# Patient Record
Sex: MALE | Race: WHITE | Employment: OTHER | URBAN - METROPOLITAN AREA
[De-identification: names, ages, dates, MRNs, and addresses within clinical notes are randomized per-mention and may not be internally consistent; named-entity substitution may affect disease eponyms.]

---

## 2017-10-27 ENCOUNTER — HOSPITAL ENCOUNTER (OUTPATIENT)
Dept: ULTRASOUND IMAGING | Age: 77
Discharge: HOME | End: 2017-10-27
Attending: UROLOGY
Payer: MEDICARE

## 2017-10-27 DIAGNOSIS — N28.1 COMPLEX RENAL CYST: ICD-10-CM

## 2017-10-27 PROCEDURE — 76770 US EXAM ABDO BACK WALL COMP: CPT

## 2017-10-27 NOTE — PROGRESS NOTES
Let pt know renal sonogram shows just slight enlargement from the prior study--needs CT--renal mass protocol and f/u in 6 months--needs a stat creatinine day of procedure--and f/u OV with any doctor

## 2018-04-13 PROBLEM — N28.1 RENAL CYST: Status: ACTIVE | Noted: 2018-04-13

## 2018-04-16 ENCOUNTER — HOSPITAL ENCOUNTER (OUTPATIENT)
Dept: CT IMAGING | Age: 78
Discharge: HOME | End: 2018-04-16
Attending: UROLOGY
Payer: MEDICARE

## 2018-04-16 DIAGNOSIS — N28.1 COMPLEX RENAL CYST: ICD-10-CM

## 2018-04-16 LAB — CREAT BLD-MCNC: 1.5 MG/DL (ref 0.8–1.5)

## 2018-04-16 PROCEDURE — 82565 ASSAY OF CREATININE: CPT

## 2018-04-16 PROCEDURE — 74011000258 HC RX REV CODE- 258: Performed by: UROLOGY

## 2018-04-16 PROCEDURE — 74170 CT ABD WO CNTRST FLWD CNTRST: CPT

## 2018-04-16 PROCEDURE — 74011636320 HC RX REV CODE- 636/320: Performed by: UROLOGY

## 2018-04-16 RX ORDER — SODIUM CHLORIDE 0.9 % (FLUSH) 0.9 %
10 SYRINGE (ML) INJECTION
Status: COMPLETED | OUTPATIENT
Start: 2018-04-16 | End: 2018-04-16

## 2018-04-16 RX ADMIN — IOPAMIDOL 100 ML: 755 INJECTION, SOLUTION INTRAVENOUS at 10:14

## 2018-04-16 RX ADMIN — SODIUM CHLORIDE 100 ML: 900 INJECTION, SOLUTION INTRAVENOUS at 10:14

## 2018-04-16 RX ADMIN — Medication 10 ML: at 10:14

## 2020-05-12 PROBLEM — G47.33 OBSTRUCTIVE SLEEP APNEA SYNDROME: Status: ACTIVE | Noted: 2020-05-12

## 2020-05-28 ENCOUNTER — HOSPITAL ENCOUNTER (OUTPATIENT)
Dept: LAB | Age: 80
Discharge: HOME | End: 2020-05-28
Payer: MEDICARE

## 2020-05-28 LAB
BNP SERPL-MCNC: 179 PG/ML
D DIMER PPP FEU-MCNC: 1.03 UG/ML(FEU)
VIT B12 SERPL-MCNC: <60 PG/ML (ref 193–986)

## 2020-05-28 PROCEDURE — 36415 COLL VENOUS BLD VENIPUNCTURE: CPT

## 2020-05-28 PROCEDURE — 82306 VITAMIN D 25 HYDROXY: CPT

## 2020-05-28 PROCEDURE — 82607 VITAMIN B-12: CPT

## 2020-05-28 PROCEDURE — 85379 FIBRIN DEGRADATION QUANT: CPT

## 2020-05-28 PROCEDURE — 83880 ASSAY OF NATRIURETIC PEPTIDE: CPT

## 2020-05-29 ENCOUNTER — HOSPITAL ENCOUNTER (OUTPATIENT)
Dept: CT IMAGING | Age: 80
Discharge: HOME | End: 2020-05-29
Attending: INTERNAL MEDICINE
Payer: MEDICARE

## 2020-05-29 DIAGNOSIS — R06.09 DYSPNEA ON EXERTION: ICD-10-CM

## 2020-05-29 DIAGNOSIS — R79.89 ELEVATED D-DIMER: ICD-10-CM

## 2020-05-29 PROCEDURE — 74011000258 HC RX REV CODE- 258: Performed by: INTERNAL MEDICINE

## 2020-05-29 PROCEDURE — 74011636320 HC RX REV CODE- 636/320: Performed by: INTERNAL MEDICINE

## 2020-05-29 PROCEDURE — 71260 CT THORAX DX C+: CPT

## 2020-05-29 RX ORDER — SODIUM CHLORIDE 0.9 % (FLUSH) 0.9 %
5-10 SYRINGE (ML) INJECTION
Status: COMPLETED | OUTPATIENT
Start: 2020-05-29 | End: 2020-05-29

## 2020-05-29 RX ADMIN — IOPAMIDOL 100 ML: 755 INJECTION, SOLUTION INTRAVENOUS at 18:05

## 2020-05-29 RX ADMIN — SODIUM CHLORIDE 100 ML: 900 INJECTION, SOLUTION INTRAVENOUS at 18:05

## 2020-05-29 RX ADMIN — Medication 10 ML: at 18:05

## 2020-05-30 LAB — 25(OH)D3+25(OH)D2 SERPL-MCNC: 16 NG/ML (ref 30–100)

## 2020-06-22 ENCOUNTER — HOSPITAL ENCOUNTER (OUTPATIENT)
Dept: SLEEP MEDICINE | Age: 80
Discharge: HOME | End: 2020-06-22
Payer: MEDICARE

## 2020-06-22 PROCEDURE — 95811 POLYSOM 6/>YRS CPAP 4/> PARM: CPT

## 2020-07-09 PROBLEM — R94.31 EKG, ABNORMAL: Status: ACTIVE | Noted: 2020-07-09

## 2020-07-09 PROBLEM — R07.2 PRECORDIAL PAIN: Status: ACTIVE | Noted: 2020-07-09

## 2020-07-31 ENCOUNTER — HOSPITAL ENCOUNTER (OUTPATIENT)
Dept: LAB | Age: 80
Discharge: HOME | End: 2020-07-31
Payer: MEDICARE

## 2020-07-31 DIAGNOSIS — R07.2 PRECORDIAL PAIN: ICD-10-CM

## 2020-07-31 PROBLEM — R94.39 ABNORMAL CARDIOVASCULAR STRESS TEST: Status: ACTIVE | Noted: 2020-07-31

## 2020-07-31 LAB
ANION GAP SERPL CALC-SCNC: 8 MMOL/L (ref 7–16)
BASOPHILS # BLD: 0.1 K/UL (ref 0–0.2)
BASOPHILS NFR BLD: 1 % (ref 0–2)
BUN SERPL-MCNC: 29 MG/DL (ref 8–23)
CALCIUM SERPL-MCNC: 9.3 MG/DL (ref 8.3–10.4)
CHLORIDE SERPL-SCNC: 106 MMOL/L (ref 98–107)
CO2 SERPL-SCNC: 25 MMOL/L (ref 21–32)
CREAT SERPL-MCNC: 1.3 MG/DL (ref 0.8–1.5)
DIFFERENTIAL METHOD BLD: NORMAL
EOSINOPHIL # BLD: 0.2 K/UL (ref 0–0.8)
EOSINOPHIL NFR BLD: 2 % (ref 0.5–7.8)
ERYTHROCYTE [DISTWIDTH] IN BLOOD BY AUTOMATED COUNT: 13.1 % (ref 11.9–14.6)
GLUCOSE SERPL-MCNC: 105 MG/DL (ref 65–100)
HCT VFR BLD AUTO: 41.6 % (ref 41.1–50.3)
HGB BLD-MCNC: 13.6 G/DL (ref 13.6–17.2)
IMM GRANULOCYTES # BLD AUTO: 0 K/UL (ref 0–0.5)
IMM GRANULOCYTES NFR BLD AUTO: 0 % (ref 0–5)
INR PPP: 1
LYMPHOCYTES # BLD: 1.6 K/UL (ref 0.5–4.6)
LYMPHOCYTES NFR BLD: 18 % (ref 13–44)
MCH RBC QN AUTO: 30.1 PG (ref 26.1–32.9)
MCHC RBC AUTO-ENTMCNC: 32.7 G/DL (ref 31.4–35)
MCV RBC AUTO: 92 FL (ref 79.6–97.8)
MONOCYTES # BLD: 0.7 K/UL (ref 0.1–1.3)
MONOCYTES NFR BLD: 8 % (ref 4–12)
NEUTS SEG # BLD: 6.4 K/UL (ref 1.7–8.2)
NEUTS SEG NFR BLD: 71 % (ref 43–78)
NRBC # BLD: 0 K/UL (ref 0–0.2)
PLATELET # BLD AUTO: 163 K/UL (ref 150–450)
PMV BLD AUTO: 11.8 FL (ref 9.4–12.3)
POTASSIUM SERPL-SCNC: 4.4 MMOL/L (ref 3.5–5.1)
PROTHROMBIN TIME: 13.6 SEC (ref 12–14.7)
RBC # BLD AUTO: 4.52 M/UL (ref 4.23–5.6)
SODIUM SERPL-SCNC: 139 MMOL/L (ref 136–145)
WBC # BLD AUTO: 8.9 K/UL (ref 4.3–11.1)

## 2020-07-31 PROCEDURE — 80048 BASIC METABOLIC PNL TOTAL CA: CPT

## 2020-07-31 PROCEDURE — 36415 COLL VENOUS BLD VENIPUNCTURE: CPT

## 2020-07-31 PROCEDURE — 85610 PROTHROMBIN TIME: CPT

## 2020-07-31 PROCEDURE — 85025 COMPLETE CBC W/AUTO DIFF WBC: CPT

## 2020-08-03 ENCOUNTER — HOSPITAL ENCOUNTER (OUTPATIENT)
Dept: CARDIAC CATH/INVASIVE PROCEDURES | Age: 80
Discharge: HOME | End: 2020-08-03
Attending: INTERNAL MEDICINE | Admitting: INTERNAL MEDICINE
Payer: MEDICARE

## 2020-08-03 VITALS
DIASTOLIC BLOOD PRESSURE: 68 MMHG | RESPIRATION RATE: 16 BRPM | BODY MASS INDEX: 25.57 KG/M2 | OXYGEN SATURATION: 96 % | HEIGHT: 76 IN | SYSTOLIC BLOOD PRESSURE: 126 MMHG | WEIGHT: 210 LBS | HEART RATE: 69 BPM

## 2020-08-03 LAB
ANION GAP SERPL CALC-SCNC: 7 MMOL/L (ref 7–16)
ATRIAL RATE: 72 BPM
BUN SERPL-MCNC: 28 MG/DL (ref 8–23)
CALCIUM SERPL-MCNC: 8.8 MG/DL (ref 8.3–10.4)
CALCULATED P AXIS, ECG09: 12 DEGREES
CALCULATED R AXIS, ECG10: -45 DEGREES
CALCULATED T AXIS, ECG11: 64 DEGREES
CHLORIDE SERPL-SCNC: 107 MMOL/L (ref 98–107)
CO2 SERPL-SCNC: 27 MMOL/L (ref 21–32)
CREAT SERPL-MCNC: 1.31 MG/DL (ref 0.8–1.5)
DIAGNOSIS, 93000: NORMAL
GLUCOSE SERPL-MCNC: 102 MG/DL (ref 65–100)
P-R INTERVAL, ECG05: 184 MS
POTASSIUM SERPL-SCNC: 4.1 MMOL/L (ref 3.5–5.1)
Q-T INTERVAL, ECG07: 402 MS
QRS DURATION, ECG06: 110 MS
QTC CALCULATION (BEZET), ECG08: 440 MS
SODIUM SERPL-SCNC: 141 MMOL/L (ref 136–145)
VENTRICULAR RATE, ECG03: 72 BPM

## 2020-08-03 PROCEDURE — 77030004559 HC CATH ANGI DX SUPT CARD -B

## 2020-08-03 PROCEDURE — 74011000250 HC RX REV CODE- 250: Performed by: INTERNAL MEDICINE

## 2020-08-03 PROCEDURE — 74011250636 HC RX REV CODE- 250/636: Performed by: INTERNAL MEDICINE

## 2020-08-03 PROCEDURE — 80048 BASIC METABOLIC PNL TOTAL CA: CPT

## 2020-08-03 PROCEDURE — C1769 GUIDE WIRE: HCPCS

## 2020-08-03 PROCEDURE — 77030029997 HC DEV COM RDL R BND TELE -B

## 2020-08-03 PROCEDURE — 77030016699 HC CATH ANGI DX INFN1 CARD -A

## 2020-08-03 PROCEDURE — 99153 MOD SED SAME PHYS/QHP EA: CPT

## 2020-08-03 PROCEDURE — 74011636320 HC RX REV CODE- 636/320: Performed by: INTERNAL MEDICINE

## 2020-08-03 PROCEDURE — 77030012468 HC VLV BLEEDBK CNTRL ABBT -B

## 2020-08-03 PROCEDURE — C1894 INTRO/SHEATH, NON-LASER: HCPCS

## 2020-08-03 PROCEDURE — 93458 L HRT ARTERY/VENTRICLE ANGIO: CPT

## 2020-08-03 PROCEDURE — 93005 ELECTROCARDIOGRAM TRACING: CPT | Performed by: INTERNAL MEDICINE

## 2020-08-03 PROCEDURE — 99152 MOD SED SAME PHYS/QHP 5/>YRS: CPT

## 2020-08-03 RX ORDER — HEPARIN SODIUM 200 [USP'U]/100ML
2 INJECTION, SOLUTION INTRAVENOUS CONTINUOUS
Status: DISCONTINUED | OUTPATIENT
Start: 2020-08-03 | End: 2020-08-03 | Stop reason: HOSPADM

## 2020-08-03 RX ORDER — SODIUM CHLORIDE 9 MG/ML
75 INJECTION, SOLUTION INTRAVENOUS CONTINUOUS
Status: DISCONTINUED | OUTPATIENT
Start: 2020-08-03 | End: 2020-08-03 | Stop reason: HOSPADM

## 2020-08-03 RX ORDER — MIDAZOLAM HYDROCHLORIDE 1 MG/ML
.5-2 INJECTION, SOLUTION INTRAMUSCULAR; INTRAVENOUS
Status: DISCONTINUED | OUTPATIENT
Start: 2020-08-03 | End: 2020-08-03 | Stop reason: HOSPADM

## 2020-08-03 RX ORDER — ACETAMINOPHEN 325 MG/1
650 TABLET ORAL
Status: CANCELLED | OUTPATIENT
Start: 2020-08-03

## 2020-08-03 RX ORDER — HYDROCODONE BITARTRATE AND ACETAMINOPHEN 5; 325 MG/1; MG/1
1 TABLET ORAL
Status: CANCELLED | OUTPATIENT
Start: 2020-08-03

## 2020-08-03 RX ORDER — FENTANYL CITRATE 50 UG/ML
25-50 INJECTION, SOLUTION INTRAMUSCULAR; INTRAVENOUS
Status: DISCONTINUED | OUTPATIENT
Start: 2020-08-03 | End: 2020-08-03 | Stop reason: HOSPADM

## 2020-08-03 RX ORDER — GUAIFENESIN 100 MG/5ML
324 LIQUID (ML) ORAL ONCE
Status: DISCONTINUED | OUTPATIENT
Start: 2020-08-03 | End: 2020-08-03 | Stop reason: HOSPADM

## 2020-08-03 RX ORDER — ONDANSETRON 2 MG/ML
4 INJECTION INTRAMUSCULAR; INTRAVENOUS
Status: CANCELLED | OUTPATIENT
Start: 2020-08-03 | End: 2020-08-04

## 2020-08-03 RX ORDER — NALOXONE HYDROCHLORIDE 0.4 MG/ML
0.4 INJECTION, SOLUTION INTRAMUSCULAR; INTRAVENOUS; SUBCUTANEOUS AS NEEDED
Status: CANCELLED | OUTPATIENT
Start: 2020-08-03

## 2020-08-03 RX ORDER — SODIUM CHLORIDE 9 MG/ML
75 INJECTION, SOLUTION INTRAVENOUS CONTINUOUS
Status: CANCELLED | OUTPATIENT
Start: 2020-08-03

## 2020-08-03 RX ORDER — SODIUM CHLORIDE 0.9 % (FLUSH) 0.9 %
5-40 SYRINGE (ML) INJECTION AS NEEDED
Status: CANCELLED | OUTPATIENT
Start: 2020-08-03

## 2020-08-03 RX ORDER — LIDOCAINE HYDROCHLORIDE 10 MG/ML
1-5 INJECTION, SOLUTION EPIDURAL; INFILTRATION; INTRACAUDAL; PERINEURAL ONCE
Status: COMPLETED | OUTPATIENT
Start: 2020-08-03 | End: 2020-08-03

## 2020-08-03 RX ORDER — MORPHINE SULFATE 2 MG/ML
1 INJECTION, SOLUTION INTRAMUSCULAR; INTRAVENOUS
Status: CANCELLED | OUTPATIENT
Start: 2020-08-03

## 2020-08-03 RX ORDER — SODIUM CHLORIDE 0.9 % (FLUSH) 0.9 %
5 SYRINGE (ML) INJECTION AS NEEDED
Status: DISCONTINUED | OUTPATIENT
Start: 2020-08-03 | End: 2020-08-03 | Stop reason: HOSPADM

## 2020-08-03 RX ORDER — SODIUM CHLORIDE 0.9 % (FLUSH) 0.9 %
5-40 SYRINGE (ML) INJECTION EVERY 8 HOURS
Status: CANCELLED | OUTPATIENT
Start: 2020-08-03

## 2020-08-03 RX ADMIN — SODIUM CHLORIDE 75 ML/HR: 900 INJECTION, SOLUTION INTRAVENOUS at 08:17

## 2020-08-03 RX ADMIN — HEPARIN SODIUM 2 UNITS/HR: 5000 INJECTION, SOLUTION INTRAVENOUS; SUBCUTANEOUS at 09:48

## 2020-08-03 RX ADMIN — VERAPAMIL HYDROCHLORIDE 2 ML: 2.5 INJECTION, SOLUTION INTRAVENOUS at 09:55

## 2020-08-03 RX ADMIN — FENTANYL CITRATE 25 MCG: 50 INJECTION INTRAMUSCULAR; INTRAVENOUS at 09:48

## 2020-08-03 RX ADMIN — IOPAMIDOL 70 ML: 755 INJECTION, SOLUTION INTRAVENOUS at 10:24

## 2020-08-03 RX ADMIN — MIDAZOLAM 2 MG: 1 INJECTION INTRAMUSCULAR; INTRAVENOUS at 09:49

## 2020-08-03 RX ADMIN — LIDOCAINE HYDROCHLORIDE 3 ML: 10 INJECTION, SOLUTION EPIDURAL; INFILTRATION; INTRACAUDAL; PERINEURAL at 09:57

## 2020-08-03 NOTE — PROCEDURES
Brief Cardiac Procedure Note    Patient: Michael Huggins MRN: [id number]  SSN: [id number]    YOB: 1940  Age: [de-identified] y.o. Sex: male      Date of Procedure: 8/3/2020     Pre-procedure Diagnosis: Typical Angina    Post-procedure Diagnosis: Coronary Artery Disease    Reason for Procedure: New Onset Angina < or = 2 Months    Procedure: Left Heart Catheterization    Brief Description of Procedure:  LHC via R radial artery, selective coronary angiography. Performed By: Lani Childers MD     Assistants: None    Anesthesia: Moderate Sedation    Estimated Blood Loss: Less than 10 mL      Specimens: None    Implants: None    Findings: LM 60% distal, LAD luminal irregs, Circ OM1 40% luminal irregs, RCA prox 30% co-dominant    Complications: None    Recommendations: Discuss CABG vs high risk PCI.     Signed By: Lani Childers MD     August 3, 2020

## 2020-08-03 NOTE — PROCEDURES
[facility]  CARDIAC CATH    Name:  Krishna Vail  MR#:  [id number]  :  1940  ACCOUNT #:  [de-identified]  DATE OF SERVICE:  2020    PROCEDURES PERFORMED:  Left heart catheterization, selective coronary angiography via the right radial artery. PREOPERATIVE DIAGNOSIS:  Angina. POSTOPERATIVE DIAGNOSIS:  Coronary artery disease. SURGEON:  Nitza Beebe MD    ASSISTANT:  None    ESTIMATED BLOOD LOSS:  Less than 10 mL. SPECIMENS REMOVED:  None. COMPLICATIONS:  None. IMPLANTS:  None. ANESTHESIA:  The patient received 2 mg Versed and 25 mcg fentanyl, was monitored for conscious sedation beginning at 9:50 and ending at 10:26 by nurse, Geraldine Zamudio. PROCEDURE:  After informed consent, the patient was prepped and draped in usual sterile fashion. The right wrist was infiltrated with lidocaine. The right radial artery was accessed by the modified Seldinger technique with a 6-Thai sheath. A total of 70 mL of Isovue contrast was used for the entire procedure. A Terumo band was used for hemostasis. CATHETERS USED:  Included a 6-Thai JL3.5, a 6-Thai JR5. FINDINGS:  Left ventricle:  Left ventriculogram was deferred as the patient recently underwent echocardiogram with a normal ejection fraction. LVEDP was 5 mmHg. Left main had distal 60% stenosis, bifurcating into an LAD and into a large circumflex artery. The LAD had very mild luminal irregularities elsewhere. The circumflex artery gave rise to two obtuse marginals, the second one of which had approximately 40% proximal stenosis. Obtuse marginal 1 also had a 50% proximal stenosis. The right coronary artery was a codominant artery and had a proximal 30% stenosis. Mild luminal irregularities throughout otherwise. The left subclavian vein was incredibly tortuous. It was difficult to get our catheters into the ascending aorta. It required significant catheter and wire manipulation. Recommend further heart cath or intervention being done via femoral approach. CONCLUSION:  We will plan on referring for coronary artery bypass grafting. If his family were to elect to pursue percutaneous intervention, I would recommend a femoral approach.       Tania Denton MD      [id number]  D:  08/03/2020 10:37  T:  08/03/2020 14:41  JOB #:  [id number]

## 2020-08-03 NOTE — PROGRESS NOTES
Discharge instructions given to patient and daughter along with CT surgery/pre assessment instructions. Both voice understanding. Patient is alert and oriented. VSS. Patient transferred via wheelchair for discharge.

## 2020-08-03 NOTE — PROGRESS NOTES
TRANSFER - OUT REPORT:    R radial [facility] with Dr Dulce Sullivan  Versed 2 mg  Fentanyl 25 mcg  Surgery consult   TR band 12 ml  No bleeding or hematoma noted at site. Site soft    Verbal report given to Tamanna(name) on Murleen Din  being transferred to CPRU(unit) for routine progression of care       Report consisted of patients Situation, Background, Assessment and   Recommendations(SBAR). Information from the following report(s) Procedure Summary was reviewed with the receiving nurse. Lines:   Peripheral IV 08/02/20 Right Antecubital (Active)       Peripheral IV 08/02/20 Left;Posterior Forearm (Active)        Opportunity for questions and clarification was provided.       Patient transported with:   Registered Nurse

## 2020-08-03 NOTE — PROGRESS NOTES
Patient requesting to get dressed. Bilateral IV removed, tips intact. Continues to refuse any food at this time. Patient awaiting CT surgeon.

## 2020-08-03 NOTE — CONSULTS
Lyn Kim. MD Manju Mullen. MD Leida Casey         8/3/2020        1940    REFERRING PHYSICIAN:  Dr. Tianna Casiano:  The patient is a [de-identified] y.o. male who was seen in the office by Dr. Hari Argueta in follow up. He has noted some chest pain, right arm pain and progressive fatigue. Nuclear stress test showed areas of ischemia towards the inferior septal all the way to the apical region and some lateral ischemia. LHC was planned. He underwent cardiac catheterization today that showed obstructive LM disease. Recent echo showed normal LV EF with mild MR. He states he woke up with chest discomfort on one occasion. He does not correlate chest pain with exertion but states he has not been able to exert himself very much for some time. He feels very fatigued after walking to the mailbox and back which is approximately 200 feet. He has new tremor and is seeing neurology later this month. He states he was told to use a cane but usually doesn't. His daughter states he has suffered a few falls over the last year. ** No history of stroke, TIA, prior cardiac surgery, prior vascular surgery, anesthetic complication, lung disease, GI bleeding. 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        Past Medical History:   Diagnosis Date    Buerger's disease ([facility] [address]     Hematuria, microscopic     Personal history of prostate cancer        Past Surgical History:   Procedure Laterality Date    APPENDECTOMY      HX CYST REMOVAL Left     shoulder    HX OTHER SURGICAL      prostate radiation       Family History   Problem Relation Age of Onset    Cancer Father     Stroke Father     Heart Disease Father     Stroke Mother     Heart Disease Mother     Pulmonary Embolism Brother        Social History     Socioeconomic History    Marital status:      Spouse name: Not on file    Number of children: Not on file    Years of education: Not on file   Whitney Simmons Highest education level: Not on file   Occupational History    Not on file   Social Needs    Financial resource strain: Not on file    Food insecurity     Worry: Not on file     Inability: Not on file    Transportation needs     Medical: Not on file     Non-medical: Not on file   Tobacco Use    Smoking status: Former Smoker    Smokeless tobacco: Never Used   Substance and Sexual Activity    Alcohol use: No    Drug use: Not on file    Sexual activity: Yes   Lifestyle    Physical activity     Days per week: Not on file     Minutes per session: Not on file    Stress: Not on file   Relationships    Social connections     Talks on phone: Not on file     Gets together: Not on file     Attends Yazdanism service: Not on file     Active member of club or organization: Not on file     Attends meetings of clubs or organizations: Not on file     Relationship status: Not on file    Intimate partner violence     Fear of current or ex partner: Not on file     Emotionally abused: Not on file     Physically abused: Not on file     Forced sexual activity: Not on file   Other Topics Concern    Not on file   Social History Narrative    Drinks 1 cup of caffeine per day       Allergies   Allergen Reactions    Tamsulosin Vertigo       No current facility-administered medications on file prior to encounter. Current Outpatient Medications on File Prior to Encounter   Medication Sig Dispense Refill    solifenacin (VESICARE) 5 mg tablet Take 1 Tab by mouth daily. 90 Tab 3    cyanocobalamin (VITAMIN B12) 1,000 mcg/mL injection Use once weekly for 4 weeks IM, then monthly 10 Vial 5    cholecalciferol (Vitamin D3) 25 mcg (1,000 unit) cap Take  by mouth daily.  Syringe with Needle, Disp, (BD Eclipse Luer-Lonnie) 3 mL 23 x 1\" syrg Use one weekly for 1st week, then monthly 16 Syringe 5    betamethasone dipropionate (DIPROLENE) 0.05 % ointment Apply  to affected area two (2) times a day. Pull back foreskin with each application.  39 g 1       REVIEW OF SYSTEMS:  Review of Systems   Constitution: Positive for malaise/fatigue. Negative for chills, fever, weight gain and weight loss. HENT: Negative for ear pain, hearing loss, nosebleeds, sore throat and tinnitus. Eyes: Negative for blurred vision, vision loss in left eye and vision loss in right eye. Cardiovascular: Positive for chest pain and dyspnea on exertion. Negative for leg swelling, near-syncope, orthopnea, palpitations, paroxysmal nocturnal dyspnea and syncope. Respiratory: Positive for shortness of breath. Negative for cough, hemoptysis, sputum production and wheezing. Endocrine: Negative for cold intolerance, heat intolerance and polydipsia. Hematologic/Lymphatic: Does not bruise/bleed easily. Skin: Negative for color change and rash. Musculoskeletal: Negative for back pain, joint pain, joint swelling and myalgias. Gastrointestinal: Negative for abdominal pain, constipation, diarrhea, dysphagia, heartburn, hematemesis, melena, nausea and vomiting. Genitourinary: Negative for dysuria, frequency, hematuria and urgency. Neurological: Positive for tremors. Negative for difficulty with concentration, dizziness, headaches, light-headedness, numbness, paresthesias, seizures, vertigo and weakness. Psychiatric/Behavioral: Negative for altered mental status and depression. Physical Exam  Vitals:    08/03/20 1035 08/03/20 1045 08/03/20 1100 08/03/20 1115   BP: 118/62 118/62 108/55 102/56   Pulse: 67 64 65 64   Resp:       SpO2: 92% 95% 93% 96%   Weight:       Height:           Physical Exam:  General: Well Developed, Well Nourished, No Acute Distress  HEENT: Normocephalic, pupils equal and round, no scleral icterus  Neck: supple, no JVD  Chest wall: No deformity  Heart: S1S2 with RRR without murmurs or gallops  Lungs: Clear throughout auscultation bilaterally without adventitious sounds  Vascular: Pulses are full and equal. There is no venous stasis disease.   Abd: soft, nontender, nondistended, with good bowel sounds, no pulsatile masses  Ext: warm, no edema, calves supple/nontender, pulses 2+ bilaterally  Skin: warm and dry, no rashes, cyanosis, jaundice, ecchymoses or evidence of skin breakdown  Psychiatric: Normal mood and affect  Neurologic: Alert and oriented X 3, no focal deficit noted    Labs:   Recent Labs     08/03/20  0804 07/31/20  1330    139   K 4.1 4.4   BUN 28* 29*   CREA 1.31 1.30   * 105*   WBC  --  8.9   HGB  --  13.6   HCT  --  41.6   PLT  --  163   INR  --  1.0       Assessment:     Active Problems:    * CAD  PIETER  History of prostate cancer  Tremor        Plan:     Saima Zaira is to see preoperative teaching film that thoroughly discusses procedure, risks, and possible complications. Risks, benefits, and alternatives were discussed to include, but not limited to:     1. Bleeding  2. Arrhythmia   3. Infection including mediastinitis  4. Myocardial infarction  5. Need for reoperation  6. Renal failure  7. Respiratory failure  8. Stroke  9. Potential death    Dr. Dorothy Osorio will personally view the cardiac catheterization films, echocardiogram, and labs. CABG surgery vs PCI discussed at length with patient and daughter. STS Mortality risk for CABG surgery is 1.151%. He is mildly frail on CFS scale. They will discuss further with Dr Dorothy Osorio. If CABG surgery is pursued, he would need STR after surgery and likely have prolonged recovery.        Aviva Hinkle PA-C

## 2020-08-03 NOTE — PROGRESS NOTES
TRANSFER - IN REPORT:    Verbal report received from ROMINA, [facility] on Girish Obando  being received from  for routine progression of care      Report consisted of patients Situation, Background, Assessment and   Recommendations(SBAR). Information from the following report(s) SBAR, Procedure Summary, MAR and Recent Results was reviewed with the receiving nurse. Opportunity for questions and clarification was provided. Assessment completed upon patients arrival to unit and care assumed.

## 2020-08-03 NOTE — DISCHARGE INSTRUCTIONS
HEART CATHETERIZATION/ANGIOGRAPHY DISCHARGE INSTRUCTIONS    1. Check puncture site frequently for swelling or bleeding. If there is any bleeding, lie down and apply pressure over the area with a clean towel or washcloth, call [phone]. Notify your doctor for any redness, swelling, drainage, or oozing from the puncture site. Notify your doctor for any fever or chills. 2. If the extremity becomes cold, numb, or painful call [facility] at [phone].  3. Activity should be limited for the next 48 hours. Climb stairs as little as possible and avoid any stooping, bending, or strenuous activity for 48 hours. No heavy lifting (anything over 10 pounds) for 3 days. 4. You may resume your usual diet. Drink more fluids than usual.  5. Have a responsible person drive you home and stay with you for at least 24 hours after your heart catheterization/angiography. No driving for 24 hours. 6. You may remove bandage from your Right Arm in 24 hours. You may shower in 24 hours. No tub baths, hot tubs, or swimming for 1 week. Do not place any lotions, creams, powders, or ointments over puncture site for 1 week. You may place a clean band-aid over the puncture site each day for 5 days. Change daily. I have read the above instructions and have had the opportunity to ask questions.       Patient: ________________________   Date: 8/3/2020    Witness: _______________________   Date: 8/3/2020

## 2020-08-07 ENCOUNTER — HOSPITAL ENCOUNTER (OUTPATIENT)
Dept: GENERAL RADIOLOGY | Age: 80
Discharge: HOME | DRG: 236 | End: 2020-08-07
Attending: PHYSICIAN ASSISTANT
Payer: MEDICARE

## 2020-08-07 ENCOUNTER — HOSPITAL ENCOUNTER (OUTPATIENT)
Dept: ULTRASOUND IMAGING | Age: 80
Discharge: HOME | DRG: 236 | End: 2020-08-07
Attending: PHYSICIAN ASSISTANT
Payer: MEDICARE

## 2020-08-07 ENCOUNTER — ANESTHESIA EVENT (OUTPATIENT)
Dept: SURGERY | Age: 80
DRG: 236 | End: 2020-08-07
Payer: MEDICARE

## 2020-08-07 ENCOUNTER — HOSPITAL ENCOUNTER (OUTPATIENT)
Dept: SURGERY | Age: 80
Discharge: HOME | DRG: 236 | End: 2020-08-07
Payer: MEDICARE

## 2020-08-07 VITALS
WEIGHT: 205.5 LBS | BODY MASS INDEX: 25.02 KG/M2 | DIASTOLIC BLOOD PRESSURE: 60 MMHG | SYSTOLIC BLOOD PRESSURE: 129 MMHG | TEMPERATURE: 97.1 F | OXYGEN SATURATION: 97 % | HEART RATE: 87 BPM | HEIGHT: 76 IN | RESPIRATION RATE: 16 BRPM

## 2020-08-07 LAB
ALBUMIN SERPL-MCNC: 3.3 G/DL (ref 3.2–4.6)
ALBUMIN/GLOB SERPL: 0.8 {RATIO} (ref 1.2–3.5)
ALP SERPL-CCNC: 66 U/L (ref 50–136)
ALT SERPL-CCNC: 11 U/L (ref 12–65)
ANION GAP SERPL CALC-SCNC: 5 MMOL/L (ref 7–16)
APPEARANCE UR: CLEAR
AST SERPL-CCNC: 16 U/L (ref 15–37)
BACTERIA SPEC CULT: NORMAL
BACTERIA URNS QL MICRO: 0 /HPF
BILIRUB SERPL-MCNC: 0.4 MG/DL (ref 0.2–1.1)
BILIRUB UR QL: NEGATIVE
BUN SERPL-MCNC: 29 MG/DL (ref 8–23)
CALCIUM SERPL-MCNC: 9.1 MG/DL (ref 8.3–10.4)
CASTS URNS QL MICRO: ABNORMAL /LPF
CHLORIDE SERPL-SCNC: 108 MMOL/L (ref 98–107)
CO2 SERPL-SCNC: 29 MMOL/L (ref 21–32)
COLOR UR: YELLOW
CREAT SERPL-MCNC: 1.41 MG/DL (ref 0.8–1.5)
EPI CELLS #/AREA URNS HPF: ABNORMAL /HPF
EST. AVERAGE GLUCOSE BLD GHB EST-MCNC: 126 MG/DL
GLOBULIN SER CALC-MCNC: 4.2 G/DL (ref 2.3–3.5)
GLUCOSE SERPL-MCNC: 113 MG/DL (ref 65–100)
GLUCOSE UR STRIP.AUTO-MCNC: NEGATIVE MG/DL
HBA1C MFR BLD: 6 % (ref 4.8–6)
HGB UR QL STRIP: NEGATIVE
KETONES UR QL STRIP.AUTO: NEGATIVE MG/DL
LEUKOCYTE ESTERASE UR QL STRIP.AUTO: ABNORMAL
MAGNESIUM SERPL-MCNC: 2.2 MG/DL (ref 1.8–2.4)
NITRITE UR QL STRIP.AUTO: NEGATIVE
PH UR STRIP: 5.5 [PH] (ref 5–9)
POTASSIUM SERPL-SCNC: 4.9 MMOL/L (ref 3.5–5.1)
PROT SERPL-MCNC: 7.5 G/DL (ref 6.3–8.2)
PROT UR STRIP-MCNC: NEGATIVE MG/DL
RBC #/AREA URNS HPF: ABNORMAL /HPF
SERVICE CMNT-IMP: NORMAL
SODIUM SERPL-SCNC: 142 MMOL/L (ref 136–145)
SP GR UR REFRACTOMETRY: 1.02 (ref 1–1.02)
UROBILINOGEN UR QL STRIP.AUTO: 0.2 EU/DL (ref 0.2–1)
WBC URNS QL MICRO: ABNORMAL /HPF

## 2020-08-07 PROCEDURE — 81001 URINALYSIS AUTO W/SCOPE: CPT

## 2020-08-07 PROCEDURE — 83036 HEMOGLOBIN GLYCOSYLATED A1C: CPT

## 2020-08-07 PROCEDURE — 94010 BREATHING CAPACITY TEST: CPT

## 2020-08-07 PROCEDURE — 71046 X-RAY EXAM CHEST 2 VIEWS: CPT

## 2020-08-07 PROCEDURE — 93880 EXTRACRANIAL BILAT STUDY: CPT

## 2020-08-07 PROCEDURE — 77030027138 HC INCENT SPIROMETER -A

## 2020-08-07 PROCEDURE — 80053 COMPREHEN METABOLIC PANEL: CPT

## 2020-08-07 PROCEDURE — 83735 ASSAY OF MAGNESIUM: CPT

## 2020-08-07 PROCEDURE — 87641 MR-STAPH DNA AMP PROBE: CPT

## 2020-08-07 RX ORDER — GUAIFENESIN 100 MG/5ML
81 LIQUID (ML) ORAL DAILY
COMMUNITY
End: 2020-10-14

## 2020-08-07 RX ORDER — METOPROLOL TARTRATE 25 MG/1
12.5 TABLET, FILM COATED ORAL ONCE
COMMUNITY
Start: 2020-08-09 | End: 2020-08-18

## 2020-08-07 RX ORDER — AMIODARONE HYDROCHLORIDE 200 MG/1
600 TABLET ORAL ONCE
COMMUNITY
Start: 2020-08-09 | End: 2020-08-18

## 2020-08-07 NOTE — PERIOP NOTES
Patient confirms name and . Order to obtain consent  found in EHR and  matches case posting. Patient verbalizes understanding of 1 visitor policy and current visiting hour restrictions. Labs/Orders per Surgeon:completed  POC Glucose:not indicated    Dr. Jasbir Toribio in to assess patient per anesthesia protocol. All cardiac records reviewed. EKG completed 8/3/20, cath 8/3/20, ECHO 20 EF 62.1%. No orders given  Per Brandon Hatchet, PA patient to begin daily 81 mg aspirin. Rx bottles visualized today. Patient viewed preoperative heart teaching video. Pre op instructions and education sheets given and reviewed with patient:  Heart instructions,  blood transfusion education, hand hygiene education, pain management education. Patient verbalizes understanding of all instructions. Patient given incentive spirometer with verbal instructions. Patient seen by respiratory therapist, and  FEV1 results on chart. Pt instructed on importance of handwashing for infection prevention. Pt verbalizes understanding to shower nightly with antibacterial soap & Hibiclens provided at pre assessment on the night prior to and morning of surgery. Instructed to turn water off and wash with HIBICLENS from chin to toes avoiding genitalia, then rinse after 1 minute. Pt verbalizes understanding to repeat this the morning of surgery. Medication instructions provided per medication handout note in EHR. Patient and his daughter verbalize understanding of these instructions. Pt verbalizes understanding to CONTINUE ALL OTHER MEDICATIONS AS PRESCRIBED UNTIL DAY OF SURGERY unless instructed differently per instruction sheet below (provided copy to patient). Prescriptions called to patient's pharmacy: [phone] [facility]   amiodarone 600 mg and metoprolol 12.5 mg to be taken after 4pm evening before surgery. Patient given written and verbal instructions to obtain and instructions for use.   Pt verbalizes understanding to take Amiodarone 600 mg after 4 pm the night before heart surgery. Lopressor 12.5 mg take by mouth after 4 pm the night before heart surgery. One time dose with no refills. MRSA swab and clean catch urine obtained and sent to lab. Patient ID armband placed on patient's arm, and patient given copy of order for CXR and carotid ultrasound. Patient verbalizes understanding to proceed to radiology after labs are drawn to have both CXR and carotid ultrasound completed before leaving the hospital today. Patient instructed to return on morning prior to surgery to ER to register to have blood bank labs, and has copy of order. Order faxed to lab (confirmation received) and Adelia in lab notified patient will be coming in Sunday to have drawn. ER notified patient will arrive to register to have labs drawn. Patient and his daughter informed green blood bank wrist band will be placed on the patient's right arm and pt verbalizes understanding not to remove the band until discharged from the hospital after surgery. Labs drawn by [facility]      Patient verbalizes understanding that arrival time will be called to patient on the weekday prior to surgery date and that patient must check phone messages. If patient has any questions regarding arrival times call [phone]. PT. INSTRUCTED TO CALL THE FOLLOWING NUMBERS IF ANY SAFETY CONCERNS BEFORE, DURING, OR AFTER HOSPITAL ENCOUNTER: PT. SAFETY NKAE - [phone] OR PT. RELATIONS - [phone].

## 2020-08-07 NOTE — PERIOP NOTES
PLEASE CONTINUE TAKING ALL PRESCRIPTION MEDICATIONS UP TO THE DAY OF SURGERY UNLESS OTHERWISE DIRECTED BELOW. DISCONTINUE all vitamins and supplements now. DISCONTINUE Non-Steriodal Anti-Inflammatory (NSAIDS) such as Advil and Aleve 5 days prior to surgery. Home Medications to take  the day of surgery   Solifenacin succinate (Vesicare)  Aspirin 81 mg               Home Medications   to Hold   Vitamins and supplements beginning now        Comments    prescriptions called to pharmacy. Take (3) 200 mg tabs of amiodarone for a total dose of 600 mg after 4 pm evening before procedure. Take 1/2 tablet of metoprolol for a total dose of 12.5 mg after 4 pm evening before surgery. **Return to hospital to have blood bank labs drawn on Sunday. Bring orders with you. Check in at the ER and tell them you are having open heart surgery on Monday and are here to have blood bank labs drawn. Do not remove the green bracelet placed on your arm at this time. This will remain on you during your hospital stay after surgery. * Bring BiPAP to hospital DOS. *Visitor policy of 1 visitor per patient discussed. Please do not bring home medications with you on the day of surgery unless otherwise directed by your nurse. If you are instructed to bring home medications, please give them to your nurse as they will be administered by the nursing staff. If you have any questions, please call [facility] [phone]. A copy of this note was provided to the patient for reference. How to Use Your Incentive Spirometer       About Your Incentive Spirometer  An incentive spirometer is a device that will expand your lungs by helping you to breathe more deeply and fully. The parts of your incentive spirometer are labeled in Figure 1. Using your incentive spirometer  When youre using your incentive spirometer, make sure to breathe through your mouth.  If you breathe through your nose, the incentive spirometer wont work properly. You can hold your nose if you have trouble. DO NOT BLOW INTO THE DEVICE. If you feel dizzy at any time, stop and rest. Try again at a later time. 1. Sit upright in a chair or in bed. Hold the incentive spirometer at eye level. 2. Put the mouthpiece in your mouth and close your lips tightly around it. Slowly breathe out (exhale) completely. 3. Breathe in (inhale) slowly through your mouth as deeply as you can. As you take the breath, you will see the piston rise inside the large column. While the piston rises, the indicator on the right should move upwards. It should stay in between the 2 arrows (see Figure 1). 4. Try to get the piston as high as you can, while keeping the indicator between the arrows. If the indicator doesnt stay between the arrows, youre breathing either too fast or too slow. 5. When you get it as high as you can, hold your breath for 10 seconds, or as long as possible. While youre holding your breath, the piston will slowly fall to the base of the spirometer. 6. Once the piston reaches the bottom of the spirometer, breathe out slowly through your mouth. Rest for a few seconds. 7. Repeat 10 times. Try to get the piston to the same level with each breath. 8. After each set of 10 breaths, try to cough as coughing will help loosen or clear any mucus in your lungs. 9. Put the marker at the level the piston reached on your incentive spirometer. This will be your goal next time. Repeat these steps every hour that youre awake.   Cover the mouthpiece of the incentive spirometer when you arent using it

## 2020-08-07 NOTE — PERIOP NOTES
Recent Results (from the past 12 hour(s))   URINALYSIS W/ RFLX MICROSCOPIC    Collection Time: 08/07/20  8:57 AM   Result Value Ref Range    Color YELLOW      Appearance CLEAR      Specific gravity 1.017 1.001 - 1.023      pH (UA) 5.5 5.0 - 9.0      Protein Negative NEG mg/dL    Glucose Negative mg/dL    Ketone Negative NEG mg/dL    Bilirubin Negative NEG      Blood Negative NEG      Urobilinogen 0.2 0.2 - 1.0 EU/dL    Nitrites Negative NEG      Leukocyte Esterase TRACE (A) NEG      WBC 0-3 0 /hpf    RBC 0-3 0 /hpf    Epithelial cells 0-3 0 /hpf    Bacteria 0 0 /hpf    Casts 0-3 0 /lpf   HEMOGLOBIN A1C WITH EAG    Collection Time: 08/07/20  9:02 AM   Result Value Ref Range    Hemoglobin A1c 6.0 4.8 - 6.0 %    Est. average glucose 424 mg/dL   METABOLIC PANEL, COMPREHENSIVE    Collection Time: 08/07/20  9:02 AM   Result Value Ref Range    Sodium 142 136 - 145 mmol/L    Potassium 4.9 3.5 - 5.1 mmol/L    Chloride 108 (H) 98 - 107 mmol/L    CO2 29 21 - 32 mmol/L    Anion gap 5 (L) 7 - 16 mmol/L    Glucose 113 (H) 65 - 100 mg/dL    BUN 29 (H) 8 - 23 MG/DL    Creatinine 1.41 0.8 - 1.5 MG/DL    GFR est AA >60 >60 ml/min/1.73m2    GFR est non-AA 51 (L) >60 ml/min/1.73m2    Calcium 9.1 8.3 - 10.4 MG/DL    Bilirubin, total 0.4 0.2 - 1.1 MG/DL    ALT (SGPT) 11 (L) 12 - 65 U/L    AST (SGOT) 16 15 - 37 U/L    Alk. phosphatase 66 50 - 136 U/L    Protein, total 7.5 6.3 - 8.2 g/dL    Albumin 3.3 3.2 - 4.6 g/dL    Globulin 4.2 (H) 2.3 - 3.5 g/dL    A-G Ratio 0.8 (L) 1.2 - 3.5     MAGNESIUM    Collection Time: 08/07/20  9:02 AM   Result Value Ref Range    Magnesium 2.2 1.8 - 2.4 mg/dL   MSSA/MRSA SC BY PCR, NASAL SWAB    Collection Time: 08/07/20  9:02 AM    Specimen: Nasal   Result Value Ref Range    Special Requests: NO SPECIAL REQUESTS      Culture result:        SA target not detected. A MRSA NEGATIVE, SA NEGATIVE test result does not preclude MRSA or SA nasal colonization. Results reviewed. Reported chest xray, UA, creatinine, GFR, and carotid US [facility]. No further action needed Forwarded to surgeon.

## 2020-08-07 NOTE — PROCEDURES
[facility]  PROCEDURE NOTE    Name:  Jesus Manuel Contreras  MR#:  [id number]  :  1940  ACCOUNT #:  [de-identified]  DATE OF SERVICE:  2020    SPIROMETRY    INTERPRETATION:  The flow volume loop is normal.    Spirometry suggests restriction.     MD ANIA Collazo/VIKTORIA_T/[email or web address]_P  D:  2020 12:09  T:  2020 13:43  JOB #:  [id number]

## 2020-08-07 NOTE — ANESTHESIA PREPROCEDURE EVALUATION
Relevant Problems   No relevant active problems       Anesthetic History   No history of anesthetic complications            Review of Systems / Medical History  Pertinent labs reviewed    Pulmonary        Sleep apnea: BiPAP           Neuro/Psych   Within defined limits           Cardiovascular              CAD (60% LM disease, preserved EF)    Exercise tolerance: <4 METS     GI/Hepatic/Renal           Hiatal hernia     Endo/Other        Arthritis and cancer (prostate)     Other Findings   Comments: Buerger's disease- nonatherosclerotic, segmental, inflammatory disease that most commonly affects the small to medium-sized arteries and veins of the extremities. Resolved after quitting smoking.          Physical Exam    Airway  Mallampati: II  TM Distance: 4 - 6 cm  Neck ROM: normal range of motion   Mouth opening: Normal     Cardiovascular  Regular rate and rhythm,  S1 and S2 normal,  no murmur, click, rub, or gallop             Dental    Dentition: Full upper dentures     Pulmonary  Breath sounds clear to auscultation               Abdominal  GI exam deferred       Other Findings            Anesthetic Plan    ASA: 4  Anesthesia type: general    Monitoring Plan: Arterial line, BIS, CVP, Bradley-Neena and VITALIY    Post procedure ventilation   Induction: Intravenous  Anesthetic plan and risks discussed with: Patient and Son / Daughter

## 2020-08-09 ENCOUNTER — HOSPITAL ENCOUNTER (OUTPATIENT)
Dept: LAB | Age: 80
Discharge: HOME | DRG: 236 | End: 2020-08-09
Payer: MEDICARE

## 2020-08-09 PROCEDURE — 86900 BLOOD TYPING SEROLOGIC ABO: CPT

## 2020-08-09 PROCEDURE — 86923 COMPATIBILITY TEST ELECTRIC: CPT

## 2020-08-09 PROCEDURE — 36415 COLL VENOUS BLD VENIPUNCTURE: CPT

## 2020-08-10 ENCOUNTER — HOSPITAL ENCOUNTER (INPATIENT)
Age: 80
LOS: 8 days | Discharge: HOME HEALTH | DRG: 236 | End: 2020-08-18
Attending: THORACIC SURGERY (CARDIOTHORACIC VASCULAR SURGERY) | Admitting: THORACIC SURGERY (CARDIOTHORACIC VASCULAR SURGERY)
Payer: MEDICARE

## 2020-08-10 ENCOUNTER — ANESTHESIA (OUTPATIENT)
Dept: SURGERY | Age: 80
DRG: 236 | End: 2020-08-10
Payer: MEDICARE

## 2020-08-10 ENCOUNTER — APPOINTMENT (OUTPATIENT)
Dept: GENERAL RADIOLOGY | Age: 80
DRG: 236 | End: 2020-08-10
Attending: THORACIC SURGERY (CARDIOTHORACIC VASCULAR SURGERY)
Payer: MEDICARE

## 2020-08-10 DIAGNOSIS — G47.31 CENTRAL SLEEP APNEA: ICD-10-CM

## 2020-08-10 DIAGNOSIS — Z99.11 ENCOUNTER FOR WEANING FROM VENTILATOR (HCC): ICD-10-CM

## 2020-08-10 DIAGNOSIS — G47.33 OBSTRUCTIVE SLEEP APNEA SYNDROME: ICD-10-CM

## 2020-08-10 DIAGNOSIS — Z95.1 S/P CABG X 2: ICD-10-CM

## 2020-08-10 DIAGNOSIS — R09.02 HYPOXEMIA: ICD-10-CM

## 2020-08-10 PROBLEM — I25.10 CAD (CORONARY ARTERY DISEASE): Status: ACTIVE | Noted: 2020-08-10

## 2020-08-10 PROBLEM — I25.110 ATHEROSCLEROSIS OF NATIVE CORONARY ARTERY OF NATIVE HEART WITH UNSTABLE ANGINA PECTORIS (HCC): Status: ACTIVE | Noted: 2020-08-10

## 2020-08-10 LAB
ANION GAP SERPL CALC-SCNC: 11 MMOL/L (ref 7–16)
APTT PPP: 31 SEC (ref 24.3–35.4)
ARTERIAL PATENCY WRIST A: ABNORMAL
ARTERIAL PATENCY WRIST A: ABNORMAL
ATRIAL RATE: 74 BPM
BASE DEFICIT BLD-SCNC: 1 MMOL/L
BASE DEFICIT BLD-SCNC: 1 MMOL/L
BASE DEFICIT BLD-SCNC: 5 MMOL/L
BASE DEFICIT BLD-SCNC: 7 MMOL/L
BASE EXCESS BLD CALC-SCNC: 0 MMOL/L
BASE EXCESS BLD CALC-SCNC: 1 MMOL/L
BDY SITE: ABNORMAL
BDY SITE: ABNORMAL
BUN SERPL-MCNC: 23 MG/DL (ref 8–23)
CA-I BLD-MCNC: 1.13 MMOL/L (ref 1.12–1.32)
CA-I BLD-MCNC: 1.2 MMOL/L (ref 1.12–1.32)
CA-I BLD-MCNC: 1.24 MMOL/L (ref 1.12–1.32)
CA-I BLD-MCNC: 1.31 MMOL/L (ref 1.12–1.32)
CA-I BLD-MCNC: 1.43 MMOL/L (ref 1.12–1.32)
CALCIUM SERPL-MCNC: 8.4 MG/DL (ref 8.3–10.4)
CALCULATED P AXIS, ECG09: 75 DEGREES
CALCULATED R AXIS, ECG10: -69 DEGREES
CALCULATED T AXIS, ECG11: 78 DEGREES
CHLORIDE SERPL-SCNC: 113 MMOL/L (ref 98–107)
CITRATED FUNCTIONAL FIBRINOGEN MAXIMUM AMPLITUDE: 17.9 MM (ref 15–32)
CITRATED FUNCTIONAL FIBRINOGEN MAXIMUM AMPLITUDE: 18.8 MM (ref 15–32)
CITRATED FUNCTIONAL FIBRINOGEN MAXIMUM AMPLITUDE: 28 MM (ref 15–32)
CITRATED KAOLIN ANGLE: 70.6 DEG (ref 63–78)
CITRATED KAOLIN ANGLE: 74.6 DEG (ref 63–78)
CITRATED KAOLIN ANGLE: 76.7 DEG (ref 63–78)
CITRATED KAOLIN K-TIME: 0.9 MINS (ref 0.8–2.1)
CITRATED KAOLIN K-TIME: 1.2 MINS (ref 0.8–2.1)
CITRATED KAOLIN K-TIME: 1.5 MINS (ref 0.8–2.1)
CITRATED KAOLIN MAXIMUM AMPLITUDE: 55.5 MM (ref 52–69)
CITRATED KAOLIN MAXIMUM AMPLITUDE: 57.7 MM (ref 52–69)
CITRATED KAOLIN MAXIMUM AMPLITUDE: 64 MM (ref 52–69)
CITRATED KAOLIN R-TIME: 4.2 MINS (ref 4.6–9.1)
CITRATED KAOLIN R-TIME: 5.2 MINS (ref 4.6–9.1)
CITRATED KAOLIN R-TIME: 6.7 MINS (ref 4.6–9.1)
CITRATED KAOLIN/HEPARINASE R-TIME: 4.4 MINS (ref 4.3–8.3)
CITRATED KAOLIN/HEPARINASE R-TIME: 5 MINS (ref 4.3–8.3)
CITRATED KAOLIN/HEPARINASE R-TIME: 6.2 MINS (ref 4.3–8.3)
CITRATED RAPIDTEG MAXIMUM AMPLITUDE: 54.7 MM (ref 52–70)
CITRATED RAPIDTEG MAXIMUM AMPLITUDE: 56.6 MM (ref 52–70)
CITRATED RAPIDTEG MAXIMUM AMPLITUDE: 64.5 MM (ref 52–70)
CO2 BLD-SCNC: 19 MMOL/L
CO2 SERPL-SCNC: 20 MMOL/L (ref 21–32)
COLLECT TIME,HTIME: 1239
COLLECT TIME,HTIME: 1940
CREAT SERPL-MCNC: 1.22 MG/DL (ref 0.8–1.5)
DIAGNOSIS, 93000: NORMAL
ERYTHROCYTE [DISTWIDTH] IN BLOOD BY AUTOMATED COUNT: 13.3 % (ref 11.9–14.6)
EXHALED MINUTE VOLUME, VE: 6.2 L/MIN
EXHALED MINUTE VOLUME, VE: 9.7 L/MIN
FIBRINOGEN PPP-MCNC: 259 MG/DL (ref 190–501)
FUNCTIONAL FIBRINOGEN LEVEL: 326.6 MG/DL (ref 278–581)
FUNCTIONAL FIBRINOGEN LEVEL: 343.1 MG/DL (ref 278–581)
FUNCTIONAL FIBRINOGEN LEVEL: 510.9 MG/DL (ref 278–581)
GAS FLOW.O2 O2 DELIVERY SYS: ABNORMAL L/MIN
GAS FLOW.O2 O2 DELIVERY SYS: ABNORMAL L/MIN
GAS FLOW.O2 SETTING OXYMISER: 18 BPM
GLUCOSE BLD STRIP.AUTO-MCNC: 115 MG/DL (ref 65–100)
GLUCOSE BLD STRIP.AUTO-MCNC: 122 MG/DL (ref 65–100)
GLUCOSE BLD STRIP.AUTO-MCNC: 127 MG/DL (ref 65–100)
GLUCOSE BLD STRIP.AUTO-MCNC: 128 MG/DL (ref 65–100)
GLUCOSE BLD STRIP.AUTO-MCNC: 130 MG/DL (ref 65–100)
GLUCOSE BLD STRIP.AUTO-MCNC: 132 MG/DL (ref 65–100)
GLUCOSE BLD STRIP.AUTO-MCNC: 134 MG/DL (ref 65–100)
GLUCOSE BLD STRIP.AUTO-MCNC: 142 MG/DL (ref 65–100)
GLUCOSE BLD STRIP.AUTO-MCNC: 142 MG/DL (ref 65–100)
GLUCOSE BLD STRIP.AUTO-MCNC: 143 MG/DL (ref 65–100)
GLUCOSE BLD STRIP.AUTO-MCNC: 144 MG/DL (ref 65–100)
GLUCOSE BLD STRIP.AUTO-MCNC: 147 MG/DL (ref 65–100)
GLUCOSE BLD STRIP.AUTO-MCNC: 151 MG/DL (ref 65–100)
GLUCOSE BLD STRIP.AUTO-MCNC: 151 MG/DL (ref 65–100)
GLUCOSE BLD STRIP.AUTO-MCNC: 152 MG/DL (ref 65–100)
GLUCOSE BLD STRIP.AUTO-MCNC: 99 MG/DL (ref 65–100)
GLUCOSE SERPL-MCNC: 145 MG/DL (ref 65–100)
HCO3 BLD-SCNC: 18.3 MMOL/L (ref 22–26)
HCO3 BLD-SCNC: 19.4 MMOL/L (ref 22–26)
HCO3 BLD-SCNC: 23.6 MMOL/L (ref 22–26)
HCO3 BLD-SCNC: 23.7 MMOL/L (ref 22–26)
HCO3 BLD-SCNC: 24.4 MMOL/L (ref 22–26)
HCO3 BLD-SCNC: 25.6 MMOL/L (ref 22–26)
HCT VFR BLD AUTO: 26.7 % (ref 41.1–50.3)
HCT VFR BLD AUTO: 29.7 % (ref 41.1–50.3)
HCT VFR BLD AUTO: 30.3 % (ref 41.1–50.3)
HGB BLD-MCNC: 8.7 G/DL (ref 13.6–17.2)
HGB BLD-MCNC: 9.8 G/DL (ref 13.6–17.2)
HGB BLD-MCNC: 9.9 G/DL (ref 13.6–17.2)
INR PPP: 1.4
MAGNESIUM SERPL-MCNC: 2.6 MG/DL (ref 1.8–2.4)
MAGNESIUM SERPL-MCNC: 2.7 MG/DL (ref 1.8–2.4)
MAGNESIUM SERPL-MCNC: 3 MG/DL (ref 1.8–2.4)
MCH RBC QN AUTO: 30.4 PG (ref 26.1–32.9)
MCHC RBC AUTO-ENTMCNC: 32.6 G/DL (ref 31.4–35)
MCV RBC AUTO: 93.4 FL (ref 79.6–97.8)
NRBC # BLD: 0 K/UL (ref 0–0.2)
O2/TOTAL GAS SETTING VFR VENT: 25 %
O2/TOTAL GAS SETTING VFR VENT: 45 %
P-R INTERVAL, ECG05: 182 MS
PCO2 BLD: 33.3 MMHG (ref 35–45)
PCO2 BLD: 35.3 MMHG (ref 35–45)
PCO2 BLD: 36 MMHG (ref 35–45)
PCO2 BLD: 36.3 MMHG (ref 35–45)
PCO2 BLD: 38.8 MMHG (ref 35–45)
PCO2 BLD: 39.5 MMHG (ref 35–45)
PEEP RESPIRATORY: 8 CMH2O
PEEP RESPIRATORY: 8 CMH2O
PH BLD: 7.32 [PH] (ref 7.35–7.45)
PH BLD: 7.37 [PH] (ref 7.35–7.45)
PH BLD: 7.4 [PH] (ref 7.35–7.45)
PH BLD: 7.42 [PH] (ref 7.35–7.45)
PH BLD: 7.43 [PH] (ref 7.35–7.45)
PH BLD: 7.43 [PH] (ref 7.35–7.45)
PLATELET # BLD AUTO: 65 K/UL (ref 150–450)
PMV BLD AUTO: 12.4 FL (ref 9.4–12.3)
PO2 BLD: 115 MMHG (ref 75–100)
PO2 BLD: 118 MMHG (ref 75–100)
PO2 BLD: 182 MMHG (ref 75–100)
PO2 BLD: 184 MMHG (ref 75–100)
PO2 BLD: 297 MMHG (ref 75–100)
PO2 BLD: 379 MMHG (ref 75–100)
POTASSIUM BLD-SCNC: 3.4 MMOL/L (ref 3.5–5.1)
POTASSIUM BLD-SCNC: 3.5 MMOL/L (ref 3.5–5.1)
POTASSIUM BLD-SCNC: 3.7 MMOL/L (ref 3.5–5.1)
POTASSIUM BLD-SCNC: 3.8 MMOL/L (ref 3.5–5.1)
POTASSIUM BLD-SCNC: 4.3 MMOL/L (ref 3.5–5.1)
POTASSIUM SERPL-SCNC: 3.5 MMOL/L (ref 3.5–5.1)
POTASSIUM SERPL-SCNC: 3.8 MMOL/L (ref 3.5–5.1)
POTASSIUM SERPL-SCNC: 4 MMOL/L (ref 3.5–5.1)
PROTHROMBIN TIME: 18 SEC (ref 12–14.7)
Q-T INTERVAL, ECG07: 454 MS
QRS DURATION, ECG06: 118 MS
QTC CALCULATION (BEZET), ECG08: 503 MS
RBC # BLD AUTO: 2.86 M/UL (ref 4.23–5.6)
SAO2 % BLD: 100 % (ref 95–98)
SAO2 % BLD: 98 % (ref 95–98)
SAO2 % BLD: 98 % (ref 95–98)
SERVICE CMNT-IMP: ABNORMAL
SERVICE CMNT-IMP: ABNORMAL
SODIUM BLD-SCNC: 138 MMOL/L (ref 136–145)
SODIUM BLD-SCNC: 139 MMOL/L (ref 136–145)
SODIUM BLD-SCNC: 140 MMOL/L (ref 136–145)
SODIUM BLD-SCNC: 141 MMOL/L (ref 136–145)
SODIUM BLD-SCNC: 141 MMOL/L (ref 136–145)
SODIUM SERPL-SCNC: 144 MMOL/L (ref 136–145)
SPECIMEN TYPE: ABNORMAL
SPECIMEN TYPE: ABNORMAL
VENTILATION MODE VENT: ABNORMAL
VENTILATION MODE VENT: ABNORMAL
VENTRICULAR RATE, ECG03: 74 BPM
VT SETTING VENT: 550 ML
WBC # BLD AUTO: 10.8 K/UL (ref 4.3–11.1)

## 2020-08-10 PROCEDURE — 74011636637 HC RX REV CODE- 636/637: Performed by: THORACIC SURGERY (CARDIOTHORACIC VASCULAR SURGERY)

## 2020-08-10 PROCEDURE — 06BQ0ZZ EXCISION OF LEFT SAPHENOUS VEIN, OPEN APPROACH: ICD-10-PCS | Performed by: THORACIC SURGERY (CARDIOTHORACIC VASCULAR SURGERY)

## 2020-08-10 PROCEDURE — 77030018547 HC SUT ETHBND1 J&J -B: Performed by: THORACIC SURGERY (CARDIOTHORACIC VASCULAR SURGERY)

## 2020-08-10 PROCEDURE — 84132 ASSAY OF SERUM POTASSIUM: CPT

## 2020-08-10 PROCEDURE — 74011250636 HC RX REV CODE- 250/636: Performed by: PHYSICIAN ASSISTANT

## 2020-08-10 PROCEDURE — 74011000250 HC RX REV CODE- 250: Performed by: THORACIC SURGERY (CARDIOTHORACIC VASCULAR SURGERY)

## 2020-08-10 PROCEDURE — 77030005537 HC CATH URETH BARD -A: Performed by: THORACIC SURGERY (CARDIOTHORACIC VASCULAR SURGERY)

## 2020-08-10 PROCEDURE — 77030010813: Performed by: THORACIC SURGERY (CARDIOTHORACIC VASCULAR SURGERY)

## 2020-08-10 PROCEDURE — 76010000155 HC AUTO TRANSFUSION/CELL SAVER: Performed by: THORACIC SURGERY (CARDIOTHORACIC VASCULAR SURGERY)

## 2020-08-10 PROCEDURE — 76060000041 HC ANESTHESIA 5 TO 5.5 HR: Performed by: THORACIC SURGERY (CARDIOTHORACIC VASCULAR SURGERY)

## 2020-08-10 PROCEDURE — 77030006689 HC BLD OPHTH BVR BD -A: Performed by: THORACIC SURGERY (CARDIOTHORACIC VASCULAR SURGERY)

## 2020-08-10 PROCEDURE — 65610000006 HC RM INTENSIVE CARE

## 2020-08-10 PROCEDURE — 74011250637 HC RX REV CODE- 250/637: Performed by: PHYSICIAN ASSISTANT

## 2020-08-10 PROCEDURE — C1729 CATH, DRAINAGE: HCPCS | Performed by: THORACIC SURGERY (CARDIOTHORACIC VASCULAR SURGERY)

## 2020-08-10 PROCEDURE — 77030002520 HC INSRT CLMP LATIS STLTH AMR -B: Performed by: THORACIC SURGERY (CARDIOTHORACIC VASCULAR SURGERY)

## 2020-08-10 PROCEDURE — P9045 ALBUMIN (HUMAN), 5%, 250 ML: HCPCS | Performed by: NURSE ANESTHETIST, CERTIFIED REGISTERED

## 2020-08-10 PROCEDURE — B24BZZ4 ULTRASONOGRAPHY OF HEART WITH AORTA, TRANSESOPHAGEAL: ICD-10-PCS | Performed by: THORACIC SURGERY (CARDIOTHORACIC VASCULAR SURGERY)

## 2020-08-10 PROCEDURE — 93005 ELECTROCARDIOGRAM TRACING: CPT | Performed by: THORACIC SURGERY (CARDIOTHORACIC VASCULAR SURGERY)

## 2020-08-10 PROCEDURE — 82803 BLOOD GASES ANY COMBINATION: CPT

## 2020-08-10 PROCEDURE — 77030020751 HC FLTR TBNG TRNSFUS HAEM -A: Performed by: NURSE ANESTHETIST, CERTIFIED REGISTERED

## 2020-08-10 PROCEDURE — 77030040922 HC BLNKT HYPOTHRM STRY -A

## 2020-08-10 PROCEDURE — 94002 VENT MGMT INPAT INIT DAY: CPT

## 2020-08-10 PROCEDURE — 77030013292 HC BOWL MX PRSM J&J -A: Performed by: NURSE ANESTHETIST, CERTIFIED REGISTERED

## 2020-08-10 PROCEDURE — 77030002970 HC SUT PLEDG TELE -A: Performed by: THORACIC SURGERY (CARDIOTHORACIC VASCULAR SURGERY)

## 2020-08-10 PROCEDURE — 77030009995: Performed by: THORACIC SURGERY (CARDIOTHORACIC VASCULAR SURGERY)

## 2020-08-10 PROCEDURE — 74011000258 HC RX REV CODE- 258: Performed by: THORACIC SURGERY (CARDIOTHORACIC VASCULAR SURGERY)

## 2020-08-10 PROCEDURE — 77030020230: Performed by: ANESTHESIOLOGY

## 2020-08-10 PROCEDURE — 74011250637 HC RX REV CODE- 250/637: Performed by: ANESTHESIOLOGY

## 2020-08-10 PROCEDURE — 5A1221Z PERFORMANCE OF CARDIAC OUTPUT, CONTINUOUS: ICD-10-PCS | Performed by: THORACIC SURGERY (CARDIOTHORACIC VASCULAR SURGERY)

## 2020-08-10 PROCEDURE — 74011000250 HC RX REV CODE- 250: Performed by: ANESTHESIOLOGY

## 2020-08-10 PROCEDURE — 77030013794 HC KT TRNSDUC BLD EDWD -B: Performed by: ANESTHESIOLOGY

## 2020-08-10 PROCEDURE — 77030025646 HC AUTOTRNSFUS KT TERU -C: Performed by: THORACIC SURGERY (CARDIOTHORACIC VASCULAR SURGERY)

## 2020-08-10 PROCEDURE — 86580 TB INTRADERMAL TEST: CPT | Performed by: THORACIC SURGERY (CARDIOTHORACIC VASCULAR SURGERY)

## 2020-08-10 PROCEDURE — 85027 COMPLETE CBC AUTOMATED: CPT

## 2020-08-10 PROCEDURE — 71045 X-RAY EXAM CHEST 1 VIEW: CPT

## 2020-08-10 PROCEDURE — 77030002996 HC SUT SLK J&J -A: Performed by: THORACIC SURGERY (CARDIOTHORACIC VASCULAR SURGERY)

## 2020-08-10 PROCEDURE — 77030018729 HC ELECTRD DEFIB PAD CARD -B: Performed by: THORACIC SURGERY (CARDIOTHORACIC VASCULAR SURGERY)

## 2020-08-10 PROCEDURE — 74011250636 HC RX REV CODE- 250/636

## 2020-08-10 PROCEDURE — 77030003010 HC SUT SURG STL J&J -B: Performed by: THORACIC SURGERY (CARDIOTHORACIC VASCULAR SURGERY)

## 2020-08-10 PROCEDURE — 77030022953: Performed by: THORACIC SURGERY (CARDIOTHORACIC VASCULAR SURGERY)

## 2020-08-10 PROCEDURE — P9045 ALBUMIN (HUMAN), 5%, 250 ML: HCPCS | Performed by: THORACIC SURGERY (CARDIOTHORACIC VASCULAR SURGERY)

## 2020-08-10 PROCEDURE — 74011250637 HC RX REV CODE- 250/637: Performed by: THORACIC SURGERY (CARDIOTHORACIC VASCULAR SURGERY)

## 2020-08-10 PROCEDURE — 77030005518 HC CATH URETH FOL 2W BARD -B: Performed by: THORACIC SURGERY (CARDIOTHORACIC VASCULAR SURGERY)

## 2020-08-10 PROCEDURE — 77030002987 HC SUT PROL J&J -B: Performed by: THORACIC SURGERY (CARDIOTHORACIC VASCULAR SURGERY)

## 2020-08-10 PROCEDURE — 85730 THROMBOPLASTIN TIME PARTIAL: CPT

## 2020-08-10 PROCEDURE — 77030034888 HC SUT PROL 2 J&J -B: Performed by: THORACIC SURGERY (CARDIOTHORACIC VASCULAR SURGERY)

## 2020-08-10 PROCEDURE — 77030005401 HC CATH RAD ARRO -A: Performed by: ANESTHESIOLOGY

## 2020-08-10 PROCEDURE — 77030019908 HC STETH ESOPH SIMS -A: Performed by: NURSE ANESTHETIST, CERTIFIED REGISTERED

## 2020-08-10 PROCEDURE — 77030013797 HC KT TRNSDUC PRSSR EDWD -A: Performed by: THORACIC SURGERY (CARDIOTHORACIC VASCULAR SURGERY)

## 2020-08-10 PROCEDURE — 5A1223Z PERFORMANCE OF CARDIAC PACING, CONTINUOUS: ICD-10-PCS | Performed by: THORACIC SURGERY (CARDIOTHORACIC VASCULAR SURGERY)

## 2020-08-10 PROCEDURE — 74011000302 HC RX REV CODE- 302: Performed by: THORACIC SURGERY (CARDIOTHORACIC VASCULAR SURGERY)

## 2020-08-10 PROCEDURE — 36415 COLL VENOUS BLD VENIPUNCTURE: CPT

## 2020-08-10 PROCEDURE — 77030013861 HC PNCH AORT CLNCUT QUES -B: Performed by: THORACIC SURGERY (CARDIOTHORACIC VASCULAR SURGERY)

## 2020-08-10 PROCEDURE — 85347 COAGULATION TIME ACTIVATED: CPT

## 2020-08-10 PROCEDURE — 99223 1ST HOSP IP/OBS HIGH 75: CPT | Performed by: INTERNAL MEDICINE

## 2020-08-10 PROCEDURE — 77030034927 HC PK PROC CPB INSPIRE PERF LIVA -F: Performed by: THORACIC SURGERY (CARDIOTHORACIC VASCULAR SURGERY)

## 2020-08-10 PROCEDURE — 82947 ASSAY GLUCOSE BLOOD QUANT: CPT

## 2020-08-10 PROCEDURE — 77030018571 HC SUT PROL1 J&J -B: Performed by: THORACIC SURGERY (CARDIOTHORACIC VASCULAR SURGERY)

## 2020-08-10 PROCEDURE — 74011000250 HC RX REV CODE- 250: Performed by: NURSE ANESTHETIST, CERTIFIED REGISTERED

## 2020-08-10 PROCEDURE — 77030008771 HC TU NG SALEM SUMP -A: Performed by: NURSE ANESTHETIST, CERTIFIED REGISTERED

## 2020-08-10 PROCEDURE — 85384 FIBRINOGEN ACTIVITY: CPT

## 2020-08-10 PROCEDURE — 77030010938 HC CLP LIG TELE -A: Performed by: THORACIC SURGERY (CARDIOTHORACIC VASCULAR SURGERY)

## 2020-08-10 PROCEDURE — 77030025827 HC BG BLD DNR AUTLG MEDT -A: Performed by: THORACIC SURGERY (CARDIOTHORACIC VASCULAR SURGERY)

## 2020-08-10 PROCEDURE — 77030014007 HC SPNG HEMSTAT J&J -B: Performed by: THORACIC SURGERY (CARDIOTHORACIC VASCULAR SURGERY)

## 2020-08-10 PROCEDURE — 77030002966 HC SUT PDS J&J -A: Performed by: THORACIC SURGERY (CARDIOTHORACIC VASCULAR SURGERY)

## 2020-08-10 PROCEDURE — 77030037088 HC TUBE ENDOTRACH ORAL NSL COVD-A: Performed by: NURSE ANESTHETIST, CERTIFIED REGISTERED

## 2020-08-10 PROCEDURE — 74011250636 HC RX REV CODE- 250/636: Performed by: THORACIC SURGERY (CARDIOTHORACIC VASCULAR SURGERY)

## 2020-08-10 PROCEDURE — 77030009355 HC CRDPLG DEL SET QUES -C: Performed by: THORACIC SURGERY (CARDIOTHORACIC VASCULAR SURGERY)

## 2020-08-10 PROCEDURE — 74011250636 HC RX REV CODE- 250/636: Performed by: NURSE ANESTHETIST, CERTIFIED REGISTERED

## 2020-08-10 PROCEDURE — 80048 BASIC METABOLIC PNL TOTAL CA: CPT

## 2020-08-10 PROCEDURE — 77030002986 HC SUT PROL J&J -A: Performed by: THORACIC SURGERY (CARDIOTHORACIC VASCULAR SURGERY)

## 2020-08-10 PROCEDURE — C1751 CATH, INF, PER/CENT/MIDLINE: HCPCS | Performed by: ANESTHESIOLOGY

## 2020-08-10 PROCEDURE — 76010000199 HC CV SURG 4.5 TO 5 HR INTENSV-TIER 1: Performed by: THORACIC SURGERY (CARDIOTHORACIC VASCULAR SURGERY)

## 2020-08-10 PROCEDURE — 77030031139 HC SUT VCRL2 J&J -A: Performed by: THORACIC SURGERY (CARDIOTHORACIC VASCULAR SURGERY)

## 2020-08-10 PROCEDURE — 77030012390 HC DRN CHST BTL GTNG -B: Performed by: THORACIC SURGERY (CARDIOTHORACIC VASCULAR SURGERY)

## 2020-08-10 PROCEDURE — 77030020407 HC IV BLD WRMR ST 3M -A: Performed by: NURSE ANESTHETIST, CERTIFIED REGISTERED

## 2020-08-10 PROCEDURE — 77030016564 HC BLD STRNL SAW4 CNMD -B: Performed by: THORACIC SURGERY (CARDIOTHORACIC VASCULAR SURGERY)

## 2020-08-10 PROCEDURE — 77030039425 HC BLD LARYNG TRULITE DISP TELE -A: Performed by: NURSE ANESTHETIST, CERTIFIED REGISTERED

## 2020-08-10 PROCEDURE — 77030020751 HC FLTR TBNG TRNSFUS HAEM -A: Performed by: THORACIC SURGERY (CARDIOTHORACIC VASCULAR SURGERY)

## 2020-08-10 PROCEDURE — 85018 HEMOGLOBIN: CPT

## 2020-08-10 PROCEDURE — 77030021177: Performed by: THORACIC SURGERY (CARDIOTHORACIC VASCULAR SURGERY)

## 2020-08-10 PROCEDURE — 77030003037 HC SUT WRE STRNOTMY AEMC -B: Performed by: THORACIC SURGERY (CARDIOTHORACIC VASCULAR SURGERY)

## 2020-08-10 PROCEDURE — 021009W BYPASS CORONARY ARTERY, ONE ARTERY FROM AORTA WITH AUTOLOGOUS VENOUS TISSUE, OPEN APPROACH: ICD-10-PCS | Performed by: THORACIC SURGERY (CARDIOTHORACIC VASCULAR SURGERY)

## 2020-08-10 PROCEDURE — 77030041244 HC CBL PACE EXT TEMP REMG -B: Performed by: THORACIC SURGERY (CARDIOTHORACIC VASCULAR SURGERY)

## 2020-08-10 PROCEDURE — 82962 GLUCOSE BLOOD TEST: CPT

## 2020-08-10 PROCEDURE — 83735 ASSAY OF MAGNESIUM: CPT

## 2020-08-10 PROCEDURE — 77030003034 HC SUT WRE CRD J&J -B: Performed by: THORACIC SURGERY (CARDIOTHORACIC VASCULAR SURGERY)

## 2020-08-10 PROCEDURE — 77030010512 HC APPL CLP LIG J&J -C: Performed by: THORACIC SURGERY (CARDIOTHORACIC VASCULAR SURGERY)

## 2020-08-10 PROCEDURE — 02100Z9 BYPASS CORONARY ARTERY, ONE ARTERY FROM LEFT INTERNAL MAMMARY, OPEN APPROACH: ICD-10-PCS | Performed by: THORACIC SURGERY (CARDIOTHORACIC VASCULAR SURGERY)

## 2020-08-10 PROCEDURE — 77030018548 HC SUT ETHBND2 J&J -B: Performed by: THORACIC SURGERY (CARDIOTHORACIC VASCULAR SURGERY)

## 2020-08-10 PROCEDURE — 77030008771 HC TU NG SALEM SUMP -A: Performed by: THORACIC SURGERY (CARDIOTHORACIC VASCULAR SURGERY)

## 2020-08-10 PROCEDURE — 3E080GC INTRODUCTION OF OTHER THERAPEUTIC SUBSTANCE INTO HEART, OPEN APPROACH: ICD-10-PCS | Performed by: THORACIC SURGERY (CARDIOTHORACIC VASCULAR SURGERY)

## 2020-08-10 PROCEDURE — 74011250636 HC RX REV CODE- 250/636: Performed by: ANESTHESIOLOGY

## 2020-08-10 PROCEDURE — P9047 ALBUMIN (HUMAN), 25%, 50ML: HCPCS

## 2020-08-10 PROCEDURE — 36600 WITHDRAWAL OF ARTERIAL BLOOD: CPT

## 2020-08-10 PROCEDURE — 85610 PROTHROMBIN TIME: CPT

## 2020-08-10 PROCEDURE — C1769 GUIDE WIRE: HCPCS | Performed by: THORACIC SURGERY (CARDIOTHORACIC VASCULAR SURGERY)

## 2020-08-10 PROCEDURE — 74011000258 HC RX REV CODE- 258

## 2020-08-10 PROCEDURE — 74011000250 HC RX REV CODE- 250

## 2020-08-10 RX ORDER — PAPAVERINE HYDROCHLORIDE 30 MG/ML
INJECTION INTRAMUSCULAR; INTRAVENOUS AS NEEDED
Status: DISCONTINUED | OUTPATIENT
Start: 2020-08-10 | End: 2020-08-10 | Stop reason: HOSPADM

## 2020-08-10 RX ORDER — MIDAZOLAM HYDROCHLORIDE 1 MG/ML
1 INJECTION, SOLUTION INTRAMUSCULAR; INTRAVENOUS
Status: DISCONTINUED | OUTPATIENT
Start: 2020-08-10 | End: 2020-08-11

## 2020-08-10 RX ORDER — GUAIFENESIN 100 MG/5ML
81 LIQUID (ML) ORAL DAILY
Status: DISCONTINUED | OUTPATIENT
Start: 2020-08-11 | End: 2020-08-11

## 2020-08-10 RX ORDER — LIDOCAINE HYDROCHLORIDE 10 MG/ML
0.5 INJECTION INFILTRATION; PERINEURAL ONCE
Status: COMPLETED | OUTPATIENT
Start: 2020-08-10 | End: 2020-08-10

## 2020-08-10 RX ORDER — ALBUMIN HUMAN 50 G/1000ML
SOLUTION INTRAVENOUS
Status: COMPLETED | OUTPATIENT
Start: 2020-08-10 | End: 2020-08-10

## 2020-08-10 RX ORDER — FAMOTIDINE 20 MG/1
20 TABLET, FILM COATED ORAL DAILY
Status: DISCONTINUED | OUTPATIENT
Start: 2020-08-10 | End: 2020-08-11

## 2020-08-10 RX ORDER — NITROGLYCERIN 20 MG/100ML
INJECTION INTRAVENOUS
Status: DISCONTINUED | OUTPATIENT
Start: 2020-08-10 | End: 2020-08-10 | Stop reason: HOSPADM

## 2020-08-10 RX ORDER — ONDANSETRON 2 MG/ML
INJECTION INTRAMUSCULAR; INTRAVENOUS
Status: COMPLETED
Start: 2020-08-10 | End: 2020-08-10

## 2020-08-10 RX ORDER — SODIUM CHLORIDE 0.9 % (FLUSH) 0.9 %
5-40 SYRINGE (ML) INJECTION EVERY 8 HOURS
Status: DISCONTINUED | OUTPATIENT
Start: 2020-08-10 | End: 2020-08-11

## 2020-08-10 RX ORDER — SODIUM CHLORIDE 9 MG/ML
INJECTION, SOLUTION INTRAVENOUS
Status: DISCONTINUED | OUTPATIENT
Start: 2020-08-10 | End: 2020-08-10 | Stop reason: HOSPADM

## 2020-08-10 RX ORDER — CHLORHEXIDINE GLUCONATE 1.2 MG/ML
10 RINSE ORAL 2 TIMES DAILY
Status: DISCONTINUED | OUTPATIENT
Start: 2020-08-10 | End: 2020-08-11

## 2020-08-10 RX ORDER — SODIUM BICARBONATE 84 MG/ML
50 INJECTION, SOLUTION INTRAVENOUS AS NEEDED
Status: DISCONTINUED | OUTPATIENT
Start: 2020-08-10 | End: 2020-08-11

## 2020-08-10 RX ORDER — VECURONIUM BROMIDE FOR INJECTION 1 MG/ML
INJECTION, POWDER, LYOPHILIZED, FOR SOLUTION INTRAVENOUS AS NEEDED
Status: DISCONTINUED | OUTPATIENT
Start: 2020-08-10 | End: 2020-08-10 | Stop reason: HOSPADM

## 2020-08-10 RX ORDER — MUPIROCIN 20 MG/G
OINTMENT TOPICAL 2 TIMES DAILY
Status: DISCONTINUED | OUTPATIENT
Start: 2020-08-10 | End: 2020-08-10

## 2020-08-10 RX ORDER — MIDAZOLAM HYDROCHLORIDE 1 MG/ML
2 INJECTION, SOLUTION INTRAMUSCULAR; INTRAVENOUS ONCE
Status: DISCONTINUED | OUTPATIENT
Start: 2020-08-10 | End: 2020-08-10 | Stop reason: HOSPADM

## 2020-08-10 RX ORDER — CEFAZOLIN SODIUM/WATER 2 G/20 ML
2 SYRINGE (ML) INTRAVENOUS ONCE
Status: COMPLETED | OUTPATIENT
Start: 2020-08-10 | End: 2020-08-10

## 2020-08-10 RX ORDER — MAGNESIUM SULFATE 1 G/100ML
1 INJECTION INTRAVENOUS AS NEEDED
Status: DISCONTINUED | OUTPATIENT
Start: 2020-08-10 | End: 2020-08-11

## 2020-08-10 RX ORDER — METOPROLOL TARTRATE 25 MG/1
25 TABLET, FILM COATED ORAL EVERY 12 HOURS
Status: DISCONTINUED | OUTPATIENT
Start: 2020-08-11 | End: 2020-08-11

## 2020-08-10 RX ORDER — NALOXONE HYDROCHLORIDE 0.4 MG/ML
0.4 INJECTION, SOLUTION INTRAMUSCULAR; INTRAVENOUS; SUBCUTANEOUS AS NEEDED
Status: DISCONTINUED | OUTPATIENT
Start: 2020-08-10 | End: 2020-08-11

## 2020-08-10 RX ORDER — AMIODARONE HYDROCHLORIDE 200 MG/1
200 TABLET ORAL EVERY 12 HOURS
Status: DISCONTINUED | OUTPATIENT
Start: 2020-08-10 | End: 2020-08-11

## 2020-08-10 RX ORDER — SODIUM CHLORIDE 0.9 % (FLUSH) 0.9 %
5-40 SYRINGE (ML) INJECTION AS NEEDED
Status: DISCONTINUED | OUTPATIENT
Start: 2020-08-10 | End: 2020-08-11

## 2020-08-10 RX ORDER — MIDAZOLAM HYDROCHLORIDE 1 MG/ML
INJECTION, SOLUTION INTRAMUSCULAR; INTRAVENOUS AS NEEDED
Status: DISCONTINUED | OUTPATIENT
Start: 2020-08-10 | End: 2020-08-10 | Stop reason: HOSPADM

## 2020-08-10 RX ORDER — PROTAMINE SULFATE 10 MG/ML
INJECTION, SOLUTION INTRAVENOUS AS NEEDED
Status: DISCONTINUED | OUTPATIENT
Start: 2020-08-10 | End: 2020-08-10 | Stop reason: HOSPADM

## 2020-08-10 RX ORDER — FAMOTIDINE 20 MG/1
20 TABLET, FILM COATED ORAL ONCE
Status: COMPLETED | OUTPATIENT
Start: 2020-08-10 | End: 2020-08-10

## 2020-08-10 RX ORDER — ROCURONIUM BROMIDE 10 MG/ML
INJECTION, SOLUTION INTRAVENOUS AS NEEDED
Status: DISCONTINUED | OUTPATIENT
Start: 2020-08-10 | End: 2020-08-10 | Stop reason: HOSPADM

## 2020-08-10 RX ORDER — EPHEDRINE SULFATE/0.9% NACL/PF 50 MG/5 ML
SYRINGE (ML) INTRAVENOUS AS NEEDED
Status: DISCONTINUED | OUTPATIENT
Start: 2020-08-10 | End: 2020-08-10 | Stop reason: HOSPADM

## 2020-08-10 RX ORDER — SODIUM CHLORIDE, SODIUM LACTATE, POTASSIUM CHLORIDE, CALCIUM CHLORIDE 600; 310; 30; 20 MG/100ML; MG/100ML; MG/100ML; MG/100ML
100 INJECTION, SOLUTION INTRAVENOUS CONTINUOUS
Status: DISCONTINUED | OUTPATIENT
Start: 2020-08-10 | End: 2020-08-10 | Stop reason: HOSPADM

## 2020-08-10 RX ORDER — CEFAZOLIN SODIUM 1 G/3ML
INJECTION, POWDER, FOR SOLUTION INTRAMUSCULAR; INTRAVENOUS AS NEEDED
Status: DISCONTINUED | OUTPATIENT
Start: 2020-08-10 | End: 2020-08-10 | Stop reason: HOSPADM

## 2020-08-10 RX ORDER — DEXTROSE 50 % IN WATER (D50W) INTRAVENOUS SYRINGE
25 AS NEEDED
Status: DISCONTINUED | OUTPATIENT
Start: 2020-08-10 | End: 2020-08-11

## 2020-08-10 RX ORDER — CEFAZOLIN SODIUM/WATER 2 G/20 ML
2 SYRINGE (ML) INTRAVENOUS EVERY 8 HOURS
Status: COMPLETED | OUTPATIENT
Start: 2020-08-10 | End: 2020-08-11

## 2020-08-10 RX ORDER — DEXTROSE, SODIUM CHLORIDE, AND POTASSIUM CHLORIDE 5; .45; .15 G/100ML; G/100ML; G/100ML
25 INJECTION INTRAVENOUS CONTINUOUS
Status: DISCONTINUED | OUTPATIENT
Start: 2020-08-10 | End: 2020-08-11

## 2020-08-10 RX ORDER — AMIODARONE HYDROCHLORIDE 200 MG/1
600 TABLET ORAL ONCE
Status: COMPLETED | OUTPATIENT
Start: 2020-08-10 | End: 2020-08-10

## 2020-08-10 RX ORDER — HEPARIN SODIUM 1000 [USP'U]/ML
INJECTION, SOLUTION INTRAVENOUS; SUBCUTANEOUS AS NEEDED
Status: DISCONTINUED | OUTPATIENT
Start: 2020-08-10 | End: 2020-08-10 | Stop reason: HOSPADM

## 2020-08-10 RX ORDER — SODIUM CHLORIDE, SODIUM LACTATE, POTASSIUM CHLORIDE, CALCIUM CHLORIDE 600; 310; 30; 20 MG/100ML; MG/100ML; MG/100ML; MG/100ML
INJECTION, SOLUTION INTRAVENOUS
Status: DISCONTINUED | OUTPATIENT
Start: 2020-08-10 | End: 2020-08-10 | Stop reason: HOSPADM

## 2020-08-10 RX ORDER — ONDANSETRON 2 MG/ML
4 INJECTION INTRAMUSCULAR; INTRAVENOUS
Status: DISCONTINUED | OUTPATIENT
Start: 2020-08-10 | End: 2020-08-11

## 2020-08-10 RX ORDER — NITROGLYCERIN 20 MG/100ML
10-100 INJECTION INTRAVENOUS
Status: DISCONTINUED | OUTPATIENT
Start: 2020-08-10 | End: 2020-08-11

## 2020-08-10 RX ORDER — SODIUM CHLORIDE 9 MG/ML
25 INJECTION, SOLUTION INTRAVENOUS CONTINUOUS
Status: DISCONTINUED | OUTPATIENT
Start: 2020-08-10 | End: 2020-08-11

## 2020-08-10 RX ORDER — POTASSIUM CHLORIDE 14.9 MG/ML
10 INJECTION INTRAVENOUS AS NEEDED
Status: DISCONTINUED | OUTPATIENT
Start: 2020-08-10 | End: 2020-08-11

## 2020-08-10 RX ORDER — FENTANYL CITRATE 50 UG/ML
INJECTION, SOLUTION INTRAMUSCULAR; INTRAVENOUS AS NEEDED
Status: DISCONTINUED | OUTPATIENT
Start: 2020-08-10 | End: 2020-08-10 | Stop reason: HOSPADM

## 2020-08-10 RX ORDER — LIDOCAINE HCL/PF 100 MG/5ML
50-100 SYRINGE (ML) INTRAVENOUS
Status: ACTIVE | OUTPATIENT
Start: 2020-08-10 | End: 2020-08-11

## 2020-08-10 RX ORDER — PROPOFOL 10 MG/ML
0-50 VIAL (ML) INTRAVENOUS
Status: DISCONTINUED | OUTPATIENT
Start: 2020-08-10 | End: 2020-08-11

## 2020-08-10 RX ORDER — ATORVASTATIN CALCIUM 80 MG/1
80 TABLET, FILM COATED ORAL
Status: DISCONTINUED | OUTPATIENT
Start: 2020-08-10 | End: 2020-08-11

## 2020-08-10 RX ORDER — OXYCODONE AND ACETAMINOPHEN 5; 325 MG/1; MG/1
1 TABLET ORAL
Status: DISCONTINUED | OUTPATIENT
Start: 2020-08-10 | End: 2020-08-11 | Stop reason: SDUPTHER

## 2020-08-10 RX ORDER — MORPHINE SULFATE 10 MG/ML
3-5 INJECTION, SOLUTION INTRAMUSCULAR; INTRAVENOUS
Status: DISCONTINUED | OUTPATIENT
Start: 2020-08-10 | End: 2020-08-11

## 2020-08-10 RX ORDER — ALBUMIN HUMAN 50 G/1000ML
SOLUTION INTRAVENOUS AS NEEDED
Status: DISCONTINUED | OUTPATIENT
Start: 2020-08-10 | End: 2020-08-10 | Stop reason: HOSPADM

## 2020-08-10 RX ADMIN — SODIUM CHLORIDE, SODIUM LACTATE, POTASSIUM CHLORIDE, AND CALCIUM CHLORIDE 1000 ML: 600; 310; 30; 20 INJECTION, SOLUTION INTRAVENOUS at 06:01

## 2020-08-10 RX ADMIN — FENTANYL CITRATE 50 MCG: 50 INJECTION INTRAMUSCULAR; INTRAVENOUS at 10:12

## 2020-08-10 RX ADMIN — SODIUM CHLORIDE, SODIUM LACTATE, POTASSIUM CHLORIDE, AND CALCIUM CHLORIDE: 600; 310; 30; 20 INJECTION, SOLUTION INTRAVENOUS at 08:30

## 2020-08-10 RX ADMIN — MIDAZOLAM 2 MG: 1 INJECTION INTRAMUSCULAR; INTRAVENOUS at 08:03

## 2020-08-10 RX ADMIN — OXYCODONE HYDROCHLORIDE AND ACETAMINOPHEN 1 TABLET: 5; 325 TABLET ORAL at 22:13

## 2020-08-10 RX ADMIN — SODIUM CHLORIDE 1 G/HR: 900 INJECTION, SOLUTION INTRAVENOUS at 09:30

## 2020-08-10 RX ADMIN — FENTANYL CITRATE 50 MCG: 50 INJECTION INTRAMUSCULAR; INTRAVENOUS at 10:10

## 2020-08-10 RX ADMIN — AMIODARONE HYDROCHLORIDE 600 MG: 200 TABLET ORAL at 06:01

## 2020-08-10 RX ADMIN — VECURONIUM BROMIDE 2 MG: 1 INJECTION, POWDER, LYOPHILIZED, FOR SOLUTION INTRAVENOUS at 10:30

## 2020-08-10 RX ADMIN — VECURONIUM BROMIDE 5 MG: 1 INJECTION, POWDER, LYOPHILIZED, FOR SOLUTION INTRAVENOUS at 08:45

## 2020-08-10 RX ADMIN — Medication 10 ML: at 21:38

## 2020-08-10 RX ADMIN — MIDAZOLAM 3 MG: 1 INJECTION INTRAMUSCULAR; INTRAVENOUS at 09:05

## 2020-08-10 RX ADMIN — PROTAMINE SULFATE 300 MG: 10 INJECTION, SOLUTION INTRAVENOUS at 11:19

## 2020-08-10 RX ADMIN — Medication 2 G: at 12:01

## 2020-08-10 RX ADMIN — PHENYLEPHRINE HYDROCHLORIDE 120 MCG: 10 INJECTION INTRAVENOUS at 08:20

## 2020-08-10 RX ADMIN — CHLORHEXIDINE GLUCONATE 10 ML: 1.2 RINSE ORAL at 20:00

## 2020-08-10 RX ADMIN — POTASSIUM CHLORIDE 10 MEQ: 14.9 INJECTION, SOLUTION INTRAVENOUS at 17:30

## 2020-08-10 RX ADMIN — Medication 5 MG: at 08:35

## 2020-08-10 RX ADMIN — VECURONIUM BROMIDE 2 MG: 1 INJECTION, POWDER, LYOPHILIZED, FOR SOLUTION INTRAVENOUS at 09:47

## 2020-08-10 RX ADMIN — EPINEPHRINE 0.02 MCG/KG/MIN: 1 INJECTION INTRAMUSCULAR; INTRAVENOUS; SUBCUTANEOUS at 11:12

## 2020-08-10 RX ADMIN — Medication 2 G: at 09:00

## 2020-08-10 RX ADMIN — PHENYLEPHRINE HYDROCHLORIDE 60 MCG: 10 INJECTION INTRAVENOUS at 08:55

## 2020-08-10 RX ADMIN — FENTANYL CITRATE 100 MCG: 50 INJECTION INTRAMUSCULAR; INTRAVENOUS at 09:05

## 2020-08-10 RX ADMIN — FENTANYL CITRATE 150 MCG: 50 INJECTION INTRAMUSCULAR; INTRAVENOUS at 09:06

## 2020-08-10 RX ADMIN — ALBUMIN HUMAN 250 ML: 0.05 INJECTION, SOLUTION INTRAVENOUS at 11:49

## 2020-08-10 RX ADMIN — TUBERCULIN PURIFIED PROTEIN DERIVATIVE 5 UNITS: 5 INJECTION, SOLUTION INTRADERMAL at 21:00

## 2020-08-10 RX ADMIN — MIDAZOLAM 2 MG: 1 INJECTION INTRAMUSCULAR; INTRAVENOUS at 11:35

## 2020-08-10 RX ADMIN — FENTANYL CITRATE 50 MCG: 50 INJECTION INTRAMUSCULAR; INTRAVENOUS at 11:32

## 2020-08-10 RX ADMIN — FENTANYL CITRATE 250 MCG: 50 INJECTION INTRAMUSCULAR; INTRAVENOUS at 08:03

## 2020-08-10 RX ADMIN — NITROGLYCERIN 10 MCG/MIN: 200 INJECTION, SOLUTION INTRAVENOUS at 08:30

## 2020-08-10 RX ADMIN — SODIUM CHLORIDE 10 G: 900 INJECTION, SOLUTION INTRAVENOUS at 08:30

## 2020-08-10 RX ADMIN — FAMOTIDINE 20 MG: 20 TABLET, FILM COATED ORAL at 06:01

## 2020-08-10 RX ADMIN — PHENYLEPHRINE HYDROCHLORIDE 60 MCG: 10 INJECTION INTRAVENOUS at 08:25

## 2020-08-10 RX ADMIN — ROCURONIUM BROMIDE 50 MG: 10 INJECTION, SOLUTION INTRAVENOUS at 08:06

## 2020-08-10 RX ADMIN — Medication 10 ML: at 16:59

## 2020-08-10 RX ADMIN — Medication 5 MG: at 10:00

## 2020-08-10 RX ADMIN — Medication 5 MG: at 08:20

## 2020-08-10 RX ADMIN — MIDAZOLAM 3 MG: 1 INJECTION INTRAMUSCULAR; INTRAVENOUS at 10:49

## 2020-08-10 RX ADMIN — ATORVASTATIN CALCIUM 80 MG: 80 TABLET, FILM COATED ORAL at 21:46

## 2020-08-10 RX ADMIN — Medication 5 MG: at 10:06

## 2020-08-10 RX ADMIN — Medication 5 MG: at 08:47

## 2020-08-10 RX ADMIN — MORPHINE SULFATE 3 MG: 10 INJECTION INTRAVENOUS at 17:49

## 2020-08-10 RX ADMIN — PHENYLEPHRINE HYDROCHLORIDE 60 MCG: 10 INJECTION INTRAVENOUS at 08:35

## 2020-08-10 RX ADMIN — SODIUM CHLORIDE: 9 INJECTION, SOLUTION INTRAVENOUS at 11:54

## 2020-08-10 RX ADMIN — Medication 5 MG: at 08:25

## 2020-08-10 RX ADMIN — ONDANSETRON 4 MG: 2 INJECTION INTRAMUSCULAR; INTRAVENOUS at 21:31

## 2020-08-10 RX ADMIN — SODIUM CHLORIDE 25 ML/HR: 900 INJECTION, SOLUTION INTRAVENOUS at 12:30

## 2020-08-10 RX ADMIN — Medication 5 MG: at 08:55

## 2020-08-10 RX ADMIN — PHENYLEPHRINE HYDROCHLORIDE 60 MCG: 10 INJECTION INTRAVENOUS at 09:26

## 2020-08-10 RX ADMIN — ROCURONIUM BROMIDE 50 MG: 10 INJECTION, SOLUTION INTRAVENOUS at 10:49

## 2020-08-10 RX ADMIN — Medication 5 MG: at 08:16

## 2020-08-10 RX ADMIN — MIDAZOLAM 4 MG: 1 INJECTION INTRAMUSCULAR; INTRAVENOUS at 08:01

## 2020-08-10 RX ADMIN — PHENYLEPHRINE HYDROCHLORIDE 120 MCG: 10 INJECTION INTRAVENOUS at 10:25

## 2020-08-10 RX ADMIN — FAMOTIDINE 20 MG: 20 TABLET, FILM COATED ORAL at 21:46

## 2020-08-10 RX ADMIN — FENTANYL CITRATE 500 MCG: 50 INJECTION INTRAMUSCULAR; INTRAVENOUS at 08:01

## 2020-08-10 RX ADMIN — Medication 5 MG: at 09:05

## 2020-08-10 RX ADMIN — SODIUM CHLORIDE 1 UNITS/HR: 900 INJECTION, SOLUTION INTRAVENOUS at 11:51

## 2020-08-10 RX ADMIN — Medication 5 MG: at 09:19

## 2020-08-10 RX ADMIN — PHENYLEPHRINE HYDROCHLORIDE 60 MCG: 10 INJECTION INTRAVENOUS at 08:16

## 2020-08-10 RX ADMIN — MIDAZOLAM 1 MG: 1 INJECTION INTRAMUSCULAR; INTRAVENOUS at 07:42

## 2020-08-10 RX ADMIN — PHENYLEPHRINE HYDROCHLORIDE 60 MCG: 10 INJECTION INTRAVENOUS at 10:28

## 2020-08-10 RX ADMIN — SODIUM CHLORIDE: 9 INJECTION, SOLUTION INTRAVENOUS at 08:30

## 2020-08-10 RX ADMIN — Medication 3 AMPULE: at 06:01

## 2020-08-10 RX ADMIN — LIDOCAINE HYDROCHLORIDE 0.5 ML: 10 INJECTION, SOLUTION INFILTRATION; PERINEURAL at 07:42

## 2020-08-10 RX ADMIN — Medication 1 AMPULE: at 21:03

## 2020-08-10 RX ADMIN — Medication 5 MG: at 09:26

## 2020-08-10 RX ADMIN — SODIUM CHLORIDE, SODIUM LACTATE, POTASSIUM CHLORIDE, AND CALCIUM CHLORIDE: 600; 310; 30; 20 INJECTION, SOLUTION INTRAVENOUS at 07:36

## 2020-08-10 RX ADMIN — AMIODARONE HYDROCHLORIDE 200 MG: 200 TABLET ORAL at 21:46

## 2020-08-10 RX ADMIN — CEFAZOLIN SODIUM 2 G: 100 INJECTION, POWDER, LYOPHILIZED, FOR SOLUTION INTRAVENOUS at 20:50

## 2020-08-10 RX ADMIN — PHENYLEPHRINE HYDROCHLORIDE 60 MCG: 10 INJECTION INTRAVENOUS at 10:27

## 2020-08-10 RX ADMIN — POTASSIUM CHLORIDE, DEXTROSE MONOHYDRATE AND SODIUM CHLORIDE 25 ML/HR: 150; 5; 450 INJECTION, SOLUTION INTRAVENOUS at 13:15

## 2020-08-10 RX ADMIN — HEPARIN SODIUM 33000 UNITS: 1000 INJECTION, SOLUTION INTRAVENOUS; SUBCUTANEOUS at 10:00

## 2020-08-10 RX ADMIN — FENTANYL CITRATE 50 MCG: 50 INJECTION INTRAMUSCULAR; INTRAVENOUS at 11:33

## 2020-08-10 NOTE — PERIOP NOTES
.. TRANSFER - OUT REPORT:    Verbal report given to JULIANN Newell on Swapna Police  being transferred to [facility] for routine progression of care       Report consisted of patients Situation, Background, Assessment and   Recommendations(SBAR). Information from the following report(s) OR Summary was reviewed with the receiving nurse. Lines:   Double Lumen 08/10/20 Right Internal jugular (Active)       Khushbu Amaral Dual 08/10/20 Right Neck (Active)       Peripheral IV 08/10/20 Right Hand (Active)   Phlebitis Assessment 0 08/10/20 0555   Dressing Status Clean, dry, & intact 08/10/20 0555   Dressing Type Transparent;Tape 08/10/20 0555   Hub Color/Line Status Pink; Infusing 08/10/20 0555       Arterial Line 08/10/20 Left Radial artery (Active)        Opportunity for questions and clarification was provided.       Patient transported with:   Monitor  O2 @ 15 liters  Patient-specific medications from Pharmacy  Registered Nurse

## 2020-08-10 NOTE — ANESTHESIA PROCEDURE NOTES
VITALIY  Date/Time: 8/10/2020 8:42 AM  Ordering Provider: Eula Goode MD    Procedure Details: probe placement, image aquisition & interpretation    Timeout performed, 08:42  Risks and benefits discussed with the patient and plans are to proceed    Procedure Note    Performed by: Gauri Lopez MD  Authorized by: Gauri Lopez MD       Indications: assessment of ascending aorta  Modalities: CF  Probe Type: multiplane  Insertion: atraumatic  Patient Status: intubated and sedated    Echocardiographic and Doppler Measurements   Aorta  Size  Diam(cm)  Dissection PlaqueThick(mm)  Plaque Mobile    Ascending normal  No >3 No    Arch         Descending normal  No >3 No          Valves  Annulus  Stenosis  Area/Grad  Regurg  Leaflet   Morph  Leaflet   Motion    Aortic normal none  0 normal normal    Mitral normal none  2+ normal normal    Tricuspid normal none              Atria  Size  SEC (smoke)  Thrombus  Tumor  Device    Rt Atrium         Lt Atrium          Interatrial Septum Morphology: normal    Interventricular Septum Morphology: normal    Ventricle  Cavity Size  Cavity Dimension Hypertrophy  Thrombus  Gloal FXN  EF    RV normal  No no normal     LV normal   No normal          Post Intervention Follow-up Study  Ventricular Global Function: improved       Valve  Function  Regurgitation  Area    Aortic       Mitral no change      Tricuspid       Prosthetic        Complications: None

## 2020-08-10 NOTE — PROGRESS NOTES
Verbal bedside report received from Zayra Bass, [facility]. Assumed care of patient. Insulin IV drip verified at bedside with outgoing RN. Patient on Pressure support on vent at this time for night shift RT to evaluate for readiness to extubate.

## 2020-08-10 NOTE — PROGRESS NOTES
08/10/20 1225   Patient Observations   Pulse (Heart Rate) 75   Resp Rate 18   O2 Sat (%) 100 %   Airway - Endotracheal Tube 08/10/20 Oral   Placement Date/Time: 08/10/20 0806   Number of Attempts: 2  Inserted By: ronni morgan  Location: Oral  Placement Verified: BBS;EtCO2; Auscultation  Airway Types: Endotracheal, cuffed  Airway Tube Size: 8 mm  DL Glottic View: 2  Technique: Direct Laryn. ..    Insertion Depth (cm) 24 cm   Line Marco Lips   Side Secured Right   Cuff Pressure 32 cmH20   Respiratory   Respiratory (WDL) X   Ventilator Initiate/Discontinue   Ventilator Initiate Yes   Bio-Med ID #   (pre use done)   Vent Settings   FIO2 (%) 60 %   SpO2/FIO2 Ratio 166.67   CMV Rate Set 18   Vt Set (ml) 550 ml   PEEP/VENT (cm H2O) 8 cm H20   Insp Time (sec) 0.95 sec   Insp Rise Time (sec) 5   Flow Trigger 2   Ventilator Measurements   Resp Rate Observed 18   Vt Exhaled (Machine Breath) (ml) 548 ml   Ve Observed (l/min) 9.7 l/min   PIP Observed (cm H2O) 18 cm H2O   Plateau Pressure (cm H2O) 15 cm H2O   MAP (cm H2O) 11   Auto PEEP Observed (cm H2O) 0 cm H2O   Safety & Alarms   Pressure Max 50 cm H2O   Pressure Min 2 cm H2O   Ve Min 2   Ve Max 22   RR Min 5   RR Max 50   Ambu Bag Yes   Ambu Mask Yes   Vent Method/Mode   Ventilation Method Conventional   Ventilator Mode PRVC   $$ Ventilator Initial Initial Vent Day

## 2020-08-10 NOTE — CONSULTS
Cardiovascular ICU Consult Note: 8/10/2020  Clarita Meneses  Admission Date: 8/10/2020     The patient's chart is reviewed and the patient is discussed with the staff. Subjective: Danika is an [de-identified] y/o male with hx of PIETER and recent abnormal stress test done because of chest pain. 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 Patient is seen at the request of Dr. Lizzy Montez for respiratory management status post cardiac surgery. Patient had CABG x 2. Currently is sedated in CV-ICU and orally intubated receiving  mechanical ventilation. We have been asked to see in the CV-ICU for mechanical ventilation management and weaning. Prior to Admission Medications   Prescriptions Last Dose Informant Patient Reported? Taking?   amiodarone (CORDARONE) 200 mg tablet 8/9/2020 at 1700  Yes Yes   Sig: Take 600 mg by mouth once. Take 3- 200 mg tabs for a total dose of 600 mg after 4 pm evening before procedure   aspirin 81 mg chewable tablet 8/10/2020 at 0300  Yes Yes   Sig: Take 81 mg by mouth daily. Take morning of procedure. betamethasone dipropionate (DIPROLENE) 0.05 % ointment Unknown at Unknown time  No No   Sig: Apply  to affected area two (2) times a day. Pull back foreskin with each application. cholecalciferol (Vitamin D3) 25 mcg (1,000 unit) cap Unknown at Unknown time  Yes No   Sig: Take  by mouth daily. cyanocobalamin (VITAMIN B12) 1,000 mcg/mL injection Unknown at Unknown time  No No   Sig: Use once weekly for 4 weeks IM, then monthly   metoprolol tartrate (LOPRESSOR) 25 mg tablet 8/9/2020 at 1700  Yes Yes   Sig: Take 12.5 mg by mouth once. Take 1/2 tablet (12.5 mg) after 4 pm evening before surgery. solifenacin (VESICARE) 5 mg tablet 8/10/2020 at 0300  No Yes   Sig: Take 1 Tab by mouth daily. Patient taking differently: Take 5 mg by mouth daily. Take morning of procedure.       Facility-Administered Medications: None       Review of Systems  Review of systems not obtained due to patient factors.     Past Medical History:   Diagnosis Date    Arthritis     OA generalized    Buerger's disease ([facility] [address]     left leg blood clot ~    CAD (coronary artery disease)     Decreased mobility     Gout     Hematuria, microscopic     Hiatal hernia     Personal history of prostate cancer     Prostate cancer ([facility] [address] ~    radiation treatment    Sleep apnea      Past Surgical History:   Procedure Laterality Date    APPENDECTOMY      HX CATARACT REMOVAL Bilateral     IOL    HX COLONOSCOPY      poplyp removed    HX CYST REMOVAL Left     shoulder    HX HEART CATHETERIZATION  2020    HX OTHER SURGICAL      prostate radiation    HX TURP       Social History     Socioeconomic History    Marital status:      Spouse name: Not on file    Number of children: Not on file    Years of education: Not on file    Highest education level: Not on file   Occupational History    Not on file   Social Needs    Financial resource strain: Not on file    Food insecurity     Worry: Not on file     Inability: Not on file    Transportation needs     Medical: Not on file     Non-medical: Not on file   Tobacco Use    Smoking status: Former Smoker     Last attempt to quit: 1960     Years since quittin.6    Smokeless tobacco: Never Used   Substance and Sexual Activity    Alcohol use: No    Drug use: Never    Sexual activity: Yes   Lifestyle    Physical activity     Days per week: Not on file     Minutes per session: Not on file    Stress: Not on file   Relationships    Social connections     Talks on phone: Not on file     Gets together: Not on file     Attends Jainism service: Not on file     Active member of club or organization: Not on file     Attends meetings of clubs or organizations: Not on file     Relationship status: Not on file    Intimate partner violence     Fear of current or ex partner: Not on file     Emotionally abused: Not on file     Physically abused: Not on file Forced sexual activity: Not on file   Other Topics Concern    Not on file   Social History Narrative    Drinks 1 cup of caffeine per day     Family History   Problem Relation Age of Onset    Cancer Father     Stroke Father     Heart Disease Father     Stroke Mother     Heart Disease Mother     Pulmonary Embolism Brother      Allergies   Allergen Reactions    Adhesive Rash    Tamsulosin Vertigo       Current Facility-Administered Medications   Medication Dose Route Frequency    alcohol 62% (NOZIN) nasal  1 Ampule  1 Ampule Topical Q12H    0.9% sodium chloride infusion  25 mL/hr IntraVENous CONTINUOUS    dextrose 5% - 0.45% NaCl with KCl 20 mEq/L infusion  25 mL/hr IntraVENous CONTINUOUS    sodium chloride (NS) flush 5-40 mL  5-40 mL IntraVENous Q8H    sodium chloride (NS) flush 5-40 mL  5-40 mL IntraVENous PRN    oxyCODONE-acetaminophen (PERCOCET) 5-325 mg per tablet 1 Tab  1 Tab Oral Q4H PRN    morphine 10 mg/ml injection 3-5 mg  3-5 mg IntraVENous Q1H PRN    naloxone (NARCAN) injection 0.4 mg  0.4 mg IntraVENous PRN    mupirocin (BACTROBAN) 2 % ointment   Both Nostrils BID    ceFAZolin (ANCEF) 2 g/20 mL in sterile water IV syringe  2 g IntraVENous Q8H    sodium bicarbonate (8.4%) injection 50 mEq  50 mEq IntraVENous PRN    EPINEPHrine (ADRENALIN) 4 mg in 0.9% sodium chloride 250 mL infusion  0.01-0.5 mcg/kg/min IntraVENous TITRATE    nitroglycerin (Tridil) 200 mcg/ml infusion   mcg/min IntraVENous TITRATE    PHENYLephrine (FRANCISCO-SYNEPHRINE) 30 mg in 0.9% sodium chloride 250 mL infusion   mcg/min IntraVENous TITRATE    lidocaine (PF) (XYLOCAINE) 100 mg/5 mL (2 %) injection syringe  mg   mg IntraVENous ONCE PRN    niCARdipine in Saline (CARDENE) 25 MG/250 mL infusion kit  5-15 mg/hr IntraVENous TITRATE    amiodarone (CORDARONE) tablet 200 mg  200 mg Oral Q12H    [START ON 8/11/2020] metoprolol tartrate (LOPRESSOR) tablet 25 mg  25 mg Oral Q12H    atorvastatin (LIPITOR) tablet 80 mg  80 mg Oral QHS    insulin regular (NOVOLIN R, HUMULIN R) 100 Units in 0.9% sodium chloride 100 mL infusion  1 Units/hr IntraVENous TITRATE    dextrose (D50W) injection syrg 12.5 g  25 mL IntraVENous PRN    [START ON 8/11/2020] aspirin chewable tablet 81 mg  81 mg Oral DAILY    magnesium sulfate 1 g/100 ml IVPB (premix or compounded)  1 g IntraVENous PRN    potassium chloride 10 mEq in 50 ml IVPB  10 mEq IntraVENous PRN    midazolam (VERSED) injection 1 mg  1 mg IntraVENous Q1H PRN    propofol (DIPRIVAN) 10 mg/mL infusion  0-50 mcg/kg/min IntraVENous TITRATE    meperidine (DEMEROL) injection 25 mg  25 mg IntraVENous Q1H PRN    chlorhexidine (PERIDEX) 0.12 % mouthwash 10 mL  10 mL Oral BID    tuberculin injection 5 Units  5 Units IntraDERMal ONCE         Objective:     Vitals:    08/10/20 1232 08/10/20 1237 08/10/20 1238 08/10/20 1245   BP:  130/45     Pulse:  77 76 77   Resp:  19 22 (!) 33   Temp:  (!) 94.2 °F (34.6 °C) (!) 94.4 °F (34.7 °C) (!) 96.2 °F (35.7 °C)   SpO2: 100% 100% 100% 100%   Weight:           Intake and Output:   No intake/output data recorded. 08/10 0701 - 08/10 1900  In: 2000 [I.V.:1450]  Out: 26 [Urine:455]    Physical Exam:          Constitutional:  Sedated, orally intubated and mechanically ventilated. EENMT:  Sclera clear, pupils equal, oral mucosa moist and orally intubated  Respiratory: clear  Cardiovascular:  RRR with no M,G,R;  Gastrointestinal:  soft; no bowel sounds present  Musculoskeletal:  warm with no cyanosis, no lower extremity edema. [place] site R leg with ace wrap. Sedated with no movements. SKIN:  no jaundice or ecchymosis   Neurologic:  sedated but no gross neuro deficits  Psychiatric:  sedated and unable to assess at this time    CXR:        LINES:  ETT, arroyo, swan nasra, arterial line, chest tubes times 2 in epigastric area without air leak.     DRIPS:  Ntg, kendell    CI:  3.0    Ventilator Settings  Mode FIO2 Rate Tidal Volume Pressure PEEP   PRVC  35 %    550 ml     8 cm H20      Peak airway pressure: 18 cm H2O   Minute ventilation: 9.7 l/min     ABG:   Recent Labs     08/10/20  1243 08/10/20  1147 08/10/20  1044   PHI 7.37 7.43 7.42   PCO2I 33.3* 36.0 36.3   PO2I 115* 379* 297*   HCO3I 19.4* 23.6 23.7        LAB  No results for input(s): WBC, HGB, HCT, PLT, INR, HGBEXT, HCTEXT, PLTEXT, INREXT, HGBEXT, HCTEXT, PLTEXT, INREXT in the last 72 hours. No results for input(s): NA, K, CL, CO2, GLU, BUN, CREA, MG, PHOS, CA, ALB, TBIL, BNPP in the last 72 hours. No lab exists for component: TROIP, GPT, SGOT  No results for input(s): LCAD, LAC in the last 72 hours. Assessment and Plan :  (Medical Decision Making)     Hospital Problems  Date Reviewed: 8/10/2020          Codes Class Noted POA    CAD (coronary artery disease) ICD-10-CM: I25.10  ICD-9-CM: 414.00  8/10/2020 Unknown        Atherosclerosis of native coronary artery of native heart with unstable angina pectoris ([facility] [address] ICD-10-CM: I25.110  ICD-9-CM: 414.01, 411.1  8/10/2020 Unknown        Encounter for weaning from ventilator [facility]) ICD-10-CM: Z99.11  ICD-9-CM: V46.13  8/10/2020 Unknown        Hypoxemia ICD-10-CM: R09.02  ICD-9-CM: 799.02  8/10/2020 Unknown        S/P CABG x 2 ICD-10-CM: Z95.1  ICD-9-CM: V45.81  8/10/2020 Unknown              Plan:   --Wean mechanical ventilation per protocol. --Bronchodilators per protocol. --Incentive spirometry every hour post extubation. --Review CXR    More than 50% of the time documented was spent in face-to-face contact with the patient and in the care of the patient on the floor/unit where the patient is located. Thank you for this referral.  We appreciate the opportunity to participate in this patient's care. Will follow along with you.     Adelina Lambert MD

## 2020-08-10 NOTE — ANESTHESIA PROCEDURE NOTES
Central Line Placement    Start time: 8/10/2020 8:09 AM  End time: 8/10/2020 8:21 AM  Performed by: Adam Delgado MD  Authorized by: Adam Delgado MD     Indications: need for vasopressors, central pressure monitoring and vascular access  Preanesthetic Checklist: patient identified, risks and benefits discussed, anesthesia consent, site marked, patient being monitored and timeout performed    Timeout Time: 08:09       Pre-procedure: All elements of maximal sterile barrier technique followed? Yes    2% Chlorhexidine for cutaneous antisepsis, Hand hygiene performed prior to catheter insertion and Ultrasound guidance    Sterile Ultrasound Technique followed?: Yes        Ultrasound Image Stored? Image stored    Procedure:   Prep:  ChloraPrep  Location: internal jugular  Orientation:  Right  Patient position:  Trendelenburg  Number of lumens: sg introducer.   Catheter size:  8.5 Fr    Number of attempts:  1  Successful placement: Yes      Assessment:   Post-procedure:  Catheter secured and sterile dressing applied  Assessment:  Blood return through all ports, free fluid flow and guidewire removal verified  Insertion:  Uncomplicated  Patient tolerance:  Patient tolerated the procedure well with no immediate complications  All needles out intact, procedure tolerated well without problems

## 2020-08-10 NOTE — PROGRESS NOTES
Continued attempts on PS trials have all been terminated due to frequent periods of apnea. Patient remains alert and follows all commands with the exception of breathing at least once every 6 seconds.

## 2020-08-10 NOTE — BRIEF OP NOTE
Brief Postoperative Note    Patient: Clarita Meneses  YOB: 1940  MRN: [id number]    Date of Procedure: 8/10/2020     Pre-Op Diagnosis: Atherosclerosis of native coronary artery of native heart with unstable angina pectoris ([facility] [address] [I25.110]    Post-Op Diagnosis: Same as preoperative diagnosis. Procedure(s):  CORONARY ARTERY BYPASS GRAFT (CABG X2), LIMA  VEIN HARVEST, GREATER SAPHENOUS  ESOPHAGEAL TRANS ECHOCARDIOGRAM    Surgeon(s):   Darylene Senate, MD Wetzel Alken, MD    Surgical Assistant: None    Anesthesia: General     Estimated Blood Loss (mL): Minimal    Complications: None    Specimens: * No specimens in log *     Implants: * No implants in log *    Drains:   Orogastric Tube 08/10/20 (Active)   Site Assessment Clean, dry, & intact 08/10/20 1225   Securement Device Tape 08/10/20 1225   G Port Status Intermittent Suction 08/10/20 1225   External Insertion Marco (cms) 55 cms 08/10/20 1225   Action Taken Placement verified (comment) 08/10/20 1225       Findings: cad    Electronically Signed by Ghanshyam Davison MD on 8/10/2020 at 12:32 PM

## 2020-08-11 ENCOUNTER — APPOINTMENT (OUTPATIENT)
Dept: GENERAL RADIOLOGY | Age: 80
DRG: 236 | End: 2020-08-11
Attending: THORACIC SURGERY (CARDIOTHORACIC VASCULAR SURGERY)
Payer: MEDICARE

## 2020-08-11 PROBLEM — G47.31 CENTRAL SLEEP APNEA: Status: ACTIVE | Noted: 2020-08-11

## 2020-08-11 PROBLEM — Z99.11 ENCOUNTER FOR WEANING FROM VENTILATOR (HCC): Status: RESOLVED | Noted: 2020-08-10 | Resolved: 2020-08-11

## 2020-08-11 LAB
ANION GAP SERPL CALC-SCNC: 8 MMOL/L (ref 7–16)
ATRIAL RATE: 86 BPM
BUN SERPL-MCNC: 21 MG/DL (ref 8–23)
CALCIUM SERPL-MCNC: 8 MG/DL (ref 8.3–10.4)
CALCULATED P AXIS, ECG09: 50 DEGREES
CALCULATED R AXIS, ECG10: 30 DEGREES
CALCULATED T AXIS, ECG11: 81 DEGREES
CHLORIDE SERPL-SCNC: 114 MMOL/L (ref 98–107)
CO2 SERPL-SCNC: 22 MMOL/L (ref 21–32)
CREAT SERPL-MCNC: 1.1 MG/DL (ref 0.8–1.5)
DIAGNOSIS, 93000: NORMAL
ERYTHROCYTE [DISTWIDTH] IN BLOOD BY AUTOMATED COUNT: 13.3 % (ref 11.9–14.6)
GLUCOSE BLD STRIP.AUTO-MCNC: 104 MG/DL (ref 65–100)
GLUCOSE BLD STRIP.AUTO-MCNC: 107 MG/DL (ref 65–100)
GLUCOSE BLD STRIP.AUTO-MCNC: 109 MG/DL (ref 65–100)
GLUCOSE BLD STRIP.AUTO-MCNC: 111 MG/DL (ref 65–100)
GLUCOSE BLD STRIP.AUTO-MCNC: 115 MG/DL (ref 65–100)
GLUCOSE BLD STRIP.AUTO-MCNC: 118 MG/DL (ref 65–100)
GLUCOSE BLD STRIP.AUTO-MCNC: 136 MG/DL (ref 65–100)
GLUCOSE BLD STRIP.AUTO-MCNC: 141 MG/DL (ref 65–100)
GLUCOSE BLD STRIP.AUTO-MCNC: 99 MG/DL (ref 65–100)
GLUCOSE SERPL-MCNC: 94 MG/DL (ref 65–100)
HCT VFR BLD AUTO: 29.5 % (ref 41.1–50.3)
HGB BLD-MCNC: 9.6 G/DL (ref 13.6–17.2)
MAGNESIUM SERPL-MCNC: 2.5 MG/DL (ref 1.8–2.4)
MCH RBC QN AUTO: 30.3 PG (ref 26.1–32.9)
MCHC RBC AUTO-ENTMCNC: 32.5 G/DL (ref 31.4–35)
MCV RBC AUTO: 93.1 FL (ref 79.6–97.8)
MM INDURATION POC: 0 MM (ref 0–5)
NRBC # BLD: 0 K/UL (ref 0–0.2)
P-R INTERVAL, ECG05: 164 MS
PLATELET # BLD AUTO: 102 K/UL (ref 150–450)
PMV BLD AUTO: 12.5 FL (ref 9.4–12.3)
POTASSIUM SERPL-SCNC: 4.5 MMOL/L (ref 3.5–5.1)
PPD POC: NEGATIVE NEGATIVE
Q-T INTERVAL, ECG07: 386 MS
QRS DURATION, ECG06: 110 MS
QTC CALCULATION (BEZET), ECG08: 461 MS
RBC # BLD AUTO: 3.17 M/UL (ref 4.23–5.6)
SODIUM SERPL-SCNC: 144 MMOL/L (ref 136–145)
VENTRICULAR RATE, ECG03: 86 BPM
WBC # BLD AUTO: 10.3 K/UL (ref 4.3–11.1)

## 2020-08-11 PROCEDURE — 97530 THERAPEUTIC ACTIVITIES: CPT

## 2020-08-11 PROCEDURE — 74011250637 HC RX REV CODE- 250/637: Performed by: THORACIC SURGERY (CARDIOTHORACIC VASCULAR SURGERY)

## 2020-08-11 PROCEDURE — 36600 WITHDRAWAL OF ARTERIAL BLOOD: CPT

## 2020-08-11 PROCEDURE — 77030040393 HC DRSG OPTIFOAM GENT MDII -B

## 2020-08-11 PROCEDURE — 97110 THERAPEUTIC EXERCISES: CPT

## 2020-08-11 PROCEDURE — 74011250636 HC RX REV CODE- 250/636: Performed by: THORACIC SURGERY (CARDIOTHORACIC VASCULAR SURGERY)

## 2020-08-11 PROCEDURE — 99232 SBSQ HOSP IP/OBS MODERATE 35: CPT | Performed by: INTERNAL MEDICINE

## 2020-08-11 PROCEDURE — 77030012890

## 2020-08-11 PROCEDURE — 77010033678 HC OXYGEN DAILY

## 2020-08-11 PROCEDURE — 82962 GLUCOSE BLOOD TEST: CPT

## 2020-08-11 PROCEDURE — 74011250637 HC RX REV CODE- 250/637: Performed by: PHYSICIAN ASSISTANT

## 2020-08-11 PROCEDURE — 97162 PT EVAL MOD COMPLEX 30 MIN: CPT

## 2020-08-11 PROCEDURE — 65660000004 HC RM CVT STEPDOWN

## 2020-08-11 PROCEDURE — 71045 X-RAY EXAM CHEST 1 VIEW: CPT

## 2020-08-11 PROCEDURE — 85027 COMPLETE CBC AUTOMATED: CPT

## 2020-08-11 PROCEDURE — 74011000250 HC RX REV CODE- 250: Performed by: THORACIC SURGERY (CARDIOTHORACIC VASCULAR SURGERY)

## 2020-08-11 PROCEDURE — 83735 ASSAY OF MAGNESIUM: CPT

## 2020-08-11 PROCEDURE — 80048 BASIC METABOLIC PNL TOTAL CA: CPT

## 2020-08-11 PROCEDURE — 93005 ELECTROCARDIOGRAM TRACING: CPT | Performed by: THORACIC SURGERY (CARDIOTHORACIC VASCULAR SURGERY)

## 2020-08-11 RX ORDER — ONDANSETRON 2 MG/ML
4 INJECTION INTRAMUSCULAR; INTRAVENOUS
Status: DISCONTINUED | OUTPATIENT
Start: 2020-08-11 | End: 2020-08-18 | Stop reason: HOSPADM

## 2020-08-11 RX ORDER — TRAMADOL HYDROCHLORIDE 50 MG/1
50 TABLET ORAL
Status: DISCONTINUED | OUTPATIENT
Start: 2020-08-11 | End: 2020-08-18 | Stop reason: HOSPADM

## 2020-08-11 RX ORDER — MUPIROCIN 20 MG/G
OINTMENT TOPICAL 2 TIMES DAILY
Status: DISCONTINUED | OUTPATIENT
Start: 2020-08-11 | End: 2020-08-11

## 2020-08-11 RX ORDER — MAG HYDROX/ALUMINUM HYD/SIMETH 200-200-20
30 SUSPENSION, ORAL (FINAL DOSE FORM) ORAL
Status: DISCONTINUED | OUTPATIENT
Start: 2020-08-11 | End: 2020-08-18 | Stop reason: HOSPADM

## 2020-08-11 RX ORDER — SODIUM CHLORIDE 0.9 % (FLUSH) 0.9 %
5-40 SYRINGE (ML) INJECTION EVERY 8 HOURS
Status: DISCONTINUED | OUTPATIENT
Start: 2020-08-11 | End: 2020-08-16 | Stop reason: SDUPTHER

## 2020-08-11 RX ORDER — LANOLIN ALCOHOL/MO/W.PET/CERES
400 CREAM (GRAM) TOPICAL
Status: DISCONTINUED | OUTPATIENT
Start: 2020-08-11 | End: 2020-08-18 | Stop reason: HOSPADM

## 2020-08-11 RX ORDER — AMIODARONE HYDROCHLORIDE 200 MG/1
200 TABLET ORAL EVERY 12 HOURS
Status: DISCONTINUED | OUTPATIENT
Start: 2020-08-11 | End: 2020-08-14

## 2020-08-11 RX ORDER — FUROSEMIDE 40 MG/1
40 TABLET ORAL DAILY
Status: DISPENSED | OUTPATIENT
Start: 2020-08-12 | End: 2020-08-15

## 2020-08-11 RX ORDER — ASPIRIN 81 MG/1
81 TABLET ORAL DAILY
Status: DISCONTINUED | OUTPATIENT
Start: 2020-08-12 | End: 2020-08-18 | Stop reason: HOSPADM

## 2020-08-11 RX ORDER — ATORVASTATIN CALCIUM 80 MG/1
80 TABLET, FILM COATED ORAL
Status: DISCONTINUED | OUTPATIENT
Start: 2020-08-11 | End: 2020-08-12

## 2020-08-11 RX ORDER — FAMOTIDINE 20 MG/1
20 TABLET, FILM COATED ORAL 2 TIMES DAILY
Status: DISCONTINUED | OUTPATIENT
Start: 2020-08-11 | End: 2020-08-11

## 2020-08-11 RX ORDER — POTASSIUM CHLORIDE 750 MG/1
10 TABLET, EXTENDED RELEASE ORAL DAILY
Status: DISPENSED | OUTPATIENT
Start: 2020-08-12 | End: 2020-08-15

## 2020-08-11 RX ORDER — ACETAMINOPHEN 325 MG/1
650 TABLET ORAL
Status: DISCONTINUED | OUTPATIENT
Start: 2020-08-11 | End: 2020-08-18 | Stop reason: HOSPADM

## 2020-08-11 RX ORDER — AMOXICILLIN 250 MG
2 CAPSULE ORAL 2 TIMES DAILY
Status: DISCONTINUED | OUTPATIENT
Start: 2020-08-11 | End: 2020-08-15

## 2020-08-11 RX ORDER — OXYCODONE AND ACETAMINOPHEN 5; 325 MG/1; MG/1
1 TABLET ORAL
Status: DISCONTINUED | OUTPATIENT
Start: 2020-08-11 | End: 2020-08-11 | Stop reason: SDUPTHER

## 2020-08-11 RX ORDER — POTASSIUM CHLORIDE 20 MEQ/1
40 TABLET, EXTENDED RELEASE ORAL
Status: DISCONTINUED | OUTPATIENT
Start: 2020-08-11 | End: 2020-08-18 | Stop reason: HOSPADM

## 2020-08-11 RX ORDER — METOPROLOL TARTRATE 25 MG/1
25 TABLET, FILM COATED ORAL EVERY 12 HOURS
Status: DISCONTINUED | OUTPATIENT
Start: 2020-08-11 | End: 2020-08-14

## 2020-08-11 RX ORDER — POTASSIUM CHLORIDE 20 MEQ/1
20 TABLET, EXTENDED RELEASE ORAL
Status: DISCONTINUED | OUTPATIENT
Start: 2020-08-11 | End: 2020-08-18 | Stop reason: HOSPADM

## 2020-08-11 RX ORDER — DOCUSATE SODIUM 100 MG/1
100 CAPSULE, LIQUID FILLED ORAL DAILY
Status: DISCONTINUED | OUTPATIENT
Start: 2020-08-12 | End: 2020-08-11

## 2020-08-11 RX ORDER — FAMOTIDINE 20 MG/1
20 TABLET, FILM COATED ORAL 2 TIMES DAILY
Status: DISCONTINUED | OUTPATIENT
Start: 2020-08-11 | End: 2020-08-12

## 2020-08-11 RX ORDER — TRAMADOL HYDROCHLORIDE 50 MG/1
50 TABLET ORAL
Status: DISCONTINUED | OUTPATIENT
Start: 2020-08-11 | End: 2020-08-11 | Stop reason: SDUPTHER

## 2020-08-11 RX ORDER — OXYCODONE AND ACETAMINOPHEN 5; 325 MG/1; MG/1
1 TABLET ORAL
Status: DISCONTINUED | OUTPATIENT
Start: 2020-08-11 | End: 2020-08-18 | Stop reason: HOSPADM

## 2020-08-11 RX ORDER — INSULIN LISPRO 100 [IU]/ML
INJECTION, SOLUTION INTRAVENOUS; SUBCUTANEOUS
Status: DISCONTINUED | OUTPATIENT
Start: 2020-08-11 | End: 2020-08-15

## 2020-08-11 RX ORDER — SODIUM CHLORIDE 0.9 % (FLUSH) 0.9 %
5-40 SYRINGE (ML) INJECTION AS NEEDED
Status: DISCONTINUED | OUTPATIENT
Start: 2020-08-11 | End: 2020-08-18 | Stop reason: HOSPADM

## 2020-08-11 RX ORDER — FUROSEMIDE 10 MG/ML
20 INJECTION INTRAMUSCULAR; INTRAVENOUS ONCE
Status: COMPLETED | OUTPATIENT
Start: 2020-08-11 | End: 2020-08-11

## 2020-08-11 RX ADMIN — AMIODARONE HYDROCHLORIDE 200 MG: 200 TABLET ORAL at 08:36

## 2020-08-11 RX ADMIN — AMIODARONE HYDROCHLORIDE 200 MG: 200 TABLET ORAL at 22:07

## 2020-08-11 RX ADMIN — Medication 10 ML: at 18:10

## 2020-08-11 RX ADMIN — FUROSEMIDE 20 MG: 10 INJECTION INTRAMUSCULAR; INTRAVENOUS at 08:36

## 2020-08-11 RX ADMIN — SENNOSIDES AND DOCUSATE SODIUM 2 TABLET: 8.6; 5 TABLET ORAL at 22:07

## 2020-08-11 RX ADMIN — Medication 10 ML: at 18:04

## 2020-08-11 RX ADMIN — ASPIRIN 81 MG: 81 TABLET, CHEWABLE ORAL at 08:36

## 2020-08-11 RX ADMIN — FAMOTIDINE 20 MG: 20 TABLET, FILM COATED ORAL at 17:56

## 2020-08-11 RX ADMIN — ONDANSETRON 4 MG: 2 INJECTION INTRAMUSCULAR; INTRAVENOUS at 02:49

## 2020-08-11 RX ADMIN — Medication 10 ML: at 07:13

## 2020-08-11 RX ADMIN — OXYCODONE HYDROCHLORIDE AND ACETAMINOPHEN 1 TABLET: 5; 325 TABLET ORAL at 02:54

## 2020-08-11 RX ADMIN — ACETAMINOPHEN 650 MG: 325 TABLET, FILM COATED ORAL at 17:56

## 2020-08-11 RX ADMIN — ATORVASTATIN CALCIUM 80 MG: 80 TABLET, FILM COATED ORAL at 22:07

## 2020-08-11 RX ADMIN — Medication 10 ML: at 22:50

## 2020-08-11 RX ADMIN — METOPROLOL TARTRATE 25 MG: 25 TABLET, FILM COATED ORAL at 22:07

## 2020-08-11 RX ADMIN — Medication 1 AMPULE: at 08:36

## 2020-08-11 RX ADMIN — CEFAZOLIN SODIUM 2 G: 100 INJECTION, POWDER, LYOPHILIZED, FOR SOLUTION INTRAVENOUS at 03:20

## 2020-08-11 NOTE — PROGRESS NOTES
Verbal bedside report given to [facility], oncoming RN. Patient's situation, background, assessment and recommendations provided. Opportunity for questions provided. Oncoming RN assumed care of patient.

## 2020-08-11 NOTE — PROGRESS NOTES
Initial visit made to patient and a prayer was provided for the patient and a family member.        VIV Joseph

## 2020-08-11 NOTE — PROGRESS NOTES
Problem: Mobility Impaired (Adult and Pediatric)  Goal: *Acute Goals and Plan of Care (Insert Text)  Outcome: Progressing Towards Goal  Note:   1. Mr. Sobeida Tucker will perform supine to sit and sit to supine with bed flat and no rails independently in 7 days. 2.  Mr. Sobeida Tucker will perform sit to stand and bed to chair with least restrictive device independently in 7 days. 3.  Mr. Sobeida Tucker will perform gait with least restrictive device >500 ft independently in 7 days. 4.  Mr. Sobeida Tucker will perform therex to bilateral lower extremities x 25 reps in sitting and standing in 7 days. PHYSICAL THERAPY: Daily Note and PM 8/11/2020  INPATIENT: PT Visit Days : 1  [place] / [place] / Product Type: Medicare /       NAME/AGE/GENDER: Mague Godoy is a [de-identified] y.o. male   PRIMARY DIAGNOSIS: Atherosclerosis of native coronary artery of native heart with unstable angina pectoris ([facility] [address] [I25.110]  CAD (coronary artery disease) [I25.10]  Atherosclerosis of native coronary artery of native heart with unstable angina pectoris ([facility] [address] [I25.110] <principal problem not specified> <principal problem not specified>  Procedure(s) (LRB):  CORONARY ARTERY BYPASS GRAFT (CABG X2), LIMA (N/A)  VEIN HARVEST, GREATER SAPHENOUS (Right)  ESOPHAGEAL TRANS ECHOCARDIOGRAM (N/A)  1 Day Post-Op  ICD-10: Treatment Diagnosis:    · Generalized Muscle Weakness (M62.81)  · Difficulty in walking, Not elsewhere classified (R26.2)   Precaution/Allergies:  Adhesive and Tamsulosin      ASSESSMENT:     Mr. Sobeida Tucker presents sitting up in the chair. Chest tube still in. O2. He is 6'4\". Lives with his wife and quite active prior to surgery. Still driving. He endorses a little pain in his chest but just a little soreness. Sit to stand with cg. Using heart pillow. Gait with ICU rollator 110 ft at a fairly good pace. No SOB noted. Stand to sit with cg.   Once in the chair was explaining to him that he would see PT 2 x a day while here in the hospital.   He responded he didn't want PT. \"Tasha had it before and it didn't help. \"  took some explaining to inform him that this kind of PT was for patients with CABG and it was an important part of the team to get him stronger to get out of the hospital.  He was frustrated about sitting around. Showed him exercises and had him perform them. Suggested he do these whenever he wanted to. By the end he actually thanked me. He is expected to go to the floor and get his chest tube out today. Mr. Frankie Duran is functioning below baseline and is therefore appropriate for skilled PT to maximize his rehab potential.    Pm note: Pt in step down this afternoon, sitting in chair on arrival, family present. Pt on 2 L/min O2, states no pain presently at rest. Pt required CGA to MIN A for scooting to edge of chair, MOD A for sit to stand. Pt with fair to good static standing balance using urinal. Pt ambulated with RW into hallway for 150' with CGA, accelerated ray, 2 L O2. Pt with cues for posture, walker safety. Pt returned to room, seated in chair and performed seated there ex with SBA. Pt overall doing well this afternoon. Pt states independnet at baseline, may want to try to ambulate without assistive device tomorrow pending balance needs. PT to cont to follow for acute care needs, anticipate home with HHPT. This section established at most recent assessment   PROBLEM LIST (Impairments causing functional limitations):  1. Decreased Strength  2. Decreased Transfer Abilities  3. Decreased Ambulation Ability/Technique  4. Decreased Activity Tolerance   INTERVENTIONS PLANNED: (Benefits and precautions of physical therapy have been discussed with the patient.)  1. Bed Mobility  2. Gait Training  3. Therapeutic Activites  4. Therapeutic Exercise/Strengthening  5.  Transfer Training     TREATMENT PLAN: Frequency/Duration: twice daily for duration of hospital stay  Rehabilitation Potential For Stated Goals: Good     REHAB RECOMMENDATIONS (at time of discharge pending progress):    Placement: It is my opinion, based on this patient's performance to date, that Mr. Geoff Stark may benefit from [address] after discharge due to the functional deficits listed above that are likely to improve with skilled rehabilitation because he/she has multiple medical issues that affect his/her functional mobility in the community. Equipment:    To be determined. HISTORY:   History of Present Injury/Illness (Reason for Referral):  As above. Past Medical History/Comorbidities:   Mr. Geoff Stark  has a past medical history of Arthritis, Buerger's disease ([facility] [address] CAD (coronary artery disease), Decreased mobility, Gout, Hematuria, microscopic, Hiatal hernia, Personal history of prostate cancer, Prostate cancer ([facility] [address] (~2000), and Sleep apnea. He also has no past medical history of Difficult intubation, Malignant hyperthermia due to anesthesia, Nausea & vomiting, or Pseudocholinesterase deficiency. Mr. Geoff Stark  has a past surgical history that includes pr appendectomy; hx other surgical; hx cyst removal (Left); hx heart catheterization (08/03/2020); hx colonoscopy; hx turp; and hx cataract removal (Bilateral). Social History/Living Environment:   Home Environment: Private residence  # Steps to Enter: 0  One/Two Story Residence: One story  Living Alone: No  Patient Expects to be Discharged to[de-identified] Private residence  Current DME Used/Available at Home: jose [employer]inton(doesnt use it.)  Tub or Shower Type: Shower  Prior Level of Function/Work/Activity:  independent  Age,    Number of Personal Factors/Comorbidities that affect the Plan of Care: 1-2: MODERATE COMPLEXITY   EXAMINATION:   Most Recent Physical Functioning:   Gross Assessment:                  Posture:  Posture Assessment:  Forward head, Rounded shoulders  Balance:  Sitting: Intact  Standing: Impaired  Standing - Static: Good;Fair  Standing - Dynamic : Fair(fair +) Bed Mobility:  Supine to Sit: (up in chair)  Sit to Supine: (left up in chair)  Scooting: Minimum assistance(to edge of chair)  Wheelchair Mobility:     Transfers:  Sit to Stand: Contact guard assistance  Stand to Sit: Contact guard assistance  Gait:     Base of Support: Widened  Speed/Alesia: Slow  Step Length: Left shortened;Right shortened  Swing Pattern: Left symmetrical;Right symmetrical  Gait Abnormalities: Antalgic;Decreased step clearance  Distance (ft): 150 Feet (ft)  Assistive Device: Gait belt;Walker, rolling  Ambulation - Level of Assistance: Contact guard assistance  Interventions: Verbal cues      Body Structures Involved:  1. Muscles Body Functions Affected:  1. Movement Related Activities and Participation Affected:  1. Mobility   Number of elements that affect the Plan of Care: 3: MODERATE COMPLEXITY   CLINICAL PRESENTATION:   Presentation: Evolving clinical presentation with changing clinical characteristics: MODERATE COMPLEXITY   CLINICAL DECISION MAKIN [facility] Inpatient Short Form  How much difficulty does the patient currently have. .. Unable A Lot A Little None   1. Turning over in bed (including adjusting bedclothes, sheets and blankets)? [] 1   [] 2   [x] 3   [] 4   2. Sitting down on and standing up from a chair with arms ( e.g., wheelchair, bedside commode, etc.)   [] 1   [] 2   [x] 3   [] 4   3. Moving from lying on back to sitting on the side of the bed? [] 1   [] 2   [x] 3   [] 4   How much help from another person does the patient currently need. .. Total A Lot A Little None   4. Moving to and from a bed to a chair (including a wheelchair)? [] 1   [] 2   [x] 3   [] 4   5. Need to walk in hospital room? [] 1   [] 2   [x] 3   [] 4   6. Climbing 3-5 steps with a railing? [] 1   [] 2   [x] 3   [] 4   © , Trustees of [address] [place] [address] [address] [address], under license to [employer].  All rights reserved      Score:  Initial: 18 Most Recent: X (Date: -- )    Interpretation of Tool:  Represents activities that are increasingly more difficult (i.e. Bed mobility, Transfers, Gait). Medical Necessity:     · Patient is expected to demonstrate progress in   · functional technique  ·  to   · increase independence with mobility and gait. · .  Reason for Services/Other Comments:  · Patient   · continues to require present interventions due to patient's inability to function at baseline. · .   Use of outcome tool(s) and clinical judgement create a POC that gives a: Questionable prediction of patient's progress: MODERATE COMPLEXITY            TREATMENT:   (In addition to Assessment/Re-Assessment sessions the following treatments were rendered)   Pre-treatment Symptoms/Complaints: \"How long do it stay up\"  Pain: Initial:   Pain Intensity 1: 0  Post Session:  0/10     Therapeutic Activity: (    26 min): Therapeutic activities including Chair transfers, Ambulation on level ground and walker safety, posture, there ex in sitting (noted in chart below) to improve mobility, strength, balance and coordination. Required minimal Verbal cues to promote static and dynamic balance in standing and promote coordination of bilateral, lower extremity(s). Date:  8/11/20 Date:   Date:     ACTIVITY/EXERCISE AM PM AM PM AM PM   Seated LAQ  X 10 B A       Seated AP  X 10 B A       Seated marching  X 10 B A                                               Braces/Orthotics/Lines/Etc:   · O2 Device: Nasal cannula  Treatment/Session Assessment:    · Response to Treatment:  Tolerated well   · Interdisciplinary Collaboration:   o Physical Therapist  o Registered Nurse  · After treatment position/precautions:   o Up in chair  o Bed/Chair-wheels locked  o Call light within reach  o RN notified  o Family at bedside   · Compliance with Program/Exercises: Will assess as treatment progresses  · Recommendations/Intent for next treatment session:   \"Next visit will focus on advancements to more challenging activities and reduction in assistance provided\".   Total Treatment Duration:  PT Patient Time In/Time Out  Time In: 1518  Time Out: [facility],

## 2020-08-11 NOTE — PROGRESS NOTES
POD 1 Day Post-Op    Procedure:  Procedure(s):  CORONARY ARTERY BYPASS GRAFT (CABG X2), LIMA  VEIN HARVEST, GREATER SAPHENOUS  ESOPHAGEAL TRANS ECHOCARDIOGRAM      Subjective:     Patient has No significant medical complaints      Objective:     Patient Vitals for the past 8 hrs:   BP Temp Pulse Resp SpO2 Weight   08/11/20 0700 103/54 97.8 °F (36.6 °C) 79 15 99 %    08/11/20 0645 106/58  84 13 (!) 89 %    08/11/20 0630   90  97 %    08/11/20 0615   81 17 99 %    08/11/20 0600 106/58  75 17 99 %    08/11/20 0545 100/55  85 16 96 %    08/11/20 0530   78 23 100 %    08/11/20 0516 104/57  78 16 100 %    08/11/20 0515   79 21 100 %    08/11/20 0500 105/56  78 13 100 %    08/11/20 0445 101/59  70 8 100 %    08/11/20 0431 104/57  81 15 100 %    08/11/20 0430   81 18 100 %    08/11/20 0415 108/58 98.2 °F (36.8 °C) 84 15 100 % 212 lb 4.9 oz (96.3 kg)   08/11/20 0410 104/58 98.2 °F (36.8 °C) 80 13 100 %    08/11/20 0400 99/58 98.2 °F (36.8 °C) (!) 154 16 100 %    08/11/20 0345 93/55 98.2 °F (36.8 °C) 81 23 97 %    08/11/20 0330  98.2 °F (36.8 °C) 75 13 97 %    08/11/20 0315 94/57 98.2 °F (36.8 °C) 75 14 96 %    08/11/20 0300 98/58 98.2 °F (36.8 °C) 76 23 96 %    08/11/20 0245 95/53 98.2 °F (36.8 °C) 68 21 97 %    08/11/20 0230  98.2 °F (36.8 °C) 67 14 96 %    08/11/20 0215 94/57 98.2 °F (36.8 °C) 75 21 95 %    08/11/20 0200 (!) 86/54 98 °F (36.7 °C) 79 24 95 %    08/11/20 0145 (!) 85/52 98 °F (36.7 °C) 77 11 95 %    08/11/20 0131 96/51 98 °F (36.7 °C) 81 8 98 %    08/11/20 0130  98 °F (36.7 °C) 79 14 99 %    08/11/20 0115 92/55 98 °F (36.7 °C) 78 14 99 %    08/11/20 0104 91/50 97.8 °F (36.6 °C) 79 10 99 %    08/11/20 0100  97.8 °F (36.6 °C) 76 12 99 %    08/11/20 0045  97.8 °F (36.6 °C) 77 11 99 %    08/11/20 0030  97.8 °F (36.6 °C) 75 12 99 %    08/11/20 0015  97.7 °F (36.5 °C) 77 12 99 %    08/11/20 0000  97.5 °F (36.4 °C) 75 12 99 %    08/10/20 2345  97.3 °F (36.3 °C) 78 13 99 %    08/10/20 2330  97.1 °F (36.2 °C) 77 13 99 %      Temp (24hrs), Av.2 °F (35.7 °C), Min:94.2 °F (34.6 °C), Max:98.2 °F (36.8 °C)        Hemodynamics    PAP  CO (l/min): 7.3 l/min CO  CI (l/min/m2): 3.3 l/min/m2 CI    No intake/output data recorded.    1901 -  0700  In: 3023.9 [I.V.:2473.9]  Out: 5228 [Urine:1945]    CT Drainage              total of all CT's    Heart:  regular rate and rhythm, S1, S2 normal, no murmur, click, rub or gallop  Lung:  clear to auscultation bilaterally  Neuro: Grossly non focal  Incisions: Clean, dry, and intact    Labs:  Recent Results (from the past 24 hour(s))   POC CG8I    Collection Time: 08/10/20  8:38 AM   Result Value Ref Range    pH (POC) 7.43 7.35 - 7.45      pCO2 (POC) 38.8 35 - 45 MMHG    pO2 (POC) 182 (H) 75 - 100 MMHG    HCO3 (POC) 25.6 22 - 26 MMOL/L    sO2 (POC) 100 (H) 95 - 98 %    Base excess (POC) 1 mmol/L    Sodium,  136 - 145 MMOL/L    Potassium, POC 3.8 3.5 - 5.1 MMOL/L    Glucose, POC 99 65 - 100 MG/DL    Calcium, ionized (POC) 1.24 1.12 - 1.32 mmol/L   TEG GLOBAL HEMOSTASIS    Collection Time: 08/10/20  8:47 AM   Result Value Ref Range    Citrated Kaolin R-Time 4.2 (L) 4.6 - 9.1 MINS    Citrated Kaolin K-Time 0.9 0.8 - 2.1 MINS    Citrated Kaolin Angle 76.7 63 - 78 deg    Citrated Kaolin Maximum Amplitude 64 52 - 69 mm    Citrated RapidTEG Maximum Amplitude 64.5 52 - 70 mm    Citrated Kaolin/Heparinase R-Time 4.4 4.3 - 8.3 MINS    Citrated Functional Fibrinogen Maximum Amplitude 28 15 - 32 mm    Functional Fibrinogen Level 510.9 278 - 581 mg/dL   POC CG8I    Collection Time: 08/10/20 10:09 AM   Result Value Ref Range    pH (POC) 7.40 7.35 - 7.45      pCO2 (POC) 39.5 35 - 45 MMHG    pO2 (POC) 184 (H) 75 - 100 MMHG    HCO3 (POC) 24.4 22 - 26 MMOL/L    sO2 (POC) 100 (H) 95 - 98 %    Base excess (POC) 0 mmol/L    Sodium,  136 - 145 MMOL/L    Potassium, POC 3.5 3.5 - 5.1 MMOL/L    Glucose,  (H) 65 - 100 MG/DL Calcium, ionized (POC) 1.20 1. 12 - 1.32 mmol/L   POC CG8I    Collection Time: 08/10/20 10:44 AM   Result Value Ref Range    pH (POC) 7.42 7.35 - 7.45      pCO2 (POC) 36.3 35 - 45 MMHG    pO2 (POC) 297 (H) 75 - 100 MMHG    HCO3 (POC) 23.7 22 - 26 MMOL/L    sO2 (POC) 100 (H) 95 - 98 %    Base deficit (POC) 1 mmol/L    Sodium,  136 - 145 MMOL/L    Potassium, POC 4.3 3.5 - 5.1 MMOL/L    Glucose,  (H) 65 - 100 MG/DL    Calcium, ionized (POC) 1.13 1.12 - 1.32 mmol/L   TEG GLOBAL HEMOSTASIS    Collection Time: 08/10/20 11:45 AM   Result Value Ref Range    Citrated Kaolin R-Time 5.2 4.6 - 9.1 MINS    Citrated Kaolin K-Time 1.2 0.8 - 2.1 MINS    Citrated Kaolin Angle 74.6 63 - 78 deg    Citrated Kaolin Maximum Amplitude 57.7 52 - 69 mm    Citrated RapidTEG Maximum Amplitude 56.6 52 - 70 mm    Citrated Kaolin/Heparinase R-Time 5.0 4.3 - 8.3 MINS    Citrated Functional Fibrinogen Maximum Amplitude 18.8 15 - 32 mm    Functional Fibrinogen Level 343.1 278 - 581 mg/dL   POC CG8I    Collection Time: 08/10/20 11:47 AM   Result Value Ref Range    pH (POC) 7.43 7.35 - 7.45      pCO2 (POC) 36.0 35 - 45 MMHG    pO2 (POC) 379 (H) 75 - 100 MMHG    HCO3 (POC) 23.6 22 - 26 MMOL/L    sO2 (POC) 100 (H) 95 - 98 %    Base deficit (POC) 1 mmol/L    Sodium,  136 - 145 MMOL/L    Potassium, POC 3.7 3.5 - 5.1 MMOL/L    Glucose,  (H) 65 - 100 MG/DL    Calcium, ionized (POC) 1.43 (H) 1.12 - 1.32 mmol/L   GLUCOSE, POC    Collection Time: 08/10/20 12:39 PM   Result Value Ref Range    Glucose (POC) 151 (H) 65 - 445 mg/dL   METABOLIC PANEL, BASIC    Collection Time: 08/10/20 12:42 PM   Result Value Ref Range    Sodium 144 136 - 145 mmol/L    Potassium 3.5 3.5 - 5.1 mmol/L    Chloride 113 (H) 98 - 107 mmol/L    CO2 20 (L) 21 - 32 mmol/L    Anion gap 11 7 - 16 mmol/L    Glucose 145 (H) 65 - 100 mg/dL    BUN 23 8 - 23 MG/DL    Creatinine 1.22 0.8 - 1.5 MG/DL    GFR est AA >60 >60 ml/min/1.73m2    GFR est non-AA >60 >60 ml/min/1.73m2    Calcium 8.4 8.3 - 10.4 MG/DL   MAGNESIUM    Collection Time: 08/10/20 12:42 PM   Result Value Ref Range    Magnesium 3.0 (H) 1.8 - 2.4 mg/dL   CBC W/O DIFF    Collection Time: 08/10/20 12:42 PM   Result Value Ref Range    WBC 10.8 4.3 - 11.1 K/uL    RBC 2.86 (L) 4.23 - 5.6 M/uL    HGB 8.7 (L) 13.6 - 17.2 g/dL    HCT 26.7 (L) 41.1 - 50.3 %    MCV 93.4 79.6 - 97.8 FL    MCH 30.4 26.1 - 32.9 PG    MCHC 32.6 31.4 - 35.0 g/dL    RDW 13.3 11.9 - 14.6 %    PLATELET 65 (L) 017 - 450 K/uL    MPV 12.4 (H) 9.4 - 12.3 FL    ABSOLUTE NRBC 0.00 0.0 - 0.2 K/uL   PTT    Collection Time: 08/10/20 12:42 PM   Result Value Ref Range    aPTT 31.0 24.3 - 35.4 SEC   PROTHROMBIN TIME + INR    Collection Time: 08/10/20 12:42 PM   Result Value Ref Range    Prothrombin time 18.0 (H) 12.0 - 14.7 sec    INR 1.4     FIBRINOGEN    Collection Time: 08/10/20 12:42 PM   Result Value Ref Range    Fibrinogen 259 190 - 501 mg/dL   POC CG8I    Collection Time: 08/10/20 12:43 PM   Result Value Ref Range    Device: VENT      FIO2 (POC) 45 %    pH (POC) 7.37 7.35 - 7.45      pCO2 (POC) 33.3 (L) 35 - 45 MMHG    pO2 (POC) 115 (H) 75 - 100 MMHG    HCO3 (POC) 19.4 (L) 22 - 26 MMOL/L    sO2 (POC) 98 95 - 98 %    Base deficit (POC) 5 mmol/L    Mode Pressure regulated volume control      Tidal volume 550 ml    Set Rate 18 bpm    PEEP/CPAP (POC) 8 cmH2O    Allens test (POC) NOT APPLICABLE      Site DRAWN FROM ARTERIAL LINE      Specimen type (POC) ARTERIAL      Sodium,  136 - 145 MMOL/L    Potassium, POC 3.4 (L) 3.5 - 5.1 MMOL/L    Glucose,  (H) 65 - 100 MG/DL    Calcium, ionized (POC) 1.31 1.12 - 1.32 mmol/L    Performed by Woodrow     Exhaled minute volume 9.70 L/min    COLLECT TIME 1,239     EKG, 12 LEAD, INITIAL    Collection Time: 08/10/20  1:02 PM   Result Value Ref Range    Ventricular Rate 74 BPM    Atrial Rate 74 BPM    P-R Interval 182 ms    QRS Duration 118 ms    Q-T Interval 454 ms    QTC Calculation (Bezet) 503 ms Calculated P Axis 75 degrees    Calculated R Axis -69 degrees    Calculated T Axis 78 degrees    Diagnosis       Normal sinus rhythm  Left axis deviation  Non-specific intra-ventricular conduction delay  Nonspecific T wave abnormality  Abnormal ECG  When compared with ECG of 03-AUG-2020 08:23,  Nonspecific T wave abnormality, worse in Lateral leads  QT has lengthened  Confirmed by Prabhakar Pacheco MD (), LOLLY CUENCA ([id number]) on 8/10/2020 2:28:05 PM     GLUCOSE, POC    Collection Time: 08/10/20  1:15 PM   Result Value Ref Range    Glucose (POC) 142 (H) 65 - 100 mg/dL   TEG GLOBAL HEMOSTASIS    Collection Time: 08/10/20  2:13 PM   Result Value Ref Range    Citrated Kaolin R-Time 6.7 4.6 - 9.1 MINS    Citrated Kaolin K-Time 1.5 0.8 - 2.1 MINS    Citrated Kaolin Angle 70.6 63 - 78 deg    Citrated Kaolin Maximum Amplitude 55.5 52 - 69 mm    Citrated RapidTEG Maximum Amplitude 54.7 52 - 70 mm    Citrated Kaolin/Heparinase R-Time 6.2 4.3 - 8.3 MINS    Citrated Functional Fibrinogen Maximum Amplitude 17.9 15 - 32 mm    Functional Fibrinogen Level 326.6 278 - 581 mg/dL   GLUCOSE, POC    Collection Time: 08/10/20  2:13 PM   Result Value Ref Range    Glucose (POC) 134 (H) 65 - 100 mg/dL   GLUCOSE, POC    Collection Time: 08/10/20  3:12 PM   Result Value Ref Range    Glucose (POC) 132 (H) 65 - 100 mg/dL   GLUCOSE, POC    Collection Time: 08/10/20  4:07 PM   Result Value Ref Range    Glucose (POC) 144 (H) 65 - 100 mg/dL   MAGNESIUM    Collection Time: 08/10/20  4:09 PM   Result Value Ref Range    Magnesium 2.7 (H) 1.8 - 2.4 mg/dL   POTASSIUM    Collection Time: 08/10/20  4:09 PM   Result Value Ref Range    Potassium 3.8 3.5 - 5.1 mmol/L   HGB & HCT    Collection Time: 08/10/20  4:09 PM   Result Value Ref Range    HGB 9.9 (L) 13.6 - 17.2 g/dL    HCT 30.3 (L) 41.1 - 50.3 %   GLUCOSE, POC    Collection Time: 08/10/20  4:58 PM   Result Value Ref Range    Glucose (POC) 152 (H) 65 - 100 mg/dL   GLUCOSE, POC    Collection Time: 08/10/20 5:59 PM   Result Value Ref Range    Glucose (POC) 151 (H) 65 - 100 mg/dL   GLUCOSE, POC    Collection Time: 08/10/20  7:16 PM   Result Value Ref Range    Glucose (POC) 142 (H) 65 - 100 mg/dL   POC G3    Collection Time: 08/10/20  7:43 PM   Result Value Ref Range    Device: VENT      FIO2 (POC) 25 %    pH (POC) 7.32 (L) 7.35 - 7.45      pCO2 (POC) 35.3 35 - 45 MMHG    pO2 (POC) 118 (H) 75 - 100 MMHG    HCO3 (POC) 18.3 (L) 22 - 26 MMOL/L    sO2 (POC) 98 95 - 98 %    Base deficit (POC) 7 mmol/L    Mode VENTILATOR/SPONTANEOUS/PRESSURE SUPPORT      PEEP/CPAP (POC) 8 cmH2O    Allens test (POC) NOT APPLICABLE      Site DRAWN FROM ARTERIAL LINE      Specimen type (POC) ARTERIAL      Performed by Harris     CO2, POC 19 MMOL/L    Exhaled minute volume 6.20 L/min    COLLECT TIME 1,940     GLUCOSE, POC    Collection Time: 08/10/20  8:54 PM   Result Value Ref Range    Glucose (POC) 130 (H) 65 - 100 mg/dL   MAGNESIUM    Collection Time: 08/10/20  8:55 PM   Result Value Ref Range    Magnesium 2.6 (H) 1.8 - 2.4 mg/dL   POTASSIUM    Collection Time: 08/10/20  8:55 PM   Result Value Ref Range    Potassium 4.0 3.5 - 5.1 mmol/L   HGB & HCT    Collection Time: 08/10/20  8:55 PM   Result Value Ref Range    HGB 9.8 (L) 13.6 - 17.2 g/dL    HCT 29.7 (L) 41.1 - 50.3 %   GLUCOSE, POC    Collection Time: 08/10/20 10:10 PM   Result Value Ref Range    Glucose (POC) 127 (H) 65 - 100 mg/dL   GLUCOSE, POC    Collection Time: 08/10/20 11:10 PM   Result Value Ref Range    Glucose (POC) 122 (H) 65 - 100 mg/dL   GLUCOSE, POC    Collection Time: 08/11/20 12:12 AM   Result Value Ref Range    Glucose (POC) 118 (H) 65 - 100 mg/dL   GLUCOSE, POC    Collection Time: 08/11/20  1:09 AM   Result Value Ref Range    Glucose (POC) 107 (H) 65 - 100 mg/dL   GLUCOSE, POC    Collection Time: 08/11/20  2:32 AM   Result Value Ref Range    Glucose (POC) 99 65 - 100 mg/dL   GLUCOSE, POC    Collection Time: 08/11/20  3:13 AM   Result Value Ref Range    Glucose (POC) 104 (H) 65 - 100 mg/dL   MAGNESIUM    Collection Time: 08/11/20  3:14 AM   Result Value Ref Range    Magnesium 2.5 (H) 1.8 - 2.4 mg/dL   METABOLIC PANEL, BASIC    Collection Time: 08/11/20  3:14 AM   Result Value Ref Range    Sodium 144 136 - 145 mmol/L    Potassium 4.5 3.5 - 5.1 mmol/L    Chloride 114 (H) 98 - 107 mmol/L    CO2 22 21 - 32 mmol/L    Anion gap 8 7 - 16 mmol/L    Glucose 94 65 - 100 mg/dL    BUN 21 8 - 23 MG/DL    Creatinine 1.10 0.8 - 1.5 MG/DL    GFR est AA >60 >60 ml/min/1.73m2    GFR est non-AA >60 >60 ml/min/1.73m2    Calcium 8.0 (L) 8.3 - 10.4 MG/DL   CBC W/O DIFF    Collection Time: 08/11/20  3:14 AM   Result Value Ref Range    WBC 10.3 4.3 - 11.1 K/uL    RBC 3.17 (L) 4.23 - 5.6 M/uL    HGB 9.6 (L) 13.6 - 17.2 g/dL    HCT 29.5 (L) 41.1 - 50.3 %    MCV 93.1 79.6 - 97.8 FL    MCH 30.3 26.1 - 32.9 PG    MCHC 32.5 31.4 - 35.0 g/dL    RDW 13.3 11.9 - 14.6 %    PLATELET 272 (L) 208 - 450 K/uL    MPV 12.5 (H) 9.4 - 12.3 FL    ABSOLUTE NRBC 0.00 0.0 - 0.2 K/uL   GLUCOSE, POC    Collection Time: 08/11/20  4:25 AM   Result Value Ref Range    Glucose (POC) 111 (H) 65 - 100 mg/dL   GLUCOSE, POC    Collection Time: 08/11/20  5:20 AM   Result Value Ref Range    Glucose (POC) 109 (H) 65 - 100 mg/dL   GLUCOSE, POC    Collection Time: 08/11/20  6:41 AM   Result Value Ref Range    Glucose (POC) 115 (H) 65 - 100 mg/dL       Assessment:     Active Problems:    CAD (coronary artery disease) (8/10/2020)      Atherosclerosis of native coronary artery of native heart with unstable angina pectoris (HCC) (8/10/2020)      Hypoxemia (8/10/2020)      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

## 2020-08-11 NOTE — PROGRESS NOTES
Respiratory Mechanics completed and are as follows:  Weaning Parameters  Spontaneous Breathing Trial Complete: Yes  Resp Rate Observed: 10  Ve: 7.5  VT: 754  RSBI: 13  NIF: -20  VC: 1302  [place] Agitation Sedation Scale (RASS): Alert and calm  Patient extubated to Airvo with 40L flow and 30% FiO2. Patient is able to communicate and is negative for stridor. Breath sounds are clear and diminished. No complications with extubation.      Halima Beck, RT

## 2020-08-11 NOTE — PROGRESS NOTES
TRANSFER - OUT REPORT:    Verbal report given to JULIANN Smith(name) on Hermelindo Hmapton  being transferred to [facility](unit) for routine progression of care       Report consisted of patients Situation, Background, Assessment and   Recommendations(SBAR). Information from the following report(s) SBAR, Kardex, Intake/Output, MAR, Recent Results and Cardiac Rhythm NSR was reviewed with the receiving nurse. Opportunity for questions and clarification was provided.       Patient transported with:   Monitor  Registered Nurse

## 2020-08-11 NOTE — PROGRESS NOTES
TRANSFER - IN REPORT:    Verbal report received from [facility] 4985 RN(name) on Hermelindo Hampton  being received from [facility] ICU(unit) for routine progression of care      Report consisted of patients Situation, Background, Assessment and   Recommendations(SBAR). Information from the following report(s) SBAR was reviewed with the receiving nurse. Opportunity for questions and clarification was provided. Assessment completed upon patients arrival to unit and care assumed.

## 2020-08-11 NOTE — PROGRESS NOTES
Ventilator check complete; patient has a #8. 0 ET tube secured at the 24 at the lip. Patient is not sedated. Patient is able to follow commands. Breath sounds are clear and diminished. Trachea is midline, Negative for subcutaneous air, and chest excursion is symmetric. Patient is also Negative for cyanosis and is Negative for pitting edema. All alarms are set and audible. Resuscitation bag is at the head of the bed.       Ventilator Settings  Mode FIO2 Rate Tidal Volume Pressure PEEP I:E Ratio   Pressure support  30 %   0 cm H2O  8 cm H20  1:2.5      Peak airway pressure: 10 cm H2O   Minute ventilation: 10.5 l/min       RT Joaquina

## 2020-08-11 NOTE — PROGRESS NOTES
Problem: Mobility Impaired (Adult and Pediatric)  Goal: *Acute Goals and Plan of Care (Insert Text)  Outcome: Progressing Towards Goal  Note:   1. Mr. Elvira Samuel will perform supine to sit and sit to supine with bed flat and no rails independently in 7 days. 2.  Mr. Elvira Samuel will perform sit to stand and bed to chair with least restrictive device independently in 7 days. 3.  Mr. Elvira Samuel will perform gait with least restrictive device >500 ft independently in 7 days. 4.  Mr. Elvira Samuel will perform therex to bilateral lower extremities x 25 reps in sitting and standing in 7 days. PHYSICAL THERAPY: Initial Assessment and Daily Note 8/11/2020  INPATIENT: PT Visit Days : 1  [place] / [place] / Product Type: Medicare /       NAME/AGE/GENDER: Raquel An is a [de-identified] y.o. male   PRIMARY DIAGNOSIS: Atherosclerosis of native coronary artery of native heart with unstable angina pectoris ([facility]) [I25.110]  CAD (coronary artery disease) [I25.10]  Atherosclerosis of native coronary artery of native heart with unstable angina pectoris ([facility] [address] [I25.110] <principal problem not specified> <principal problem not specified>  Procedure(s) (LRB):  CORONARY ARTERY BYPASS GRAFT (CABG X2), LIMA (N/A)  VEIN HARVEST, GREATER SAPHENOUS (Right)  ESOPHAGEAL TRANS ECHOCARDIOGRAM (N/A)  1 Day Post-Op  ICD-10: Treatment Diagnosis:    · Generalized Muscle Weakness (M62.81)  · Difficulty in walking, Not elsewhere classified (R26.2)   Precaution/Allergies:  Adhesive and Tamsulosin      ASSESSMENT:     Mr. Elvira Samuel presents sitting up in the chair. Chest tube still in. O2. He is 6'4\". Lives with his wife and quite active prior to surgery. Still driving. He endorses a little pain in his chest but just a little soreness. Sit to stand with cg. Using heart pillow. Gait with ICU rollator 110 ft at a fairly good pace. No SOB noted. Stand to sit with cg.   Once in the chair was explaining to him that he would see PT 2 x a day while here in the hospital.   He responded he didn't want PT. \"Tasha had it before and it didn't help. \"  took some explaining to inform him that this kind of PT was for patients with CABG and it was an important part of the team to get him stronger to get out of the hospital.  He was frustrated about sitting around. Showed him exercises and had him perform them. Suggested he do these whenever he wanted to. By the end he actually thanked me. He is expected to go to the floor and get his chest tube out today. Mr. Andreia Cerrato is functioning below baseline and is therefore appropriate for skilled PT to maximize his rehab potential.  Expect home health PT at discharge. This section established at most recent assessment   PROBLEM LIST (Impairments causing functional limitations):  1. Decreased Strength  2. Decreased Transfer Abilities  3. Decreased Ambulation Ability/Technique  4. Decreased Activity Tolerance   INTERVENTIONS PLANNED: (Benefits and precautions of physical therapy have been discussed with the patient.)  1. Bed Mobility  2. Gait Training  3. Therapeutic Activites  4. Therapeutic Exercise/Strengthening  5. Transfer Training     TREATMENT PLAN: Frequency/Duration: twice daily for duration of hospital stay  Rehabilitation Potential For Stated Goals: Good     REHAB RECOMMENDATIONS (at time of discharge pending progress):    Placement: It is my opinion, based on this patient's performance to date, that Mr. Andreia Cerrato may benefit from [address] after discharge due to the functional deficits listed above that are likely to improve with skilled rehabilitation because he/she has multiple medical issues that affect his/her functional mobility in the community. Equipment:    To be determined. HISTORY:   History of Present Injury/Illness (Reason for Referral):  As above.    Past Medical History/Comorbidities:   Mr. Andreia Cerrato  has a past medical history of Arthritis, Buerger's disease ([facility] [address] CAD (coronary artery disease), Decreased mobility, Gout, Hematuria, microscopic, Hiatal hernia, Personal history of prostate cancer, Prostate cancer ([facility] [address] (~2000), and Sleep apnea. He also has no past medical history of Difficult intubation, Malignant hyperthermia due to anesthesia, Nausea & vomiting, or Pseudocholinesterase deficiency. Mr. Brinda Villalobos  has a past surgical history that includes pr appendectomy; hx other surgical; hx cyst removal (Left); hx heart catheterization (08/03/2020); hx colonoscopy; hx turp; and hx cataract removal (Bilateral). Social History/Living Environment:   Home Environment: Private residence  # Steps to Enter: 0  One/Two Story Residence: One story  Living Alone: No  Patient Expects to be Discharged to[de-identified] Private residence  Current DME Used/Available at Home: Tiffanie Bears, straight(doesnt use it.)  Tub or Shower Type: Shower  Prior Level of Function/Work/Activity:  independent  Age,    Number of Personal Factors/Comorbidities that affect the Plan of Care: 1-2: MODERATE COMPLEXITY   EXAMINATION:   Most Recent Physical Functioning:   Gross Assessment:  AROM: Within functional limits  PROM: Within functional limits  Strength: Within functional limits  Sensation: Intact               Posture:  Posture (WDL): Exceptions to WDL  Posture Assessment: Forward head, Rounded shoulders  Balance:  Sitting: Intact  Standing: With support Bed Mobility:     Wheelchair Mobility:     Transfers:  Sit to Stand: Contact guard assistance  Stand to Sit: Contact guard assistance  Gait:     Base of Support: Widened  Speed/Alesia: Slow  Step Length: Right shortened;Left shortened  Distance (ft): 110 Feet (ft)  Assistive Device: Walker, rolling  Ambulation - Level of Assistance: Contact guard assistance      Body Structures Involved:  1. Muscles Body Functions Affected:  1. Movement Related Activities and Participation Affected:  1.  Mobility   Number of elements that affect the Plan of Care: 3: MODERATE COMPLEXITY   CLINICAL PRESENTATION:   Presentation: Evolving clinical presentation with changing clinical characteristics: MODERATE COMPLEXITY   CLINICAL DECISION MAKIN [facility] Mobility Inpatient Short Form  How much difficulty does the patient currently have. .. Unable A Lot A Little None   1. Turning over in bed (including adjusting bedclothes, sheets and blankets)? [] 1   [] 2   [x] 3   [] 4   2. Sitting down on and standing up from a chair with arms ( e.g., wheelchair, bedside commode, etc.)   [] 1   [] 2   [x] 3   [] 4   3. Moving from lying on back to sitting on the side of the bed? [] 1   [] 2   [x] 3   [] 4   How much help from another person does the patient currently need. .. Total A Lot A Little None   4. Moving to and from a bed to a chair (including a wheelchair)? [] 1   [] 2   [x] 3   [] 4   5. Need to walk in hospital room? [] 1   [] 2   [x] 3   [] 4   6. Climbing 3-5 steps with a railing? [] 1   [] 2   [x] 3   [] 4   © , Trustees of [address] [place] [address], under license to [employer]. All rights reserved      Score:  Initial: 18 Most Recent: X (Date: -- )    Interpretation of Tool:  Represents activities that are increasingly more difficult (i.e. Bed mobility, Transfers, Gait). Medical Necessity:     · Patient is expected to demonstrate progress in   · functional technique  ·  to   · increase independence with mobility and gait. · .  Reason for Services/Other Comments:  · Patient   · continues to require present interventions due to patient's inability to function at baseline.    · .   Use of outcome tool(s) and clinical judgement create a POC that gives a: Questionable prediction of patient's progress: MODERATE COMPLEXITY            TREATMENT:   (In addition to Assessment/Re-Assessment sessions the following treatments were rendered)   Pre-treatment Symptoms/Complaints:  none  Pain: Initial:   Pain Intensity 1: 1  Pain Location 1: Chest  Pain Intervention(s) 1: Emotional support  Post Session:  none     Therapeutic Exercise: ( 10):  Exercises per grid below to improve strength. Required minimal verbal and tactile cues to promote proper body mechanics. Progressed repetitions as indicated. Date:  8/11 Date:   Date:     Activity/Exercise Parameters Parameters Parameters   AP 15     LAQ 12     marching 12     Glut sets 10     Incentive spirometer 12                     Braces/Orthotics/Lines/Etc:   · arroyo catheter  · Chest tube. · O2 Device: Nasal cannula  Treatment/Session Assessment:    · Response to Treatment:  good   · Interdisciplinary Collaboration:   o Registered Nurse  · After treatment position/precautions:   o Up in chair  o Call light within reach  o RN notified   · Compliance with Program/Exercises: Will assess as treatment progresses  · Recommendations/Intent for next treatment session: \"Next visit will focus on advancements to more challenging activities and reduction in assistance provided\".   Total Treatment Duration:  PT Patient Time In/Time Out  Time In: 0830  Time Out: Burke Romero PT

## 2020-08-11 NOTE — PROGRESS NOTES
Critical Care Daily Progress Note: 8/11/2020    Garett Willams   Admission Date: 8/10/2020         The patient's chart is reviewed and the patient is discussed with the staff. This is an [de-identified] y/o male with hx of PIETER and recent abnormal stress test done because of chest pain. 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 Patient is seen at the request of Dr. Dev Dominguez for respiratory management status post cardiac surgery. Patient had CABG x 2. Currently is sedated in CV-ICU and orally intubated receiving  mechanical ventilation.     We have been asked to see in the CV-ICU for mechanical ventilation management and weaning. Subjective:     Extubated last night. Now on NC and up in chair. Wore home BIPAP overnight. Not having significant pain. Still on a little insulin but weaning off.      Current Facility-Administered Medications   Medication Dose Route Frequency    alcohol 62% (NOZIN) nasal  1 Ampule  1 Ampule Topical Q12H    0.9% sodium chloride infusion  25 mL/hr IntraVENous CONTINUOUS    dextrose 5% - 0.45% NaCl with KCl 20 mEq/L infusion  25 mL/hr IntraVENous CONTINUOUS    sodium chloride (NS) flush 5-40 mL  5-40 mL IntraVENous Q8H    sodium chloride (NS) flush 5-40 mL  5-40 mL IntraVENous PRN    oxyCODONE-acetaminophen (PERCOCET) 5-325 mg per tablet 1 Tab  1 Tab Oral Q4H PRN    morphine 10 mg/ml injection 3-5 mg  3-5 mg IntraVENous Q1H PRN    naloxone (NARCAN) injection 0.4 mg  0.4 mg IntraVENous PRN    sodium bicarbonate (8.4%) injection 50 mEq  50 mEq IntraVENous PRN    EPINEPHrine (ADRENALIN) 4 mg in 0.9% sodium chloride 250 mL infusion  0.01-0.5 mcg/kg/min IntraVENous TITRATE    nitroglycerin (Tridil) 200 mcg/ml infusion   mcg/min IntraVENous TITRATE    PHENYLephrine (FRANCISCO-SYNEPHRINE) 30 mg in 0.9% sodium chloride 250 mL infusion   mcg/min IntraVENous TITRATE    lidocaine (PF) (XYLOCAINE) 100 mg/5 mL (2 %) injection syringe  mg   mg IntraVENous ONCE PRN    niCARdipine in Saline (CARDENE) 25 MG/250 mL infusion kit  5-15 mg/hr IntraVENous TITRATE    amiodarone (CORDARONE) tablet 200 mg  200 mg Oral Q12H    metoprolol tartrate (LOPRESSOR) tablet 25 mg  25 mg Oral Q12H    atorvastatin (LIPITOR) tablet 80 mg  80 mg Oral QHS    insulin regular (NOVOLIN R, HUMULIN R) 100 Units in 0.9% sodium chloride 100 mL infusion  1 Units/hr IntraVENous TITRATE    dextrose (D50W) injection syrg 12.5 g  25 mL IntraVENous PRN    aspirin chewable tablet 81 mg  81 mg Oral DAILY    magnesium sulfate 1 g/100 ml IVPB (premix or compounded)  1 g IntraVENous PRN    potassium chloride 10 mEq in 50 ml IVPB  10 mEq IntraVENous PRN    midazolam (VERSED) injection 1 mg  1 mg IntraVENous Q1H PRN    propofol (DIPRIVAN) 10 mg/mL infusion  0-50 mcg/kg/min IntraVENous TITRATE    meperidine (DEMEROL) injection 25 mg  25 mg IntraVENous Q1H PRN    chlorhexidine (PERIDEX) 0.12 % mouthwash 10 mL  10 mL Oral BID    tuberculin injection 5 Units  5 Units IntraDERMal ONCE    famotidine (PEPCID) tablet 20 mg  20 mg Oral DAILY    ondansetron (ZOFRAN) injection 4 mg  4 mg IntraVENous Q4H PRN       Review of Systems    Constitutional:  negative for fever, chills, sweats  Cardiovascular:  negative for chest pain, palpitations, syncope, edema  Gastrointestinal:  negative for dysphagia, reflux, vomiting, diarrhea, abdominal pain, or melena  Neurologic:  negative for focal weakness, numbness, headache      Objective:     Vitals:    08/11/20 0500 08/11/20 0515 08/11/20 0516 08/11/20 0530   BP: 105/56  104/57    Pulse: 78 79 78 78   Resp: 13 21 16 23   Temp:       SpO2: 100% 100% 100% 100%   Weight:             Intake/Output Summary (Last 24 hours) at 8/11/2020 0639  Last data filed at 8/11/2020 0467  Gross per 24 hour   Intake 3023.94 ml   Output 2380 ml   Net 643.94 ml       Physical Exam:          Constitutional:  the patient is well developed and in no acute distress  EENMT:  Sclera clear, pupils equal, oral mucosa moist  Respiratory: decreased BS   Cardiovascular:  RRR without M,G,R  Gastrointestinal: soft and non-tender; with positive bowel sounds. Musculoskeletal: warm without cyanosis. There is no lower extremity edema. Skin:  no jaundice or rashes  Neurologic: no gross neuro deficits     Psychiatric:  alert and oriented x 3    LINES:  R IJ Cordis    DRIPS:  IVF, insulin    CXR:           LAB  Recent Labs     08/11/20  0520 08/11/20  0425 08/11/20 0313 08/11/20  0232 08/11/20  0109   GLUCPOC 109* 111* 104* 99 107*      Recent Labs     08/11/20  0314 08/10/20  2055 08/10/20  1609 08/10/20  1242   WBC 10.3  --   --  10.8   HGB 9.6* 9.8* 9.9* 8.7*   HCT 29.5* 29.7* 30.3* 26.7*   *  --   --  65*   INR  --   --   --  1.4     Recent Labs     08/11/20  0314 08/10/20  2055 08/10/20  1609 08/10/20  1242     --   --  144   K 4.5 4.0 3.8 3.5   *  --   --  113*   CO2 22  --   --  20*   GLU 94  --   --  145*   BUN 21  --   --  23   CREA 1.10  --   --  1.22   MG 2.5* 2.6* 2.7* 3.0*   CA 8.0*  --   --  8.4     Recent Labs     08/10/20  1943 08/10/20  1243 08/10/20  1147   PHI 7.32* 7.37 7.43   PCO2I 35.3 33.3* 36.0   PO2I 118* 115* 379*   HCO3I 18.3* 19.4* 23.6     No results for input(s): LCAD, LAC in the last 72 hours.   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    Central sleep apnea G47.31         Assessment:  (Medical Decision Making)     Hospital Problems  Date Reviewed: 8/10/2020          Codes Class Noted POA    Central sleep apnea ICD-10-CM: G47.31  ICD-9-CM: 780.57  8/11/2020 Unknown    On BIPAP at home    CAD (coronary artery disease) ICD-10-CM: I25.10  ICD-9-CM: 414.00  8/10/2020 Unknown        Atherosclerosis of native coronary artery of native heart with unstable angina pectoris ([facility] [address] ICD-10-CM: I25.110  ICD-9-CM: 414.01, 411.1  8/10/2020 Unknown        Hypoxemia ICD-10-CM: R09.02  ICD-9-CM: 799.02  8/10/2020 Unknown    Weaned to 2L. S/P CABG x 2 ICD-10-CM: Z95.1  ICD-9-CM: V45.81  8/10/2020 Unknown    Stable and off pressors. Plan:  (Medical Decision Making)     Wean oxygen as able. Home BIPAP with sleep. Mobilize. Control pain. --    More than 50% of the time documented was spent in face-to-face contact with the patient and in the care of the patient on the floor/unit where the patient is located.     Alanis Olivares MD

## 2020-08-11 NOTE — OP NOTES
[facility]  OPERATIVE REPORT    Name:  Katelin Mcarthur  MR#:  [id number]  :  1940  ACCOUNT #:  [de-identified]  DATE OF SERVICE:  08/10/2020    PREOPERATIVE DIAGNOSIS:  Left main coronary artery disease. POSTOPERATIVE DIAGNOSIS:  Left main coronary artery disease. PROCEDURE PERFORMED:  Coronary artery bypass grafting x2. Grafts consisting of:  1. LIMA to the LAD. 2.  Reverse saphenous vein graft to OM. SURGEON:  MD Heather Fuentes    ANESTHESIA:  General endotracheal.    COMPLICATIONS:  None. SPECIMENS REMOVED:  .    IMPLANTS:  .    ESTIMATED BLOOD LOSS:  Minimal.    OPERATIVE FINDINGS:  Saphenous vein was large 8-10 mm. LIMA 3 mm. Aorta was soft. No palpable plaque. LAD is 1.4 mm with moderate distal disease. OM is 1.6 mm, moderate distal disease. INDICATION:  The patient is a very pleasant 79-year-old gentleman who had been noticing some exertional chest pain with a right arm pain and progressively worsening fatigue. He underwent nuclear stress testing which was abnormal for inferior septal ischemia. Left heart cath showed left main coronary artery disease with preserved LV function. DESCRIPTION OF PROCEDURE:  After informed consent was obtained for the above-mentioned procedure, the patient was then taken to the operating room. Appropriate monitoring lines were placed by the Anesthesia Department. After administration of general endotracheal anesthesia, the patient was prepped and draped in the usual fashion with Betadine scrub and paint and I[place]an cardiovascular drapes. The chest was then entered through a standard mediastinotomy incision while simultaneous harvesting of peripheral conduit was undertaken. After harvesting the conduit, it was inspected and noted to be adequate. The incisions were subsequently closed in a 2-layer fashion of 3-0 and 4-0 Vicryl.   The left internal mammary harvest was then undertaken in pedicle fashion and was injected with Papaverine solution. After administration of heparin, the distal pedicle was incised and noted to bleed briskly. A thoracostomy tube was then placed. This was brought out through a separate stab incision inferiorly and affixed to the skin. The pericardium was then opened and tacked up into a pericardial well, revealing the heart. Pursestrings were then placed into the aorta, the right atrial appendage, and the right atrium. After adequate ACT was obtained, the patient was then cannulated through these pursestrings, at which point cardiopulmonary bypass was started. Target vessels were identified and marked. A crossclamp was then applied. Antegrade and retrograde cardioplegia was administered initially and then given intermittently throughout the case. A latticed heart support was placed to assist with the distal anastomoses, which were performed by using 7-0 Prolene for vein to artery anastomosis and 8-0 Prolene for artery-to-artery anastomosis. Proximal anastomoses to the aorta were also placed using 6-0 Prolene. At the conclusion of the final proximal anastomosis, the aorta and right ventricle were flushed of debris and the anastomosis was completed. The crossclamp was then removed. The heart then underwent meticulous de-airing. The patient was warmed and weaned from the cardiopulmonary bypass. The distal anastomoses were visualized and noted to be hemostatic. All grafts were dopplered and noted to have an excellent flow. The venous cannula was then removed. Ventricular and atrial pacing wires were then placed upon the heart, brought out through stab wounds inferiorly and affixed to the skin. A single straight mediastinal tube was placed, brought out through a separate stab incision inferiorly, and also affixed to the skin. After meticulous hemostasis was made, the sternum was reapproximated with heavy gauge stainless steel wire.   The midline fascia was subsequently closed separately. Vicryl was then used in a running fashion for subcutaneous tissue and skin. Dressings were then applied to all wounds. The patient was then transported with monitors to the intensive care unit intubated and ventilated. All instruments and sponge counts were correct at the end of the case.       Felix Wylie MD      [id number]  D:  08/10/2020 12:39  T:  08/10/2020 22:46  JOB #:  [id number]

## 2020-08-11 NOTE — PROGRESS NOTES
TIMEOUT performed prior to extubation on [facility] with RT, primary RN, and charge RN present on 8/10/2020 at 8:38 PM.     Per anesthesia team on admission, patient's intubation was Normal    ABG results as follows:    Lab Results   Component Value Date/Time    pH (POC) 7.32 (L) 08/10/2020 07:43 PM    pCO2 (POC) 35.3 08/10/2020 07:43 PM    pO2 (POC) 118 (H) 08/10/2020 07:43 PM    HCO3 (POC) 18.3 (L) 08/10/2020 07:43 PM    sO2 (POC) 98 08/10/2020 07:43 PM    Base deficit (POC) 7 08/10/2020 07:43 PM         The patient is hemodynamically stable and can demonstrate the following on a consistent basis:  follow commands , elevate head off the pillow, nods appropriately to questions. Dr. Jeremie Sarabia notified of weaning parameters and order to extubate obtained. Patient extubated by (RT name) Juanjose Sevilla to (Type of O2 Device) Heated high flow at (Amount of of O2) 30% fiO2 without complication.

## 2020-08-11 NOTE — PROGRESS NOTES
R IJ cordis, arroyo, MCT and PCT removed with no complications. Petroleum gauze, 4x4 dressing placed over CT sites. Occlusive dressing placed over cordis site after hemostasis achieved. VSS. Pt tolerated well.

## 2020-08-12 ENCOUNTER — APPOINTMENT (OUTPATIENT)
Dept: GENERAL RADIOLOGY | Age: 80
DRG: 236 | End: 2020-08-12
Attending: THORACIC SURGERY (CARDIOTHORACIC VASCULAR SURGERY)
Payer: MEDICARE

## 2020-08-12 PROBLEM — I25.10 CAD (CORONARY ARTERY DISEASE): Chronic | Status: ACTIVE | Noted: 2020-08-10

## 2020-08-12 PROBLEM — D69.6 THROMBOCYTOPENIA (HCC): Status: ACTIVE | Noted: 2020-08-12

## 2020-08-12 PROBLEM — R09.02 HYPOXEMIA: Chronic | Status: RESOLVED | Noted: 2020-08-10 | Resolved: 2020-08-12

## 2020-08-12 PROBLEM — R09.02 HYPOXEMIA: Chronic | Status: ACTIVE | Noted: 2020-08-10

## 2020-08-12 PROBLEM — G47.31 CENTRAL SLEEP APNEA: Chronic | Status: ACTIVE | Noted: 2020-08-11

## 2020-08-12 PROBLEM — I25.110 ATHEROSCLEROSIS OF NATIVE CORONARY ARTERY OF NATIVE HEART WITH UNSTABLE ANGINA PECTORIS (HCC): Chronic | Status: ACTIVE | Noted: 2020-08-10

## 2020-08-12 LAB
ANION GAP SERPL CALC-SCNC: 5 MMOL/L (ref 7–16)
BUN SERPL-MCNC: 27 MG/DL (ref 8–23)
CALCIUM SERPL-MCNC: 8.2 MG/DL (ref 8.3–10.4)
CHLORIDE SERPL-SCNC: 109 MMOL/L (ref 98–107)
CO2 SERPL-SCNC: 26 MMOL/L (ref 21–32)
CREAT SERPL-MCNC: 1.75 MG/DL (ref 0.8–1.5)
ERYTHROCYTE [DISTWIDTH] IN BLOOD BY AUTOMATED COUNT: 13.6 % (ref 11.9–14.6)
GLUCOSE BLD STRIP.AUTO-MCNC: 135 MG/DL (ref 65–100)
GLUCOSE BLD STRIP.AUTO-MCNC: 148 MG/DL (ref 65–100)
GLUCOSE BLD STRIP.AUTO-MCNC: 153 MG/DL (ref 65–100)
GLUCOSE BLD STRIP.AUTO-MCNC: 158 MG/DL (ref 65–100)
GLUCOSE SERPL-MCNC: 148 MG/DL (ref 65–100)
HCT VFR BLD AUTO: 28.6 % (ref 41.1–50.3)
HGB BLD-MCNC: 8.8 G/DL (ref 13.6–17.2)
MAGNESIUM SERPL-MCNC: 2.3 MG/DL (ref 1.8–2.4)
MCH RBC QN AUTO: 29.7 PG (ref 26.1–32.9)
MCHC RBC AUTO-ENTMCNC: 30.8 G/DL (ref 31.4–35)
MCV RBC AUTO: 96.6 FL (ref 79.6–97.8)
MM INDURATION POC: 0 MM (ref 0–5)
NRBC # BLD: 0 K/UL (ref 0–0.2)
PLATELET # BLD AUTO: 83 K/UL (ref 150–450)
PMV BLD AUTO: 12.8 FL (ref 9.4–12.3)
POTASSIUM SERPL-SCNC: 4.9 MMOL/L (ref 3.5–5.1)
PPD POC: NEGATIVE NEGATIVE
RBC # BLD AUTO: 2.96 M/UL (ref 4.23–5.6)
SODIUM SERPL-SCNC: 140 MMOL/L (ref 136–145)
WBC # BLD AUTO: 11.2 K/UL (ref 4.3–11.1)

## 2020-08-12 PROCEDURE — 71046 X-RAY EXAM CHEST 2 VIEWS: CPT

## 2020-08-12 PROCEDURE — 74011636637 HC RX REV CODE- 636/637: Performed by: THORACIC SURGERY (CARDIOTHORACIC VASCULAR SURGERY)

## 2020-08-12 PROCEDURE — 83735 ASSAY OF MAGNESIUM: CPT

## 2020-08-12 PROCEDURE — 65660000004 HC RM CVT STEPDOWN

## 2020-08-12 PROCEDURE — 77030012890

## 2020-08-12 PROCEDURE — 85027 COMPLETE CBC AUTOMATED: CPT

## 2020-08-12 PROCEDURE — 36415 COLL VENOUS BLD VENIPUNCTURE: CPT

## 2020-08-12 PROCEDURE — 97110 THERAPEUTIC EXERCISES: CPT

## 2020-08-12 PROCEDURE — 80048 BASIC METABOLIC PNL TOTAL CA: CPT

## 2020-08-12 PROCEDURE — 74011250637 HC RX REV CODE- 250/637: Performed by: THORACIC SURGERY (CARDIOTHORACIC VASCULAR SURGERY)

## 2020-08-12 PROCEDURE — 97530 THERAPEUTIC ACTIVITIES: CPT

## 2020-08-12 PROCEDURE — 99232 SBSQ HOSP IP/OBS MODERATE 35: CPT | Performed by: INTERNAL MEDICINE

## 2020-08-12 PROCEDURE — 82962 GLUCOSE BLOOD TEST: CPT

## 2020-08-12 PROCEDURE — 74011250637 HC RX REV CODE- 250/637: Performed by: PHYSICIAN ASSISTANT

## 2020-08-12 RX ORDER — ATORVASTATIN CALCIUM 40 MG/1
40 TABLET, FILM COATED ORAL
Status: DISCONTINUED | OUTPATIENT
Start: 2020-08-12 | End: 2020-08-18 | Stop reason: HOSPADM

## 2020-08-12 RX ORDER — FAMOTIDINE 20 MG/1
20 TABLET, FILM COATED ORAL DAILY
Status: DISCONTINUED | OUTPATIENT
Start: 2020-08-13 | End: 2020-08-18 | Stop reason: HOSPADM

## 2020-08-12 RX ADMIN — POTASSIUM CHLORIDE 10 MEQ: 750 TABLET, EXTENDED RELEASE ORAL at 08:57

## 2020-08-12 RX ADMIN — FAMOTIDINE 20 MG: 20 TABLET, FILM COATED ORAL at 08:54

## 2020-08-12 RX ADMIN — FUROSEMIDE 40 MG: 40 TABLET ORAL at 08:54

## 2020-08-12 RX ADMIN — INSULIN LISPRO 2 UNITS: 100 INJECTION, SOLUTION INTRAVENOUS; SUBCUTANEOUS at 08:54

## 2020-08-12 RX ADMIN — SENNOSIDES AND DOCUSATE SODIUM 2 TABLET: 8.6; 5 TABLET ORAL at 08:54

## 2020-08-12 RX ADMIN — Medication 10 ML: at 07:05

## 2020-08-12 RX ADMIN — AMIODARONE HYDROCHLORIDE 200 MG: 200 TABLET ORAL at 22:46

## 2020-08-12 RX ADMIN — METOPROLOL TARTRATE 25 MG: 25 TABLET, FILM COATED ORAL at 22:47

## 2020-08-12 RX ADMIN — Medication 10 ML: at 22:53

## 2020-08-12 RX ADMIN — SENNOSIDES AND DOCUSATE SODIUM 2 TABLET: 8.6; 5 TABLET ORAL at 22:46

## 2020-08-12 RX ADMIN — INSULIN LISPRO 2 UNITS: 100 INJECTION, SOLUTION INTRAVENOUS; SUBCUTANEOUS at 17:48

## 2020-08-12 RX ADMIN — Medication 10 ML: at 17:52

## 2020-08-12 RX ADMIN — ATORVASTATIN CALCIUM 40 MG: 40 TABLET, FILM COATED ORAL at 22:47

## 2020-08-12 NOTE — PROGRESS NOTES
Today's Date: 8/12/2020  Date of Admission: 8/10/2020    Chart Reviewed. Subjective:     Patient feels ok. He denies any dizziness or lightheadedness. Appetite fair. Medications Reviewed. Objective:     Vitals:    08/12/20 0421 08/12/20 0635 08/12/20 0734 08/12/20 0854   BP: 94/51  94/55 (!) 83/47   Pulse: 78  83    Resp: 18  16    Temp: 99.1 °F (37.3 °C)  97.2 °F (36.2 °C)    SpO2: 95%  95%    Weight:  209 lb 12.8 oz (95.2 kg)         Intake and Output  Current Shift: 08/12 0701 - 08/12 1900  In: 120 [P.O.:120]  Out: -    Last 3 Shifts: 08/10 1901 - 08/12 0700  In: 1823.9 [P.O.:800; I.V.:1023.9]  Out: 1915 [Urine:1615]    Physical Exam:  General: Well Developed, Well Nourished, No Acute Distress, Alert & Oriented x 3, Appropriate mood  Neck: supple, no JVD  Heart: S1S2 with RRR without murmurs or gallops  Lungs: Clear throughout auscultation bilaterally without adventitious sounds  Abd: soft, nontender, nondistended, with good bowel sounds  Ext: no edema bilaterally  Sternal incision: clean, dry, and intact  Skin: warm and dry    LABS  Data Review:   Recent Labs     08/12/20  0331 08/11/20  0314  08/10/20  1242    144  --  144   K 4.9 4.5   < > 3.5   MG 2.3 2.5*   < > 3.0*   BUN 27* 21  --  23   CREA 1.75* 1.10  --  1.22   * 94  --  145*   WBC 11.2* 10.3  --  10.8   HGB 8.8* 9.6*   < > 8.7*   HCT 28.6* 29.5*   < > 26.7*   PLT 83* 102*  --  65*   INR  --   --   --  1.4    < > = values in this interval not displayed. Estimated Creatinine Clearance: 41.3 mL/min (A) (based on SCr of 1.75 mg/dL (H)).       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

## 2020-08-12 NOTE — PROGRESS NOTES
Problem: Mobility Impaired (Adult and Pediatric)  Goal: *Acute Goals and Plan of Care (Insert Text)  Outcome: Progressing Towards Goal  Note:   1. Mr. Cayetano Aiken will perform supine to sit and sit to supine with bed flat and no rails independently in 7 days. 2.  Mr. Cayetano Aiken will perform sit to stand and bed to chair with least restrictive device independently in 7 days. 3.  Mr. Cayetano Aiken will perform gait with least restrictive device >500 ft independently in 7 days. 4.  Mr. Cayetano Aiken will perform therex to bilateral lower extremities x 25 reps in sitting and standing in 7 days. PHYSICAL THERAPY: Daily Note and PM 8/12/2020  INPATIENT: PT Visit Days : 2  [place] / [place] / Product Type: Medicare /       NAME/AGE/GENDER: Misa Salas is a [de-identified] y.o. male   PRIMARY DIAGNOSIS: Atherosclerosis of native coronary artery of native heart with unstable angina pectoris ([facility] [address] [I25.110]  CAD (coronary artery disease) [I25.10]  Atherosclerosis of native coronary artery of native heart with unstable angina pectoris (HCC) [I25.110] S/P CABG x 2 S/P CABG x 2  Procedure(s) (LRB):  CORONARY ARTERY BYPASS GRAFT (CABG X2), LIMA (N/A)  VEIN HARVEST, GREATER SAPHENOUS (Right)  ESOPHAGEAL TRANS ECHOCARDIOGRAM (N/A)  2 Days Post-Op  ICD-10: Treatment Diagnosis:    · Generalized Muscle Weakness (M62.81)  · Difficulty in walking, Not elsewhere classified (R26.2)   Precaution/Allergies:  Adhesive and Tamsulosin      ASSESSMENT:     Mr. Cayetano Aiken presents sitting up in the chair. Agreeable to therapy. Lives with his wife and quite active prior to surgery. Still driving. His BP is a little low but the RN okayed treatment. At rest BP is 88/54. Patient is able to scoot to the edge of the recliner without assistance. Sit to stand with stand by assist.  Gait training with rolling walker x 200 feet with steady ray.   Patient is returned to the recliner and after a short rest break the patient participates in therapeutic exercises. BP at the end of the treatment 88/42. Good session  Making progress towards goal.  MD visits during treatment session. Mr. Maddison Hernandez is functioning below baseline and is therefore appropriate for skilled PT to maximize his rehab potential.  Home with HHPT at discharge. PM note:  Patient is agreeable to therapy. Daughter present. Patient is assisted to the bathroom during this treatment session and assisted with donning his underwear. Patient is able to maintain his am gait distance and performs exercises without difficulty. Good session. BP at the end of the treatment session 125/57. Patient is making progress. Will continue PT efforts. This section established at most recent assessment   PROBLEM LIST (Impairments causing functional limitations):  1. Decreased Strength  2. Decreased Transfer Abilities  3. Decreased Ambulation Ability/Technique  4. Decreased Activity Tolerance   INTERVENTIONS PLANNED: (Benefits and precautions of physical therapy have been discussed with the patient.)  1. Bed Mobility  2. Gait Training  3. Therapeutic Activites  4. Therapeutic Exercise/Strengthening  5. Transfer Training     TREATMENT PLAN: Frequency/Duration: twice daily for duration of hospital stay  Rehabilitation Potential For Stated Goals: Good     REHAB RECOMMENDATIONS (at time of discharge pending progress):    Placement: It is my opinion, based on this patient's performance to date, that Mr. Maddison Hernandez may benefit from [address] after discharge due to the functional deficits listed above that are likely to improve with skilled rehabilitation because he/she has multiple medical issues that affect his/her functional mobility in the community. Equipment:    To be determined. HISTORY:   History of Present Injury/Illness (Reason for Referral):  As above.    Past Medical History/Comorbidities:   Mr. Maddison Hernandez  has a past medical history of Arthritis, Buerger's disease ([facility] [address] CAD (coronary artery disease), Decreased mobility, Gout, Hematuria, microscopic, Hiatal hernia, Personal history of prostate cancer, Prostate cancer ([facility] [address] (~2000), and Sleep apnea. He also has no past medical history of Difficult intubation, Malignant hyperthermia due to anesthesia, Nausea & vomiting, or Pseudocholinesterase deficiency. Mr. Marzena Vanegas  has a past surgical history that includes pr appendectomy; hx other surgical; hx cyst removal (Left); hx heart catheterization (08/03/2020); hx colonoscopy; hx turp; and hx cataract removal (Bilateral). Social History/Living Environment:   Home Environment: Private residence  # Steps to Enter: 0  One/Two Story Residence: One story  Living Alone: No  Support Systems: Child(canelo)  Patient Expects to be Discharged to[de-identified] Private residence  Current DME Used/Available at Home: [place], straight(doesnt use it.)  Tub or Shower Type: Shower  Prior Level of Function/Work/Activity:  independent  Age,    Number of Personal Factors/Comorbidities that affect the Plan of Care: 1-2: MODERATE COMPLEXITY   EXAMINATION:   Most Recent Physical Functioning:   Gross Assessment:                  Posture:     Balance:  Sitting: Intact  Standing: Impaired  Standing - Static: Good  Standing - Dynamic : Fair Bed Mobility:     Wheelchair Mobility:     Transfers:  Sit to Stand: Stand-by assistance  Stand to Sit: Stand-by assistance  Gait:     Distance (ft): 200 Feet (ft)  Assistive Device: Walker, rolling  Ambulation - Level of Assistance: Stand-by assistance;Contact guard assistance  Interventions: Safety awareness training      Body Structures Involved:  1. Muscles Body Functions Affected:  1. Movement Related Activities and Participation Affected:  1.  Mobility   Number of elements that affect the Plan of Care: 3: MODERATE COMPLEXITY   CLINICAL PRESENTATION:   Presentation: Evolving clinical presentation with changing clinical characteristics: MODERATE COMPLEXITY CLINICAL DECISION MAKIN[address] [place] [address] AM-PAC 6 Clicks   Basic Mobility Inpatient Short Form  How much difficulty does the patient currently have. .. Unable A Lot A Little None   1. Turning over in bed (including adjusting bedclothes, sheets and blankets)? [] 1   [] 2   [x] 3   [] 4   2. Sitting down on and standing up from a chair with arms ( e.g., wheelchair, bedside commode, etc.)   [] 1   [] 2   [x] 3   [] 4   3. Moving from lying on back to sitting on the side of the bed? [] 1   [] 2   [x] 3   [] 4   How much help from another person does the patient currently need. .. Total A Lot A Little None   4. Moving to and from a bed to a chair (including a wheelchair)? [] 1   [] 2   [x] 3   [] 4   5. Need to walk in hospital room? [] 1   [] 2   [x] 3   [] 4   6. Climbing 3-5 steps with a railing? [] 1   [] 2   [x] 3   [] 4   © 2007, Trustees of [address] [place] [address], under license to [employer]. All rights reserved      Score:  Initial: 18 Most Recent: X (Date: -- )    Interpretation of Tool:  Represents activities that are increasingly more difficult (i.e. Bed mobility, Transfers, Gait). Medical Necessity:     · Patient is expected to demonstrate progress in   · functional technique  ·  to   · increase independence with mobility and gait. · .  Reason for Services/Other Comments:  · Patient   · continues to require present interventions due to patient's inability to function at baseline. · .   Use of outcome tool(s) and clinical judgement create a POC that gives a: Questionable prediction of patient's progress: MODERATE COMPLEXITY            TREATMENT:   (In addition to Assessment/Re-Assessment sessions the following treatments were rendered)   Pre-treatment Symptoms/Complaints:  \" Can I put on my underwear\"  Pain: Initial:   Pain Intensity 1: 0  Post Session:  0/10     Therapeutic Activity: (    13 min):   Therapeutic activities including Chair transfers, Ambulation on level ground and walker safety, posture, there ex in sitting (noted in chart below) to improve mobility, strength, balance and coordination. Required minimal Safety awareness training to promote static and dynamic balance in standing and promote coordination of bilateral, lower extremity(s). Therapeutic Exercise: ( 10 minutes):  Exercises per grid below to improve mobility, strength, balance and coordination. Required minimum  verbal cues. Progressed repetitions and complexity of movement as indicated. Date:  8/12/20 Date:   Date:     ACTIVITY/EXERCISE AM PM AM PM AM PM   Seated LAQ 15 15       Seated AP 15 15       Seated marching 15 15       Shoulder shrugs 15 15       Gluteal sets 15 15       abduction 15 15                    Braces/Orthotics/Lines/Etc:   · O2 Device: Nasal cannula  Treatment/Session Assessment:    · Response to Treatment:  Tolerated well   · Interdisciplinary Collaboration:   o Physical Therapy Assistant  o Registered Nurse  · After treatment position/precautions:   o Up in chair  o Bed/Chair-wheels locked  o Call light within reach  o RN notified  o Family at bedside   · Compliance with Program/Exercises: Compliant all of the time  · Recommendations/Intent for next treatment session: \"Next visit will focus on advancements to more challenging activities and reduction in assistance provided\".   Total Treatment Duration:  PT Patient Time In/Time Out  Time In: 1245  Time Out: [address] [facility]

## 2020-08-12 NOTE — ROUTINE PROCESS
Cardiac Rehab: Spoke with patient regarding referral to cardiac rehab. Patient meets admission criteria based on CABG x2 (08/10/20). Written information about Cardiac Rehab given and reviewed with patient. Discussed lifestyle modifications to promote cardiac wellness. Patient indicated that he may want to participate in the cardiac rehab program at the [address] [place] [address] which is closer to his home in [place], [place]. We will forward his referral to the [address] [place] [address] as requested. His Cardiologist is Dr. Gema Menon. Thank you, Michael Barry, BSN, RN Cardiopulmonary Rehabilitation Nurse Liaison [facility]

## 2020-08-12 NOTE — PROGRESS NOTES
Critical Care Daily Progress Note: 8/12/2020    [facility]order[facility]   Admission Date: 8/10/2020         The patient's chart is reviewed and the patient is discussed with the staff. This is an [de-identified] y/o male with hx of PIETER and recent abnormal stress test done because of chest pain. 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 Patient is seen at the request of Dr. Sabino Romo for respiratory management status post cardiac surgery. Patient had CABG x 2 on 8/10. Extubated that same day. Subjective:     Wearing home BIPAP overnight.    On RA, OOB to chair  Post- op D 2      Current Facility-Administered Medications   Medication Dose Route Frequency    sodium chloride (NS) flush 5-40 mL  5-40 mL IntraVENous Q8H    sodium chloride (NS) flush 5-40 mL  5-40 mL IntraVENous PRN    metoprolol tartrate (LOPRESSOR) tablet 25 mg  25 mg Oral Q12H    furosemide (LASIX) tablet 40 mg  40 mg Oral DAILY    alum-mag hydroxide-simeth (MYLANTA) oral suspension 30 mL  30 mL Oral Q4H PRN    ondansetron (ZOFRAN) injection 4 mg  4 mg IntraVENous Q6H PRN    acetaminophen (TYLENOL) tablet 650 mg  650 mg Oral Q4H PRN    atorvastatin (LIPITOR) tablet 80 mg  80 mg Oral QHS    amiodarone (CORDARONE) tablet 200 mg  200 mg Oral Q12H    aspirin delayed-release tablet 81 mg  81 mg Oral DAILY    magnesium oxide (MAG-OX) tablet 400 mg  400 mg Oral TID PRN    magnesium oxide (MAG-OX) tablet 400 mg  400 mg Oral QID PRN    potassium chloride (KLOR-CON) tablet 10 mEq  10 mEq Oral DAILY    potassium chloride (K-DUR, KLOR-CON) SR tablet 20 mEq  20 mEq Oral BID PRN    potassium chloride (K-DUR, KLOR-CON) SR tablet 40 mEq  40 mEq Oral BID PRN    traMADoL (ULTRAM) tablet 50 mg  50 mg Oral Q6H PRN    oxyCODONE-acetaminophen (PERCOCET) 5-325 mg per tablet 1 Tab  1 Tab Oral Q4H PRN    famotidine (PEPCID) tablet 20 mg  20 mg Oral BID    senna-docusate (PERICOLACE) 8.6-50 mg per tablet 2 Tab  2 Tab Oral BID    insulin lispro (HUMALOG) injection   SubCUTAneous AC&HS       Review of Systems    Constitutional:  negative for fever, chills, sweats  Cardiovascular:  negative for chest pain, palpitations, syncope, edema  Gastrointestinal:  negative for dysphagia, reflux, vomiting, diarrhea, abdominal pain, or melena  Neurologic:  negative for focal weakness, numbness, headache      Objective:     Vitals:    08/11/20 2320 08/12/20 0421 08/12/20 0635 08/12/20 0734   BP:  94/51  94/55   Pulse:  78  83   Resp:  18  16   Temp:  99.1 °F (37.3 °C)  97.2 °F (36.2 °C)   SpO2: 94% 95%  95%   Weight:   209 lb 12.8 oz (95.2 kg)          Intake/Output Summary (Last 24 hours) at 8/12/2020 4914  Last data filed at 8/12/2020 0636  Gross per 24 hour   Intake 800 ml   Output 945 ml   Net -145 ml       Physical Exam:          Constitutional:  the patient is well developed and in no acute distress  EENMT:  Sclera clear, pupils equal, oral mucosa moist  Respiratory: decreased BS   Cardiovascular:  RRR without M,G,R  Gastrointestinal: soft and non-tender; with positive bowel sounds. Musculoskeletal: warm without cyanosis. There is no lower extremity edema. Skin:  no jaundice or rashes  Neurologic: no gross neuro deficits     Psychiatric:  alert and oriented x 3      CXR:           LAB  Recent Labs     08/12/20  0625 08/11/20 2047 08/11/20  1616 08/11/20  0641 08/11/20  0520   GLUCPOC 153* 136* 141* 115* 109*      Recent Labs     08/12/20  0331 08/11/20  0314 08/10/20  2055  08/10/20  1242   WBC 11.2* 10.3  --   --  10.8   HGB 8.8* 9.6* 9.8*   < > 8.7*   HCT 28.6* 29.5* 29.7*   < > 26.7*   PLT 83* 102*  --   --  65*   INR  --   --   --   --  1.4    < > = values in this interval not displayed.      Recent Labs     08/12/20  0331 08/11/20  0314 08/10/20  2055  08/10/20  1242    144  --   --  144   K 4.9 4.5 4.0   < > 3.5   * 114*  --   --  113*   CO2 26 22  --   --  20*   * 94  --   --  145*   BUN 27* 21  --   --  23   CREA 1.75* 1.10  -- --  1.22   MG 2.3 2.5* 2.6*   < > 3.0*   CA 8.2* 8.0*  --   --  8.4    < > = values in this interval not displayed. Recent Labs     08/10/20  1943 08/10/20  1243 08/10/20  1147   PHI 7.32* 7.37 7.43   PCO2I 35.3 33.3* 36.0   PO2I 118* 115* 379*   HCO3I 18.3* 19.4* 23.6     No results for input(s): LCAD, LAC in the last 72 hours. Recent Labs     08/10/20  1943 08/10/20  1243 08/10/20  1147   PHI 7.32* 7.37 7.43   PCO2I 35.3 33.3* 36.0   PO2I 118* 115* 379*   HCO3I 18.3* 19.4* 23.6             Assessment:  (Medical Decision Making)     Hospital Problems  Date Reviewed: 8/12/2020          Codes Class Noted POA    Central sleep apnea (Chronic) ICD-10-CM: G47.31  ICD-9-CM: 780.57  8/11/2020 Yes        CAD (coronary artery disease) (Chronic) ICD-10-CM: I25.10  ICD-9-CM: 414.00  8/10/2020 Yes        Atherosclerosis of native coronary artery of native heart with unstable angina pectoris (HCC) (Chronic) ICD-10-CM: I25.110  ICD-9-CM: 414.01, 411.1  8/10/2020 Yes        S/P CABG x 2 ICD-10-CM: Z95.1  ICD-9-CM: V45.81  8/10/2020 Yes            S/P CABG, on RA. Using home BiPAP. Plan:  (Medical Decision Making)     Home BIPAP with sleep. Mobilize, IS Q 2 h    Respiratory status stable on RA. No further respiratory needs noted at this time. Will sign off of the treatment team.  Please call if we can be of further assistance. Thank you. More than 50% of the time documented was spent in face-to-face contact with the patient and in the care of the patient on the floor/unit where the patient is located. Leslie Fernández, NP    Lungs:  Minimal B crackles  Heart:  RRR with no Murmur/Rubs/Gallops    Additional Comments:    Patient on RA. Using home bipap at night. COntinue this regimen. May need to hold on lasix with BP low and creatinine starting to rise. Will sign off for now but let us know if new issues arise. I have spoken with and examined the patient.  I agree with the above assessment and plan as documented.     Valorie Gupta MD

## 2020-08-12 NOTE — PROGRESS NOTES
Bedside shift change report given to Kelley Lora (oncoming nurse) by Kisha Chris RN (offgoing nurse). Report included the following information SBAR, Kardex, OR Summary, Intake/Output, MAR, Recent Results and Cardiac Rhythm NSR.

## 2020-08-12 NOTE — PROGRESS NOTES
Problem: Mobility Impaired (Adult and Pediatric)  Goal: *Acute Goals and Plan of Care (Insert Text)  Outcome: Progressing Towards Goal  Note:   1. Mr. Norberto Fonseca will perform supine to sit and sit to supine with bed flat and no rails independently in 7 days. 2.  Mr. Norberto Fonseca will perform sit to stand and bed to chair with least restrictive device independently in 7 days. 3.  Mr. Norberto Fonseca will perform gait with least restrictive device >500 ft independently in 7 days. 4.  Mr. Norberto Fonseca will perform therex to bilateral lower extremities x 25 reps in sitting and standing in 7 days. PHYSICAL THERAPY: Daily Note and AM 8/12/2020  INPATIENT: PT Visit Days : 2  [place] / [place] / Product Type: Medicare /       NAME/AGE/GENDER: Swapna Mace is a [de-identified] y.o. male   PRIMARY DIAGNOSIS: Atherosclerosis of native coronary artery of native heart with unstable angina pectoris ([facility]) [I25.110]  CAD (coronary artery disease) [I25.10]  Atherosclerosis of native coronary artery of native heart with unstable angina pectoris ([facility] [I25.110] <principal problem not specified> <principal problem not specified>  Procedure(s) (LRB):  CORONARY ARTERY BYPASS GRAFT (CABG X2), LIMA (N/A)  VEIN HARVEST, GREATER SAPHENOUS (Right)  ESOPHAGEAL TRANS ECHOCARDIOGRAM (N/A)  2 Days Post-Op  ICD-10: Treatment Diagnosis:    · Generalized Muscle Weakness (M62.81)  · Difficulty in walking, Not elsewhere classified (R26.2)   Precaution/Allergies:  Adhesive and Tamsulosin      ASSESSMENT:     Mr. Norberto Fonseca presents sitting up in the chair. Agreeable to therapy. Lives with his wife and quite active prior to surgery. Still driving. His BP is a little low but the RN okayed treatment. At rest BP is 88/54. Patient is able to scoot to the edge of the recliner without assistance. Sit to stand with stand by assist.  Gait training with rolling walker x 200 feet with steady ray.   Patient is returned to the recliner and after a short rest break the patient participates in therapeutic exercises. BP at the end of the treatment 88/42. Good session  Making progress towards goal.  MD visits during treatment session. Mr. Facundo Means is functioning below baseline and is therefore appropriate for skilled PT to maximize his rehab potential.  Home with HHPT at discharge. This section established at most recent assessment   PROBLEM LIST (Impairments causing functional limitations):  1. Decreased Strength  2. Decreased Transfer Abilities  3. Decreased Ambulation Ability/Technique  4. Decreased Activity Tolerance   INTERVENTIONS PLANNED: (Benefits and precautions of physical therapy have been discussed with the patient.)  1. Bed Mobility  2. Gait Training  3. Therapeutic Activites  4. Therapeutic Exercise/Strengthening  5. Transfer Training     TREATMENT PLAN: Frequency/Duration: twice daily for duration of hospital stay  Rehabilitation Potential For Stated Goals: Good     REHAB RECOMMENDATIONS (at time of discharge pending progress):    Placement: It is my opinion, based on this patient's performance to date, that Mr. Facundo Means may benefit from [address] after discharge due to the functional deficits listed above that are likely to improve with skilled rehabilitation because he/she has multiple medical issues that affect his/her functional mobility in the community. Equipment:    To be determined. HISTORY:   History of Present Injury/Illness (Reason for Referral):  As above. Past Medical History/Comorbidities:   Mr. Facundo Means  has a past medical history of Arthritis, Buerger's disease ([address] CAD (coronary artery disease), Decreased mobility, Gout, Hematuria, microscopic, Hiatal hernia, Personal history of prostate cancer, Prostate cancer ([address] (~2000), and Sleep apnea.  He also has no past medical history of Difficult intubation, Malignant hyperthermia due to anesthesia, Nausea & vomiting, or Pseudocholinesterase deficiency. Mr. Gato Calhoun  has a past surgical history that includes pr appendectomy; hx other surgical; hx cyst removal (Left); hx heart catheterization (2020); hx colonoscopy; hx turp; and hx cataract removal (Bilateral). Social History/Living Environment:   Home Environment: Private residence  # Steps to Enter: 0  One/Two Story Residence: One story  Living Alone: No  Support Systems: Child(canelo)  Patient Expects to be Discharged to[de-identified] Private residence  Current DME Used/Available at Home: [employer], straight(doesnt use it.)  Tub or Shower Type: Shower  Prior Level of Function/Work/Activity:  independent  Age,    Number of Personal Factors/Comorbidities that affect the Plan of Care: 1-2: MODERATE COMPLEXITY   EXAMINATION:   Most Recent Physical Functioning:   Gross Assessment:                  Posture:     Balance:  Sitting: Intact  Standing: Impaired  Standing - Static: Good  Standing - Dynamic : Fair Bed Mobility:     Wheelchair Mobility:     Transfers:  Sit to Stand: Stand-by assistance  Stand to Sit: Stand-by assistance  Gait:     Distance (ft): 200 Feet (ft)  Assistive Device: Gait belt;Walker, rolling  Ambulation - Level of Assistance: Contact guard assistance  Interventions: Safety awareness training      Body Structures Involved:  1. Muscles Body Functions Affected:  1. Movement Related Activities and Participation Affected:  1. Mobility   Number of elements that affect the Plan of Care: 3: MODERATE COMPLEXITY   CLINICAL PRESENTATION:   Presentation: Evolving clinical presentation with changing clinical characteristics: MODERATE COMPLEXITY   CLINICAL DECISION MAKIN [facility] Mobility Inpatient Short Form  How much difficulty does the patient currently have. .. Unable A Lot A Little None   1. Turning over in bed (including adjusting bedclothes, sheets and blankets)? [] 1   [] 2   [x] 3   [] 4   2.   Sitting down on and standing up from a chair with arms ( e.g., wheelchair, bedside commode, etc.)   [] 1   [] 2   [x] 3   [] 4   3. Moving from lying on back to sitting on the side of the bed? [] 1   [] 2   [x] 3   [] 4   How much help from another person does the patient currently need. .. Total A Lot A Little None   4. Moving to and from a bed to a chair (including a wheelchair)? [] 1   [] 2   [x] 3   [] 4   5. Need to walk in hospital room? [] 1   [] 2   [x] 3   [] 4   6. Climbing 3-5 steps with a railing? [] 1   [] 2   [x] 3   [] 4   © 2007, Trustees of [address] [place] [address] [address] [address], under license to [employer]. All rights reserved      Score:  Initial: 18 Most Recent: X (Date: -- )    Interpretation of Tool:  Represents activities that are increasingly more difficult (i.e. Bed mobility, Transfers, Gait). Medical Necessity:     · Patient is expected to demonstrate progress in   · functional technique  ·  to   · increase independence with mobility and gait. · .  Reason for Services/Other Comments:  · Patient   · continues to require present interventions due to patient's inability to function at baseline. · .   Use of outcome tool(s) and clinical judgement create a POC that gives a: Questionable prediction of patient's progress: MODERATE COMPLEXITY            TREATMENT:   (In addition to Assessment/Re-Assessment sessions the following treatments were rendered)   Pre-treatment Symptoms/Complaints:  \"Good morning\"  Pain: Initial:   Pain Intensity 1: 0  Post Session:  0/10     Therapeutic Activity: (    13 min): Therapeutic activities including Chair transfers, Ambulation on level ground and walker safety, posture, there ex in sitting (noted in chart below) to improve mobility, strength, balance and coordination. Required minimal Safety awareness training to promote static and dynamic balance in standing and promote coordination of bilateral, lower extremity(s).    Therapeutic Exercise: ( 12 minutes):  Exercises per grid below to improve mobility, strength, balance and coordination. Required minimum  verbal cues. Progressed repetitions and complexity of movement as indicated. Date:  8/12/20 Date:   Date:     ACTIVITY/EXERCISE AM PM AM PM AM PM   Seated LAQ 15        Seated AP 15        Seated marching 15        Shoulder shrugs 15        Gluteal sets 15        abduction 15                     Braces/Orthotics/Lines/Etc:   · O2 Device: Nasal cannula  Treatment/Session Assessment:    · Response to Treatment:  Tolerated well   · Interdisciplinary Collaboration:   o Physical Therapist  o Registered Nurse  · After treatment position/precautions:   o Up in chair  o Bed/Chair-wheels locked  o Call light within reach  o RN notified   · Compliance with Program/Exercises: Compliant all of the time  · Recommendations/Intent for next treatment session: \"Next visit will focus on advancements to more challenging activities and reduction in assistance provided\".   Total Treatment Duration:  PT Patient Time In/Time Out  Time In: 0901  Time Out: 0926    Oliver Baker PTA

## 2020-08-12 NOTE — PROGRESS NOTES
Bedside shift change report given to Cici Hazel RN (oncoming nurse) by Linda Powell RN (offgoing nurse). Report included the following information SBAR, Kardex, OR Summary, Intake/Output, MAR, Recent Results and Cardiac Rhythm NSR.

## 2020-08-13 PROBLEM — D62 ACUTE BLOOD LOSS ANEMIA: Status: ACTIVE | Noted: 2020-08-13

## 2020-08-13 LAB
ABO + RH BLD: NORMAL
ANION GAP SERPL CALC-SCNC: 6 MMOL/L (ref 7–16)
BLD PROD TYP BPU: NORMAL
BLOOD GROUP ANTIBODIES SERPL: NORMAL
BPU ID: NORMAL
BUN SERPL-MCNC: 34 MG/DL (ref 8–23)
CALCIUM SERPL-MCNC: 8 MG/DL (ref 8.3–10.4)
CHLORIDE SERPL-SCNC: 109 MMOL/L (ref 98–107)
CO2 SERPL-SCNC: 26 MMOL/L (ref 21–32)
CREAT SERPL-MCNC: 1.6 MG/DL (ref 0.8–1.5)
CROSSMATCH RESULT,%XM: NORMAL
ERYTHROCYTE [DISTWIDTH] IN BLOOD BY AUTOMATED COUNT: 13.6 % (ref 11.9–14.6)
GLUCOSE BLD STRIP.AUTO-MCNC: 129 MG/DL (ref 65–100)
GLUCOSE BLD STRIP.AUTO-MCNC: 129 MG/DL (ref 65–100)
GLUCOSE BLD STRIP.AUTO-MCNC: 138 MG/DL (ref 65–100)
GLUCOSE BLD STRIP.AUTO-MCNC: 152 MG/DL (ref 65–100)
GLUCOSE SERPL-MCNC: 110 MG/DL (ref 65–100)
HCT VFR BLD AUTO: 24.6 % (ref 41.1–50.3)
HGB BLD-MCNC: 7.9 G/DL (ref 13.6–17.2)
MCH RBC QN AUTO: 30.4 PG (ref 26.1–32.9)
MCHC RBC AUTO-ENTMCNC: 32.1 G/DL (ref 31.4–35)
MCV RBC AUTO: 94.6 FL (ref 79.6–97.8)
MM INDURATION POC: 0 MM (ref 0–5)
NRBC # BLD: 0 K/UL (ref 0–0.2)
PLATELET # BLD AUTO: 80 K/UL (ref 150–450)
PMV BLD AUTO: 12.9 FL (ref 9.4–12.3)
POTASSIUM SERPL-SCNC: 3.8 MMOL/L (ref 3.5–5.1)
PPD POC: NEGATIVE NEGATIVE
RBC # BLD AUTO: 2.6 M/UL (ref 4.23–5.6)
SODIUM SERPL-SCNC: 141 MMOL/L (ref 136–145)
SPECIMEN EXP DATE BLD: NORMAL
STATUS OF UNIT,%ST: NORMAL
UNIT DIVISION, %UDIV: 0
WBC # BLD AUTO: 7.6 K/UL (ref 4.3–11.1)

## 2020-08-13 PROCEDURE — 74011636637 HC RX REV CODE- 636/637: Performed by: THORACIC SURGERY (CARDIOTHORACIC VASCULAR SURGERY)

## 2020-08-13 PROCEDURE — 80048 BASIC METABOLIC PNL TOTAL CA: CPT

## 2020-08-13 PROCEDURE — 85027 COMPLETE CBC AUTOMATED: CPT

## 2020-08-13 PROCEDURE — 74011250637 HC RX REV CODE- 250/637: Performed by: PHYSICIAN ASSISTANT

## 2020-08-13 PROCEDURE — 74011250637 HC RX REV CODE- 250/637: Performed by: THORACIC SURGERY (CARDIOTHORACIC VASCULAR SURGERY)

## 2020-08-13 PROCEDURE — 97530 THERAPEUTIC ACTIVITIES: CPT

## 2020-08-13 PROCEDURE — 65660000004 HC RM CVT STEPDOWN

## 2020-08-13 PROCEDURE — 36415 COLL VENOUS BLD VENIPUNCTURE: CPT

## 2020-08-13 PROCEDURE — 82962 GLUCOSE BLOOD TEST: CPT

## 2020-08-13 PROCEDURE — 77030012890

## 2020-08-13 PROCEDURE — 97110 THERAPEUTIC EXERCISES: CPT

## 2020-08-13 RX ORDER — BETAMETHASONE DIPROPIONATE 0.5 MG/G
CREAM TOPICAL 2 TIMES DAILY
Status: DISCONTINUED | OUTPATIENT
Start: 2020-08-13 | End: 2020-08-15

## 2020-08-13 RX ORDER — OXYBUTYNIN CHLORIDE 5 MG/1
5 TABLET ORAL DAILY
Status: DISCONTINUED | OUTPATIENT
Start: 2020-08-13 | End: 2020-08-18 | Stop reason: HOSPADM

## 2020-08-13 RX ORDER — OXYBUTYNIN CHLORIDE 5 MG/1
5 TABLET ORAL DAILY
Status: DISCONTINUED | OUTPATIENT
Start: 2020-08-14 | End: 2020-08-13

## 2020-08-13 RX ADMIN — Medication 10 ML: at 06:57

## 2020-08-13 RX ADMIN — Medication 10 ML: at 21:01

## 2020-08-13 RX ADMIN — SENNOSIDES AND DOCUSATE SODIUM 2 TABLET: 8.6; 5 TABLET ORAL at 09:23

## 2020-08-13 RX ADMIN — POTASSIUM CHLORIDE 20 MEQ: 20 TABLET, EXTENDED RELEASE ORAL at 06:56

## 2020-08-13 RX ADMIN — SENNOSIDES AND DOCUSATE SODIUM 2 TABLET: 8.6; 5 TABLET ORAL at 20:58

## 2020-08-13 RX ADMIN — AMIODARONE HYDROCHLORIDE 200 MG: 200 TABLET ORAL at 09:24

## 2020-08-13 RX ADMIN — INSULIN LISPRO 2 UNITS: 100 INJECTION, SOLUTION INTRAVENOUS; SUBCUTANEOUS at 16:38

## 2020-08-13 RX ADMIN — Medication 10 ML: at 15:38

## 2020-08-13 RX ADMIN — FAMOTIDINE 20 MG: 20 TABLET, FILM COATED ORAL at 09:23

## 2020-08-13 RX ADMIN — OXYBUTYNIN CHLORIDE 5 MG: 5 TABLET ORAL at 17:45

## 2020-08-13 RX ADMIN — ATORVASTATIN CALCIUM 40 MG: 40 TABLET, FILM COATED ORAL at 21:00

## 2020-08-13 RX ADMIN — POTASSIUM CHLORIDE 20 MEQ: 20 TABLET, EXTENDED RELEASE ORAL at 17:45

## 2020-08-13 RX ADMIN — AMIODARONE HYDROCHLORIDE 200 MG: 200 TABLET ORAL at 20:58

## 2020-08-13 RX ADMIN — BETAMETHASONE DIPROPIONATE: 0.5 CREAM TOPICAL at 17:47

## 2020-08-13 NOTE — PROGRESS NOTES
Problem: Mobility Impaired (Adult and Pediatric)  Goal: *Acute Goals and Plan of Care (Insert Text)  Outcome: Progressing Towards Goal  Note:   1. Mr. Sukumar Monsivais will perform supine to sit and sit to supine with bed flat and no rails independently in 7 days. 2.  Mr. Sukumar Monsivais will perform sit to stand and bed to chair with least restrictive device independently in 7 days. 3.  Mr. Sukumar Monsivais will perform gait with least restrictive device >500 ft independently in 7 days. 4.  Mr. Sukumar Monsivais will perform therex to bilateral lower extremities x 25 reps in sitting and standing in 7 days. PHYSICAL THERAPY: Daily Note and AM 8/13/2020  INPATIENT: PT Visit Days : 3  [place] / Product Type: Medicare /       NAME/AGE/GENDER: Syl Albert is a [de-identified] y.o. male   PRIMARY DIAGNOSIS: Atherosclerosis of native coronary artery of native heart with unstable angina pectoris ([facility] [address] [I25.110]  CAD (coronary artery disease) [I25.10]  Atherosclerosis of native coronary artery of native heart with unstable angina pectoris ([facility]) [I25.110] S/P CABG x 2 S/P CABG x 2  Procedure(s) (LRB):  CORONARY ARTERY BYPASS GRAFT (CABG X2), LIMA (N/A)  VEIN HARVEST, GREATER SAPHENOUS (Right)  ESOPHAGEAL TRANS ECHOCARDIOGRAM (N/A)  3 Days Post-Op  ICD-10: Treatment Diagnosis:    · Generalized Muscle Weakness (M62.81)  · Difficulty in walking, Not elsewhere classified (R26.2)   Precaution/Allergies:  Adhesive and Tamsulosin      ASSESSMENT:     Mr. Sukumar Monsivais presents sitting up in the chair. Agreeable to therapy. Daughter present. Lives with his wife and quite active prior to surgery. Still driving. Patient is able to scoot to the edge of the recliner without assistance. Sit to stand with stand by assist.  Gait training with rolling walker x 200 feet with steady ray. Patient is returned to the recliner and after a short rest break the patient participates in therapeutic exercises.    Good session  Making progress towards goal.  Patient is very pleasant and cooperative. Mr. Sharon Nino is functioning below baseline and is therefore appropriate for skilled PT to maximize his rehab potential.  Home with HHPT at discharge. This section established at most recent assessment   PROBLEM LIST (Impairments causing functional limitations):  1. Decreased Strength  2. Decreased Transfer Abilities  3. Decreased Ambulation Ability/Technique  4. Decreased Activity Tolerance   INTERVENTIONS PLANNED: (Benefits and precautions of physical therapy have been discussed with the patient.)  1. Bed Mobility  2. Gait Training  3. Therapeutic Activites  4. Therapeutic Exercise/Strengthening  5. Transfer Training     TREATMENT PLAN: Frequency/Duration: twice daily for duration of hospital stay  Rehabilitation Potential For Stated Goals: Good     REHAB RECOMMENDATIONS (at time of discharge pending progress):    Placement: It is my opinion, based on this patient's performance to date, that Mr. Sharon Nino may benefit from [address] after discharge due to the functional deficits listed above that are likely to improve with skilled rehabilitation because he/she has multiple medical issues that affect his/her functional mobility in the community. Equipment:    To be determined. HISTORY:   History of Present Injury/Illness (Reason for Referral):  As above. Past Medical History/Comorbidities:   Mr. Sharon Nino  has a past medical history of Arthritis, Buerger's disease ([address] CAD (coronary artery disease), Decreased mobility, Gout, Hematuria, microscopic, Hiatal hernia, Personal history of prostate cancer, Prostate cancer ([address] (~2000), and Sleep apnea. He also has no past medical history of Difficult intubation, Malignant hyperthermia due to anesthesia, Nausea & vomiting, or Pseudocholinesterase deficiency.   Mr. Sharon Nino  has a past surgical history that includes pr appendectomy; hx other surgical; hx cyst removal (Left); hx heart catheterization (2020); hx colonoscopy; hx turp; and hx cataract removal (Bilateral). Social History/Living Environment:   Home Environment: Private residence  # Steps to Enter: 0  One/Two Story Residence: One story  Living Alone: No  Support Systems: Child(canelo)  Patient Expects to be Discharged to[de-identified] Private residence  Current DME Used/Available at Home: [address] [facility], straight(doesnt use it.)  Tub or Shower Type: Shower  Prior Level of Function/Work/Activity:  independent  Age,    Number of Personal Factors/Comorbidities that affect the Plan of Care: 1-2: MODERATE COMPLEXITY   EXAMINATION:   Most Recent Physical Functioning:   Gross Assessment:                  Posture:     Balance:  Sitting: Intact  Standing: Impaired  Standing - Static: Good  Standing - Dynamic : Fair Bed Mobility:     Wheelchair Mobility:     Transfers:  Sit to Stand: Stand-by assistance  Stand to Sit: Stand-by assistance  Gait:     Distance (ft): 200 Feet (ft)  Assistive Device: Walker, rolling  Ambulation - Level of Assistance: Stand-by assistance;Contact guard assistance  Interventions: Safety awareness training      Body Structures Involved:  1. Muscles Body Functions Affected:  1. Movement Related Activities and Participation Affected:  1. Mobility   Number of elements that affect the Plan of Care: 3: MODERATE COMPLEXITY   CLINICAL PRESENTATION:   Presentation: Evolving clinical presentation with changing clinical characteristics: MODERATE COMPLEXITY   CLINICAL DECISION MAKIN [facility] Mobility Inpatient Short Form  How much difficulty does the patient currently have. .. Unable A Lot A Little None   1. Turning over in bed (including adjusting bedclothes, sheets and blankets)? [] 1   [] 2   [x] 3   [] 4   2. Sitting down on and standing up from a chair with arms ( e.g., wheelchair, bedside commode, etc.)   [] 1   [] 2   [x] 3   [] 4   3.   Moving from lying on back to sitting on the side of the bed? [] 1   [] 2   [x] 3   [] 4   How much help from another person does the patient currently need. .. Total A Lot A Little None   4. Moving to and from a bed to a chair (including a wheelchair)? [] 1   [] 2   [x] 3   [] 4   5. Need to walk in hospital room? [] 1   [] 2   [x] 3   [] 4   6. Climbing 3-5 steps with a railing? [] 1   [] 2   [x] 3   [] 4   © 2007, Trustees of [address] [place] [address] [address] [address], under license to [employer]. All rights reserved      Score:  Initial: 18 Most Recent: X (Date: -- )    Interpretation of Tool:  Represents activities that are increasingly more difficult (i.e. Bed mobility, Transfers, Gait). Medical Necessity:     · Patient is expected to demonstrate progress in   · functional technique  ·  to   · increase independence with mobility and gait. · .  Reason for Services/Other Comments:  · Patient   · continues to require present interventions due to patient's inability to function at baseline. · .   Use of outcome tool(s) and clinical judgement create a POC that gives a: Questionable prediction of patient's progress: MODERATE COMPLEXITY            TREATMENT:   (In addition to Assessment/Re-Assessment sessions the following treatments were rendered)   Pre-treatment Symptoms/Complaints:  \"Good morning\"  Pain: Initial:   Pain Intensity 1: 0  Post Session:  0/10     Therapeutic Activity: (    13 min): Therapeutic activities including Chair transfers, Ambulation on level ground and walker safety, posture, there ex in sitting (noted in chart below) to improve mobility, strength, balance and coordination. Required minimal Safety awareness training to promote static and dynamic balance in standing and promote coordination of bilateral, lower extremity(s). Therapeutic Exercise: ( 11 minutes):  Exercises per grid below to improve mobility, strength, balance and coordination. Required minimum  verbal cues.   Progressed repetitions and complexity of movement as indicated. Date:  8/13/20 Date:   Date:     ACTIVITY/EXERCISE AM PM AM PM AM PM   Seated LAQ 20        Seated AP 20        Seated marching 20        Shoulder shrugs 20        Gluteal sets 20        abduction 20                     Braces/Orthotics/Lines/Etc:   ·   Treatment/Session Assessment:    · Response to Treatment:  Tolerated well   · Interdisciplinary Collaboration:   o Physical Therapy Assistant  o Registered Nurse  · After treatment position/precautions:   o Up in chair  o Bed/Chair-wheels locked  o Call light within reach  o RN notified  o Family at bedside   · Compliance with Program/Exercises: Compliant all of the time  · Recommendations/Intent for next treatment session: \"Next visit will focus on advancements to more challenging activities and reduction in assistance provided\".   Total Treatment Duration:  PT Patient Time In/Time Out  Time In: 1045  Time Out: 1109    Shawna Hernandez-Eddi, PTA

## 2020-08-13 NOTE — PROGRESS NOTES
Bedside shift change report given to Cici Hazel RN (oncoming nurse) by Gabriel Maynard (offgoing nurse). Report included the following information SBAR, Kardex, Intake/Output, MAR, Recent Results and Cardiac Rhythm NSR, PAC's.

## 2020-08-13 NOTE — PROGRESS NOTES
Care Management Interventions  PCP Verified by CM: Yes(Trell Katina)  Mode of Transport at Discharge: Other (see comment)(spouse)  Transition of Care Consult (CM Consult): Discharge Planning, [address]: No  Discharge Durable Medical Equipment: No  Physical Therapy Consult: Yes  Occupational Therapy Consult: No  Speech Therapy Consult: No  Current Support Network: Own Home, Lives with Spouse  Confirm Follow Up Transport: Family  The Plan for Transition of Care is Related to the Following Treatment Goals : home with home health   The Patient and/or Patient Representative was Provided with a Choice of Provider and Agrees with the Discharge Plan?: Yes  Name of the Patient Representative Who was Provided with a Choice of Provider and Agrees with the Discharge Plan: Mr. Destiny Estrada of Choice List was Provided with Basic Dialogue that Supports the Patient's Individualized Plan of Care/Goals, Treatment Preferences and Shares the Quality Data Associated with the Providers?: Yes   Resource Information Provided?: No  Discharge Location  Discharge Placement: Home with home health      This CM spoke with pt and daughter this day to complete assessment. Pt verified his PCP, insurance, emergency contact, and home address. He reports no difficulty obtaining his medications in the community. He lives at home with spouse and son with no steps to enter. His home DME walker and walking stick as well as a BIPAP serviced by [employer] in [place], [place]. He confirms that at baseline prior to admission he is independent with his ADLs including bathing, dressing, and ambulating. We discussed discharge plan this day. Per pt, plan is for him to return home with spouse and support of children when stable for discharge.   We discussed the role and recommendation of home health at discharge - he is agreeable to referral.  He reviewed [facility] agencies and is agreeable to [facility] referral.  Referral made this day. No additional CM needs at this time. Will continue to follow and update as needed.

## 2020-08-13 NOTE — PROGRESS NOTES
Problem: Mobility Impaired (Adult and Pediatric)  Goal: *Acute Goals and Plan of Care (Insert Text)  Outcome: Progressing Towards Goal  Note:   1. Mr. Jorge Avalos will perform supine to sit and sit to supine with bed flat and no rails independently in 7 days. 2.  Mr. Jorge Avalos will perform sit to stand and bed to chair with least restrictive device independently in 7 days. 3.  Mr. Jorge Avalos will perform gait with least restrictive device >500 ft independently in 7 days. 4.  Mr. Jorge Avalos will perform therex to bilateral lower extremities x 25 reps in sitting and standing in 7 days. PHYSICAL THERAPY: Daily Note and PM 8/13/2020  INPATIENT: PT Visit Days : 3  [place] / [place] / Product Type: Medicare /       NAME/AGE/GENDER: Phan Burt is a [de-identified] y.o. male   PRIMARY DIAGNOSIS: Atherosclerosis of native coronary artery of native heart with unstable angina pectoris ([facility] [address] [I25.110]  CAD (coronary artery disease) [I25.10]  Atherosclerosis of native coronary artery of native heart with unstable angina pectoris (HCC) [I25.110] S/P CABG x 2 S/P CABG x 2  Procedure(s) (LRB):  CORONARY ARTERY BYPASS GRAFT (CABG X2), LIMA (N/A)  VEIN HARVEST, GREATER SAPHENOUS (Right)  ESOPHAGEAL TRANS ECHOCARDIOGRAM (N/A)  3 Days Post-Op  ICD-10: Treatment Diagnosis:    · Generalized Muscle Weakness (M62.81)  · Difficulty in walking, Not elsewhere classified (R26.2)   Precaution/Allergies:  Adhesive and Tamsulosin      ASSESSMENT:     Mr. Jorge Avalos presents sitting up in the chair. Agreeable to therapy. Daughter present. Lives with his wife and quite active prior to surgery. Still driving. Patient is able to scoot to the edge of the recliner without assistance. Sit to stand with stand by assist.  Gait training with rolling walker x 200 feet with steady ray. Patient is returned to the recliner and after a short rest break the patient participates in therapeutic exercises.    Good session  Making progress towards goal.  Patient is very pleasant and cooperative. Mr. Sukumar Monsivais is functioning below baseline and is therefore appropriate for skilled PT to maximize his rehab potential.  Home with HHPT at discharge. PM note:  Patient is agreeable to therapy. Daughter is at bedside. Patient is able to maintain am gait distance and participate in exercises. Patient is left in the recliner with needs within reach. Good session. Making progress towards goals. Discharge home with [address]. This section established at most recent assessment   PROBLEM LIST (Impairments causing functional limitations):  1. Decreased Strength  2. Decreased Transfer Abilities  3. Decreased Ambulation Ability/Technique  4. Decreased Activity Tolerance   INTERVENTIONS PLANNED: (Benefits and precautions of physical therapy have been discussed with the patient.)  1. Bed Mobility  2. Gait Training  3. Therapeutic Activites  4. Therapeutic Exercise/Strengthening  5. Transfer Training     TREATMENT PLAN: Frequency/Duration: twice daily for duration of hospital stay  Rehabilitation Potential For Stated Goals: Good     REHAB RECOMMENDATIONS (at time of discharge pending progress):    Placement: It is my opinion, based on this patient's performance to date, that Mr. Sukumar Monsivais may benefit from [address] after discharge due to the functional deficits listed above that are likely to improve with skilled rehabilitation because he/she has multiple medical issues that affect his/her functional mobility in the community. Equipment:    To be determined. HISTORY:   History of Present Injury/Illness (Reason for Referral):  As above.    Past Medical History/Comorbidities:   Mr. Sukumar Monsivais  has a past medical history of Arthritis, Buerger's disease ([facility] [address] CAD (coronary artery disease), Decreased mobility, Gout, Hematuria, microscopic, Hiatal hernia, Personal history of prostate cancer, Prostate cancer ([facility] [address] (~), and Sleep apnea. He also has no past medical history of Difficult intubation, Malignant hyperthermia due to anesthesia, Nausea & vomiting, or Pseudocholinesterase deficiency. Mr. Rossi Blakely  has a past surgical history that includes pr appendectomy; hx other surgical; hx cyst removal (Left); hx heart catheterization (2020); hx colonoscopy; hx turp; and hx cataract removal (Bilateral). Social History/Living Environment:   Home Environment: Private residence  # Steps to Enter: 0  One/Two Story Residence: One story  Living Alone: No  Support Systems: Child(canelo)  Patient Expects to be Discharged to[de-identified] Private residence  Current DME Used/Available at Home: jose Tejada(doesnt use it.)  Tub or Shower Type: Shower  Prior Level of Function/Work/Activity:  independent  Age,    Number of Personal Factors/Comorbidities that affect the Plan of Care: 1-2: MODERATE COMPLEXITY   EXAMINATION:   Most Recent Physical Functioning:   Gross Assessment:                  Posture:     Balance:  Sitting: Intact  Standing: Impaired  Standing - Static: Good  Standing - Dynamic : Fair Bed Mobility:     Wheelchair Mobility:     Transfers:  Sit to Stand: Stand-by assistance  Stand to Sit: Stand-by assistance  Gait:     Distance (ft): 200 Feet (ft)  Assistive Device: Walker, rolling  Ambulation - Level of Assistance: Stand-by assistance;Contact guard assistance  Interventions: Safety awareness training      Body Structures Involved:  1. Muscles Body Functions Affected:  1. Movement Related Activities and Participation Affected:  1. Mobility   Number of elements that affect the Plan of Care: 3: MODERATE COMPLEXITY   CLINICAL PRESENTATION:   Presentation: Evolving clinical presentation with changing clinical characteristics: MODERATE COMPLEXITY   CLINICAL DECISION MAKIN [facility] Mobility Inpatient Short Form  How much difficulty does the patient currently have. ..  Unable A Lot A Little None   1. Turning over in bed (including adjusting bedclothes, sheets and blankets)? [] 1   [] 2   [x] 3   [] 4   2. Sitting down on and standing up from a chair with arms ( e.g., wheelchair, bedside commode, etc.)   [] 1   [] 2   [x] 3   [] 4   3. Moving from lying on back to sitting on the side of the bed? [] 1   [] 2   [x] 3   [] 4   How much help from another person does the patient currently need. .. Total A Lot A Little None   4. Moving to and from a bed to a chair (including a wheelchair)? [] 1   [] 2   [x] 3   [] 4   5. Need to walk in hospital room? [] 1   [] 2   [x] 3   [] 4   6. Climbing 3-5 steps with a railing? [] 1   [] 2   [x] 3   [] 4   © 2007, Trustees of [address] [place] [address], under license to [employer]. All rights reserved      Score:  Initial: 18 Most Recent: X (Date: -- )    Interpretation of Tool:  Represents activities that are increasingly more difficult (i.e. Bed mobility, Transfers, Gait). Medical Necessity:     · Patient is expected to demonstrate progress in   · functional technique  ·  to   · increase independence with mobility and gait. · .  Reason for Services/Other Comments:  · Patient   · continues to require present interventions due to patient's inability to function at baseline. · .   Use of outcome tool(s) and clinical judgement create a POC that gives a: Questionable prediction of patient's progress: MODERATE COMPLEXITY            TREATMENT:   (In addition to Assessment/Re-Assessment sessions the following treatments were rendered)   Pre-treatment Symptoms/Complaints: \"Hello\"  Pain: Initial:   Pain Intensity 1: 0  Post Session:  0/10     Therapeutic Activity: (    13 min): Therapeutic activities including Chair transfers, Ambulation on level ground and walker safety, posture, there ex in sitting (noted in chart below) to improve mobility, strength, balance and coordination.   Required minimal Safety awareness training to promote static and dynamic balance in standing and promote coordination of bilateral, lower extremity(s). Therapeutic Exercise: ( 12 minutes):  Exercises per grid below to improve mobility, strength, balance and coordination. Required minimum  verbal cues. Progressed repetitions and complexity of movement as indicated. Date:  8/13/20 Date:   Date:     ACTIVITY/EXERCISE AM PM AM PM AM PM   Seated LAQ 20 20       Seated AP 20 20       Seated marching 20 20       Shoulder shrugs 20 20       Gluteal sets 20 20       abduction 20 20                    Braces/Orthotics/Lines/Etc:   ·   Treatment/Session Assessment:    · Response to Treatment:  Tolerated well   · Interdisciplinary Collaboration:   o Physical Therapy Assistant  o Registered Nurse  · After treatment position/precautions:   o Up in chair  o Bed/Chair-wheels locked  o Call light within reach  o RN notified  o Family at bedside   · Compliance with Program/Exercises: Compliant all of the time  · Recommendations/Intent for next treatment session: \"Next visit will focus on advancements to more challenging activities and reduction in assistance provided\".   Total Treatment Duration:  PT Patient Time In/Time Out  Time In: 1340  Time Out: 1404    Shawna Jimenez, PTA

## 2020-08-13 NOTE — PROGRESS NOTES
Bedside and Verbal shift change report given to self (oncoming nurse) by Emily Godoy (offgoing nurse). Report included the following information SBAR and Kardex.

## 2020-08-13 NOTE — PROGRESS NOTES
Verbal bedside report given to oncoming RNTay. Patient's situation, background, assessment and recommendations provided. Opportunity for questions provided. Oncoming RN assumed care of patient.

## 2020-08-13 NOTE — PROGRESS NOTES
[facility] requested assessment; stated patient's daughter requested help with patient's mask and pressure. She complained of frequent leaks, excessive pressure, and jaw pain. Prescribed pressure from SS was BiPAP ST 21/15 cm rate 14. BiPAP ST raw data was reviewed to find an acceptable reduced pressure. Upon assessment of the patient's machine, settings were 16/10 cm rate 14, 15 minute ramp from 12/6 cm. Pressure was not adjusted, however min Ti and cycle timing was adjusted for patient comfort. 4 masks were tried; F [employer] P V[employer] full face mask, medium, was well tolerated and without leak.  Will check download tomorrow for review of respiratory events and leak

## 2020-08-13 NOTE — PROGRESS NOTES
Bedside shift change report given to [facility] (oncoming nurse) by Laci Ferreira RN (offgoing nurse). Report included the following information SBAR, Kardex, OR Summary, Intake/Output, MAR, Recent Results and Cardiac Rhythm NSR, PAC's.

## 2020-08-13 NOTE — PROGRESS NOTES
Today's Date: 8/13/2020  Date of Admission: 8/10/2020    Chart Reviewed. Subjective:     Pt feels ok. He denies any current complaints. He has a productive cough. Medications Reviewed. Objective:     Vitals:    08/12/20 2252 08/13/20 0333 08/13/20 0705 08/13/20 0713   BP: 101/55 91/52  124/60   Pulse: 89 77  88   Resp: 18 18  18   Temp: 99 °F (37.2 °C) 98.8 °F (37.1 °C)  97.2 °F (36.2 °C)   SpO2: 93% 95%  95%   Weight:   210 lb 6.4 oz (95.4 kg)        Intake and Output  Current Shift: 08/13 0701 - 08/13 1900  In: 360 [P.O.:360]  Out: -    Last 3 Shifts: 08/11 1901 - 08/13 0700  In: 1070 [P.O.:1070]  Out: 775 [Urine:775]    Physical Exam:  General: Well Developed, Well Nourished, No Acute Distress, Alert & Oriented x 3, Appropriate mood  Neck: supple, no JVD  Heart: S1S2 with RRR without murmurs or gallops  Lungs: Clear throughout auscultation bilaterally without adventitious sounds  Abd: soft, nontender, nondistended, with good bowel sounds  Ext: no edema bilaterally  Sternal incision: clean, dry, and intact  Skin: warm and dry    LABS  Data Review:   Recent Labs     08/13/20  0343 08/12/20  0331 08/11/20  0314  08/10/20  1242    140 144  --  144   K 3.8 4.9 4.5   < > 3.5   MG  --  2.3 2.5*   < > 3.0*   BUN 34* 27* 21  --  23   CREA 1.60* 1.75* 1.10  --  1.22   * 148* 94  --  145*   WBC 7.6 11.2* 10.3  --  10.8   HGB 7.9* 8.8* 9.6*   < > 8.7*   HCT 24.6* 28.6* 29.5*   < > 26.7*   PLT 80* 83* 102*  --  65*   INR  --   --   --   --  1.4    < > = values in this interval not displayed. Estimated Creatinine Clearance: 45.2 mL/min (A) (based on SCr of 1.6 mg/dL (H)).       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)    Holding ASA for now, holding BB as well for low BP, continue statin, stable on room air, continue PT, recheck CBC in AM     Active Problems:    CAD (coronary artery disease) (8/10/2020)  S/p CABG, continue medical therapy        Atherosclerosis of native coronary artery of native heart with unstable angina pectoris ([facility] [address] (8/10/2020)  S/p CABG       Central sleep apnea (8/11/2020)  Pulmonary following, on home BIPAP     Thrombocytopenia  Holding ASA, monitor        Acute blood loss anemia (8/13/2020)   pt asymptomatic, repeat CBC in AM          [email or web address]

## 2020-08-14 PROBLEM — I48.91 ATRIAL FIBRILLATION (HCC): Status: ACTIVE | Noted: 2020-08-14

## 2020-08-14 LAB
ANION GAP SERPL CALC-SCNC: 6 MMOL/L (ref 7–16)
BUN SERPL-MCNC: 32 MG/DL (ref 8–23)
CALCIUM SERPL-MCNC: 8.3 MG/DL (ref 8.3–10.4)
CHLORIDE SERPL-SCNC: 107 MMOL/L (ref 98–107)
CO2 SERPL-SCNC: 26 MMOL/L (ref 21–32)
CREAT SERPL-MCNC: 1.46 MG/DL (ref 0.8–1.5)
ERYTHROCYTE [DISTWIDTH] IN BLOOD BY AUTOMATED COUNT: 13.5 % (ref 11.9–14.6)
GLUCOSE BLD STRIP.AUTO-MCNC: 124 MG/DL (ref 65–100)
GLUCOSE BLD STRIP.AUTO-MCNC: 125 MG/DL (ref 65–100)
GLUCOSE BLD STRIP.AUTO-MCNC: 138 MG/DL (ref 65–100)
GLUCOSE BLD STRIP.AUTO-MCNC: 161 MG/DL (ref 65–100)
GLUCOSE SERPL-MCNC: 115 MG/DL (ref 65–100)
HCT VFR BLD AUTO: 24.1 % (ref 41.1–50.3)
HGB BLD-MCNC: 7.8 G/DL (ref 13.6–17.2)
MCH RBC QN AUTO: 30.5 PG (ref 26.1–32.9)
MCHC RBC AUTO-ENTMCNC: 32.4 G/DL (ref 31.4–35)
MCV RBC AUTO: 94.1 FL (ref 79.6–97.8)
NRBC # BLD: 0 K/UL (ref 0–0.2)
PLATELET # BLD AUTO: 105 K/UL (ref 150–450)
PMV BLD AUTO: 12.5 FL (ref 9.4–12.3)
POTASSIUM SERPL-SCNC: 3.9 MMOL/L (ref 3.5–5.1)
RBC # BLD AUTO: 2.56 M/UL (ref 4.23–5.6)
SODIUM SERPL-SCNC: 139 MMOL/L (ref 136–145)
WBC # BLD AUTO: 7.8 K/UL (ref 4.3–11.1)

## 2020-08-14 PROCEDURE — 82962 GLUCOSE BLOOD TEST: CPT

## 2020-08-14 PROCEDURE — 77030040393 HC DRSG OPTIFOAM GENT MDII -B

## 2020-08-14 PROCEDURE — 97530 THERAPEUTIC ACTIVITIES: CPT

## 2020-08-14 PROCEDURE — 65660000004 HC RM CVT STEPDOWN

## 2020-08-14 PROCEDURE — 85027 COMPLETE CBC AUTOMATED: CPT

## 2020-08-14 PROCEDURE — 74011250637 HC RX REV CODE- 250/637: Performed by: PHYSICIAN ASSISTANT

## 2020-08-14 PROCEDURE — 74011250636 HC RX REV CODE- 250/636: Performed by: THORACIC SURGERY (CARDIOTHORACIC VASCULAR SURGERY)

## 2020-08-14 PROCEDURE — 80048 BASIC METABOLIC PNL TOTAL CA: CPT

## 2020-08-14 PROCEDURE — 77030012890

## 2020-08-14 PROCEDURE — 97110 THERAPEUTIC EXERCISES: CPT

## 2020-08-14 PROCEDURE — 74011250637 HC RX REV CODE- 250/637: Performed by: THORACIC SURGERY (CARDIOTHORACIC VASCULAR SURGERY)

## 2020-08-14 PROCEDURE — 36415 COLL VENOUS BLD VENIPUNCTURE: CPT

## 2020-08-14 PROCEDURE — 74011000258 HC RX REV CODE- 258: Performed by: THORACIC SURGERY (CARDIOTHORACIC VASCULAR SURGERY)

## 2020-08-14 RX ORDER — AMIODARONE HYDROCHLORIDE 200 MG/1
400 TABLET ORAL EVERY 12 HOURS
Status: DISCONTINUED | OUTPATIENT
Start: 2020-08-14 | End: 2020-08-18 | Stop reason: HOSPADM

## 2020-08-14 RX ADMIN — MENTHOL, METHYL SALICYLATE: 10; 15 CREAM TOPICAL at 10:17

## 2020-08-14 RX ADMIN — FAMOTIDINE 20 MG: 20 TABLET, FILM COATED ORAL at 09:25

## 2020-08-14 RX ADMIN — SENNOSIDES AND DOCUSATE SODIUM 2 TABLET: 8.6; 5 TABLET ORAL at 21:33

## 2020-08-14 RX ADMIN — ACETAMINOPHEN 650 MG: 325 TABLET, FILM COATED ORAL at 15:47

## 2020-08-14 RX ADMIN — OXYBUTYNIN CHLORIDE 5 MG: 5 TABLET ORAL at 09:25

## 2020-08-14 RX ADMIN — ASPIRIN 81 MG: 81 TABLET, COATED ORAL at 09:24

## 2020-08-14 RX ADMIN — AMIODARONE HYDROCHLORIDE 400 MG: 200 TABLET ORAL at 21:33

## 2020-08-14 RX ADMIN — Medication 10 ML: at 21:34

## 2020-08-14 RX ADMIN — ATORVASTATIN CALCIUM 40 MG: 40 TABLET, FILM COATED ORAL at 21:33

## 2020-08-14 RX ADMIN — LACTULOSE 30 ML: 20 SOLUTION ORAL at 15:05

## 2020-08-14 RX ADMIN — Medication 10 ML: at 15:48

## 2020-08-14 RX ADMIN — Medication 10 ML: at 05:50

## 2020-08-14 RX ADMIN — AMIODARONE HYDROCHLORIDE 1 MG/MIN: 50 INJECTION, SOLUTION INTRAVENOUS at 07:57

## 2020-08-14 RX ADMIN — Medication 10 ML: at 07:57

## 2020-08-14 RX ADMIN — AMIODARONE HYDROCHLORIDE 0.5 MG/MIN: 50 INJECTION, SOLUTION INTRAVENOUS at 17:37

## 2020-08-14 RX ADMIN — POTASSIUM CHLORIDE 20 MEQ: 20 TABLET, EXTENDED RELEASE ORAL at 05:50

## 2020-08-14 RX ADMIN — ACETAMINOPHEN 650 MG: 325 TABLET, FILM COATED ORAL at 08:07

## 2020-08-14 NOTE — PROGRESS NOTES
Pt converted to NSR. HR in the 80s. Ingridzackery Marlys wants to continue to infuse amio until bag is complete and then switch to PO.

## 2020-08-14 NOTE — DISCHARGE INSTRUCTIONS
Patient Education      Coronary Artery Bypass Graft: What to Expect at Home  Your Recovery     Coronary artery bypass graft (CABG) is surgery to treat coronary artery disease. The surgery helps blood make a detour, or bypass, around one or more narrowed or blocked coronary arteries. Coronary arteries are the blood vessels that bring blood to the heart. Your doctor did the surgery through a cut, called an incision, in your chest.  You will feel tired and sore for the first few weeks after surgery. You may have some brief, sharp pains on either side of your chest. Your chest, shoulders, and upper back may ache. The incision in your chest and the area where the healthy vein was taken may be sore or swollen. These symptoms usually get better after 4 to 6 weeks. You will probably be able to do many of your usual activities after 4 to 6 weeks. But for 2 to 3 months you will not be able to lift heavy objects or do activities that strain your chest or upper arm muscles. At first you may notice that you get tired easily and need to rest often. It may take 1 to 2 months to get your energy back. Some people find that they are more emotional after this surgery. You may cry easily or show emotion in ways that are unusual for you. This is common and may last for up to a year. Some people get depressed after CABG surgery. Talk with your doctor if you have sadness that continues or you are concerned about how you are feeling. Treatment and other support can help you feel better. Even though the surgery may improve your symptoms, you will still need to make changes in your lifestyle to lower your risk of a heart attack or stroke. It will be important to eat a heart-healthy diet, get regular exercise, not smoke, take your heart medicines, and reduce stress.   You will likely start a cardiac rehabilitation (rehab) program in the hospital. You will continue with this rehab program after you go home to help you recover and prevent problems with your heart. Talk to your doctor about whether rehab is right for you. This care sheet gives you a general idea about how long it will take for you to recover. But each person recovers at a different pace. Follow the steps below to get better as quickly as possible. How can you care for yourself at home? Activity  · Rest when you feel tired. Getting enough sleep will help you recover. Try to sleep on your back for 4 to 6 weeks while your breastbone (sternum) heals. This usually takes about 4 to 6 weeks. · Try to walk each day. Start by walking a little more than you did the day before. Bit by bit, increase the amount you walk. Walking boosts blood flow and helps prevent pneumonia and constipation. · Avoid strenuous activities, such as bicycle riding, jogging, weight lifting, or heavy aerobic exercise, until your doctor says it is okay. · For 3 months, avoid activities that strain your chest or upper arm muscles. This includes pushing a  or vacuum, mopping floors, or swinging a golf club or tennis racquet. · For 2 to 3 months, avoid lifting anything that would make you strain. This may include a child, heavy grocery bags and milk containers, a heavy briefcase or backpack, or cat litter or dog food bags. · Hold a pillow firmly over your chest incision when you cough or take deep breaths. This will support your chest and reduce your pain. · Do breathing exercises at home as instructed by your doctor. This will help prevent pneumonia. · Ask your doctor when you can drive again. · You will probably need to take 4 to 12 weeks off from work. It depends on the type of work you do and how you feel. · You may shower as usual. Pat the incision dry. Do not take a bath for the first 3 weeks, or until your doctor tells you it is okay. · Do not swim or use a hot tub for at least 1 month, or until your doctor says it is okay. · Ask your doctor when it is okay for you to have sex.   Diet  · Eat a heart-healthy diet. If you have not been eating this way, talk to your doctor. You also may want to talk to a dietitian. A dietitian can help you learn about healthy foods. · Drink plenty of fluids (unless your doctor tells you not to). · You may notice that your bowel movements are not regular right after your surgery. This is common. Try to avoid constipation and straining with bowel movements. You may want to take a fiber supplement every day. If you have not had a bowel movement after a couple of days, ask your doctor about taking a mild laxative. Medicines  · Your doctor will tell you if and when you can restart your medicines. He or she will also give you instructions about taking any new medicines. · If you take aspirin or some other blood thinner, ask your doctor if and when to start taking it again. Make sure that you understand exactly what your doctor wants you to do. · Your doctor may give you medicines to prevent blood clots, keep your heartbeat steady, and lower your blood pressure and cholesterol. Take your medicines exactly as prescribed. Call your doctor if you think you are having a problem with your medicine. · Be safe with medicines. Take pain medicines exactly as directed. ? If the doctor gave you a prescription medicine for pain, take it as prescribed. ? If you are not taking a prescription pain medicine, ask your doctor if you can take an over-the-counter medicine. ? Do not take aspirin, ibuprofen (Advil, Motrin), naproxen (Aleve), or other nonsteroidal anti-inflammatory drugs (NSAIDs) unless your doctor says it is okay. · If you think your pain medicine is making you sick to your stomach:  ? Take your medicine after meals (unless your doctor has told you not to). ? Ask your doctor for a different pain medicine. · If your doctor prescribed antibiotics, take them as directed. Do not stop taking them just because you feel better.  You need to take the full course of antibiotics. Incision care  · If you have strips of tape on the incisions the doctor made, leave the tape on for a week or until it falls off. · Wash the area daily with warm, soapy water, and pat it dry. Don't use hydrogen peroxide or alcohol, which can slow healing. You may cover the area with a gauze bandage if it weeps or rubs against clothing. Change the bandage every day. · Keep the area clean and dry. · Do not use any creams, lotions, powders, ointments, or oils unless your doctor tells you it is okay. · If you have an incision in your leg:  ? Wear support stockings on your legs during the day for the first 2 weeks. You can take the stockings off at night while you sleep. ? Raise your legs above the level of your heart whenever you lay down for the first 4 to 6 weeks. Other instructions  · Keep track of your weight. Weigh yourself every day at the same time of day, on the same scale, in the same amount of clothing. A sudden increase in weight can be a sign of a problem with your heart. Tell your doctor if you suddenly gain weight, such as 3 pounds or more in 2 to 3 days. · Do not smoke. Smoking can make it harder for you to recover and it will raise the chances of your arteries narrowing again. If you need help quitting, talk to your doctor about stop-smoking programs and medicines. These can increase your chances of quitting for good. Follow-up care is a key part of your treatment and safety. Be sure to make and go to all appointments, and call your doctor if you are having problems. It's also a good idea to know your test results and keep a list of the medicines you take. When should you call for help? [id number] anytime you think you may need emergency care. For example, call if:  · You passed out (lost consciousness). · You have severe trouble breathing. · You have sudden chest pain and shortness of breath, or you cough up blood.   · You have severe pain in your chest.  · You have symptoms of a heart attack. These may include:  ? Chest pain or pressure, or a strange feeling in the chest.  ? Sweating. ? Shortness of breath. ? Nausea or vomiting. ? Pain, pressure, or a strange feeling in the back, neck, jaw, or upper belly or in one or both shoulders or arms. ? Lightheadedness or sudden weakness. ? A fast or irregular heartbeat. After you call 911, the  may tell you to chew 1 adult-strength or 2 to 4 low-dose aspirin. Wait for an ambulance. Do not try to drive yourself. · You have angina symptoms (such as chest pain or pressure) that do not go away with rest or are not getting better within 5 minutes after you take a dose of nitroglycerin. Call your doctor now or seek immediate medical care if:  · You have pain that does not get better after you take pain medicine. · You have a fever over 100°F.  · You have loose stitches, or your incision comes open. · Bright red blood has soaked through the bandage over your incision. · You have signs of infection, such as:  ? Increased pain, swelling, warmth, or redness. ? Red streaks leading from the incision. ? Pus draining from the incision. ? Swollen lymph nodes in your neck, armpits, or groin. ? A fever. · You have signs of a blood clot in a leg. If you had a vein removed from your leg, you may have tenderness and swelling while your leg heals. But signs of a blood clot may be in a different part of your leg and may include:  ? Pain in your calf, back of the knee, thigh, or groin. ? Redness and swelling in your leg or groin. · Your heartbeat feels very fast or slow, skips beats, or flutters. · You are dizzy or lightheaded, or you feel like you may faint. · You have new or increased shortness of breath. Watch closely for changes in your health, and be sure to contact your doctor if:  · You gain weight suddenly, such as 3 pounds or more in 2 to 3 days. · You have increased swelling in your legs, ankles, or feet.   · You have any concerns about your incision. · You feel very sad or have other signs of depression, such as trouble sleeping or eating. · You have questions about diet, exercise, quitting smoking, or stress reduction after surgery. Where can you learn more? Go to http://www.gray.com/  Enter F759 in the search box to learn more about \"Coronary Artery Bypass Graft: What to Expect at Home. \"  Current as of: December 16, 2019               Content Version: 12.5  © 1610-9130 [employer]. Care instructions adapted under license by [employer] (which disclaims liability or warranty for this information). If you have questions about a medical condition or this instruction, always ask your healthcare professional. [address] any warranty or liability for your use of this information. Reducing Risk of Another Heart Attack With Medicine: Care Instructions  Your Care Instructions     After a heart attack, medicines help lower your risk of having another one. These medicines include:  · ACE inhibitors or ARBs. These are types of blood pressure medicines. · Statins and other cholesterol medicines. These lower cholesterol. · Aspirin and other antiplatelets. These medicines prevent blood clots from forming in your blood vessels. This can help prevent a heart attack. · Beta-blocker medicines. These are a type of blood pressure and heart medicine. All medicines can cause side effects. So it is important to understand the pros and cons of any medicine you take. It is also important to take your medicines exactly as your doctor tells you to. Follow-up care is a key part of your treatment and safety. Be sure to make and go to all appointments, and call your doctor if you are having problems. It's also a good idea to know your test results and keep a list of the medicines you take. ACE inhibitors  ACE (angiotensin-converting enzyme) inhibitors are used for three main reasons. They lower blood pressure, protect the kidneys, and prevent heart attacks and strokes. Examples include benazepril (Lotensin), lisinopril (Prinivil), and ramipril (Altace). An angiotensin II receptor blocker (ARB) may be used instead of an ACE inhibitor. ARBs help you in the same ways as ACE inhibitors. Examples include candesartan (Atacand), irbesartan (Avapro), and losartan (Cozaar). Before you start taking an ACE inhibitor or an ARB, make sure your doctor knows if:  · You are taking a water pill (diuretic). · You are taking potassium pills or using salt substitutes. · You are pregnant or breastfeeding. · You have had a kidney transplant or other kidney problems. ACE inhibitors and ARBs can cause side effects. Call your doctor right away if you have:  · Trouble breathing. · Swelling in your face, head, neck, or tongue. Statins  Statins can help lower your risk for a heart attack and stroke. This medicine lowers your cholesterol. Examples include atorvastatin (Lipitor), lovastatin (Mevacor), pravastatin (Pravachol), and simvastatin (Zocor). Before you start taking a statin, make sure your doctor knows if:  · You have had a kidney transplant or other kidney problems. · You have liver disease. · You take any other prescription medicine, over-the-counter medicine, vitamins, supplements, or herbal remedies. · You are pregnant or breastfeeding. Statins can cause side effects. Call your doctor right away if you have:  · New, severe muscle aches. · Brown urine. Aspirin  After a heart attack, aspirin can help lower your risk of having another one. Most heart attacks are caused by a blood clot that blocks a coronary artery. When this happens, oxygen can't get to the heart muscle, and part of the heart dies. Aspirin can help prevent blood clots that can block the blood vessels. You may not be able to use aspirin if you:  · Have asthma or certain other health conditions.   · Have an ulcer or other stomach problem. · Take some other medicine (called a blood thinner) that prevents blood clots. · Are allergic to aspirin. Your doctor may recommend that you take one low-dose aspirin (81 mg) tablet each day, with a meal and a full glass of water. Aspirin can also cause serious bleeding. Be sure you get instructions about how to take aspirin safely. Call your doctor right away if you have:  · Unusual bleeding or bruising. · Nausea, vomiting, or heartburn. · Black or bloody stools. Beta-blockers  Beta-blockers are used for three main reasons. They lower blood pressure, relieve angina symptoms (such as chest pain or pressure), and reduce the chances of a second heart attack. They include atenolol (Tenormin), carvedilol (Coreg), and metoprolol (Lopressor). Before you start taking a beta-blocker, make sure your doctor knows if you have:  · Severe asthma or frequent asthma attacks. · A very slow pulse (less than 55 beats a minute). Beta-blockers can cause side effects. Call your doctor right away if you have:  · Wheezing or trouble breathing. · Dizziness or lightheadedness. · Asthma that gets worse. When should you call for help? Watch closely for changes in your health, and be sure to contact your doctor if you have any problems. Where can you learn more? Go to http://www.gray.com/  Enter R428 in the search box to learn more about \"Reducing Risk of Another Heart Attack With Medicine: Care Instructions. \"  Current as of: December 16, 2019               Content Version: 12.5  © 2681-0161 [employer]. Care instructions adapted under license by [employer] (which disclaims liability or warranty for this information). If you have questions about a medical condition or this instruction, always ask your healthcare professional. [place] [address] any warranty or liability for your use of this information.      Heart-Healthy Diet: Care Instructions  Your Care Instructions     A heart-healthy diet has lots of vegetables, fruits, nuts, beans, and whole grains, and is low in salt. It limits foods that are high in saturated fat, such as meats, cheeses, and fried foods. It may be hard to change your diet, but even small changes can lower your risk of heart attack and heart disease. Follow-up care is a key part of your treatment and safety. Be sure to make and go to all appointments, and call your doctor if you are having problems. It's also a good idea to know your test results and keep a list of the medicines you take. How can you care for yourself at home? Watch your portions  · Learn what a serving is. A \"serving\" and a \"portion\" are not always the same thing. Make sure that you are not eating larger portions than are recommended. For example, a serving of pasta is ½ cup. A serving size of meat is 2 to 3 ounces. A 3-ounce serving is about the size of a deck of cards. Measure serving sizes until you are good at [place]" them. Keep in mind that restaurants often serve portions that are 2 or 3 times the size of one serving. · To keep your energy level up and keep you from feeling hungry, eat often but in smaller portions. · Eat only the number of calories you need to stay at a healthy weight. If you need to lose weight, eat fewer calories than your body burns (through exercise and other physical activity). Eat more fruits and vegetables  · Eat a variety of fruit and vegetables every day. Dark green, deep orange, red, or yellow fruits and vegetables are especially good for you. Examples include spinach, carrots, peaches, and berries. · Keep carrots, celery, and other veggies handy for snacks. Buy fruit that is in season and store it where you can see it so that you will be tempted to eat it. · Cook dishes that have a lot of veggies in them, such as stir-fries and soups.   Limit saturated and trans fat  · Read food labels, and try to avoid saturated and trans fats. They increase your risk of heart disease. · Use olive or canola oil when you cook. · Bake, broil, grill, or steam foods instead of frying them. · Choose lean meats instead of high-fat meats such as hot dogs and sausages. Cut off all visible fat when you prepare meat. · Eat fish, skinless poultry, and meat alternatives such as soy products instead of high-fat meats. Soy products, such as tofu, may be especially good for your heart. · Choose low-fat or fat-free milk and dairy products. Eat foods high in fiber  · Eat a variety of grain products every day. Include whole-grain foods that have lots of fiber and nutrients. Examples of whole-grain foods include oats, whole wheat bread, and brown rice. · Buy whole-grain breads and cereals, instead of white bread or pastries. Limit salt and sodium  · Limit how much salt and sodium you eat to help lower your blood pressure. · Taste food before you salt it. Add only a little salt when you think you need it. With time, your taste buds will adjust to less salt. · Eat fewer snack items, fast foods, and other high-salt, processed foods. Check food labels for the amount of sodium in packaged foods. · Choose low-sodium versions of canned goods (such as soups, vegetables, and beans). Limit sugar  · Limit drinks and foods with added sugar. These include candy, desserts, and soda pop. Limit alcohol  · Limit alcohol to no more than 2 drinks a day for men and 1 drink a day for women. Too much alcohol can cause health problems. When should you call for help? Watch closely for changes in your health, and be sure to contact your doctor if:  · You would like help planning heart-healthy meals. Where can you learn more? Go to http://www.[employer].com/  Enter V137 in the search box to learn more about \"Heart-Healthy Diet: Care Instructions. \"  Current as of: August 22, 2019               Content Version: 12.5  © 4149-0830 [employer].    Care instructions adapted under license by [employer] (which disclaims liability or warranty for this information). If you have questions about a medical condition or this instruction, always ask your healthcare professional. [address] any warranty or liability for your use of this information.

## 2020-08-14 NOTE — PROGRESS NOTES
Pt alternating between NSR and A Fib. [place], [place] states to keep pt on IV amiodarone gtt overnight and reevaluate tomorrow.

## 2020-08-14 NOTE — PROGRESS NOTES
Problem: Mobility Impaired (Adult and Pediatric)  Goal: *Acute Goals and Plan of Care (Insert Text)  Outcome: Progressing Towards Goal  Note:   1. Mr. Sobeida Tucker will perform supine to sit and sit to supine with bed flat and no rails independently in 7 days. 2.  Mr. Sobeida Tucker will perform sit to stand and bed to chair with least restrictive device independently in 7 days. 3.  Mr. Sobeida Tucker will perform gait with least restrictive device >500 ft independently in 7 days. 4.  Mr. Sobeida Tucker will perform therex to bilateral lower extremities x 25 reps in sitting and standing in 7 days. PHYSICAL THERAPY: Daily Note and PM 8/14/2020  INPATIENT: PT Visit Days : 4  [place] / Product Type: Medicare /       NAME/AGE/GENDER: Mague Godoy is a [de-identified] y.o. male   PRIMARY DIAGNOSIS: Atherosclerosis of native coronary artery of native heart with unstable angina pectoris ([facility] [address] [I25.110]  CAD (coronary artery disease) [I25.10]  Atherosclerosis of native coronary artery of native heart with unstable angina pectoris (HCC) [I25.110] S/P CABG x 2 S/P CABG x 2  Procedure(s) (LRB):  CORONARY ARTERY BYPASS GRAFT (CABG X2), LIMA (N/A)  VEIN HARVEST, GREATER SAPHENOUS (Right)  ESOPHAGEAL TRANS ECHOCARDIOGRAM (N/A)  4 Days Post-Op  ICD-10: Treatment Diagnosis:    · Generalized Muscle Weakness (M62.81)  · Difficulty in walking, Not elsewhere classified (R26.2)   Precaution/Allergies:  Adhesive and Tamsulosin      ASSESSMENT:     Mr. Sobeida Tucker presents sitting up in the chair. Agreeable to therapy. Daughter present. Lives with his wife and quite active prior to surgery. Still driving. Patient is able to scoot to the edge of the recliner without assistance. Sit to stand with stand by assist.  Gait training with rolling walker x 200 feet with steady ray. Patient is returned to the recliner and after a short rest break the patient participates in therapeutic exercises.   This am the patient has an IV as he is in A-fib. Good session  Making progress towards goal.  Patient is very pleasant and cooperative. Mr. Gucci Piedra is functioning below baseline and is therefore appropriate for skilled PT to maximize his rehab potential.  Home with HHPT at discharge. PM note:   Patient is agreeable. He needs to use the urinal before starting. Standing balance fair. Gait training with rolling walker with the patient maintaining am gait distance. Strengthening exercises continued. Patient is left sitting in the recliner with needs within reach and daughter at bedside. Will continue PT efforts. This section established at most recent assessment   PROBLEM LIST (Impairments causing functional limitations):  1. Decreased Strength  2. Decreased Transfer Abilities  3. Decreased Ambulation Ability/Technique  4. Decreased Activity Tolerance   INTERVENTIONS PLANNED: (Benefits and precautions of physical therapy have been discussed with the patient.)  1. Bed Mobility  2. Gait Training  3. Therapeutic Activites  4. Therapeutic Exercise/Strengthening  5. Transfer Training     TREATMENT PLAN: Frequency/Duration: twice daily for duration of hospital stay  Rehabilitation Potential For Stated Goals: Good     REHAB RECOMMENDATIONS (at time of discharge pending progress):    Placement: It is my opinion, based on this patient's performance to date, that Mr. Gucci Piedra may benefit from [address] after discharge due to the functional deficits listed above that are likely to improve with skilled rehabilitation because he/she has multiple medical issues that affect his/her functional mobility in the community. Equipment:    To be determined. HISTORY:   History of Present Injury/Illness (Reason for Referral):  As above.    Past Medical History/Comorbidities:   Mr. Gucci Piedra  has a past medical history of Arthritis, Buerger's disease ([facility] [address] CAD (coronary artery disease), Decreased mobility, Gout, Hematuria, microscopic, Hiatal hernia, Personal history of prostate cancer, Prostate cancer ([facility] [address] (~), and Sleep apnea. He also has no past medical history of Difficult intubation, Malignant hyperthermia due to anesthesia, Nausea & vomiting, or Pseudocholinesterase deficiency. Mr. Lani Del Valle  has a past surgical history that includes pr appendectomy; hx other surgical; hx cyst removal (Left); hx heart catheterization (2020); hx colonoscopy; hx turp; and hx cataract removal (Bilateral). Social History/Living Environment:   Home Environment: Private residence  # Steps to Enter: 0  One/Two Story Residence: One story  Living Alone: No  Support Systems: Child(canelo)  Patient Expects to be Discharged to[de-identified] Private residence  Current DME Used/Available at Home: [employer], straight(doesnt use it.)  Tub or Shower Type: Shower  Prior Level of Function/Work/Activity:  independent  Age,    Number of Personal Factors/Comorbidities that affect the Plan of Care: 1-2: MODERATE COMPLEXITY   EXAMINATION:   Most Recent Physical Functioning:   Gross Assessment:                  Posture:     Balance:  Sitting: Intact  Standing: Impaired  Standing - Static: Good  Standing - Dynamic : Fair Bed Mobility:     Wheelchair Mobility:     Transfers:  Sit to Stand: Stand-by assistance  Stand to Sit: Stand-by assistance  Gait:     Distance (ft): 200 Feet (ft)  Assistive Device: Walker, rolling  Ambulation - Level of Assistance: Stand-by assistance;Contact guard assistance  Interventions: Safety awareness training      Body Structures Involved:  1. Muscles Body Functions Affected:  1. Movement Related Activities and Participation Affected:  1.  Mobility   Number of elements that affect the Plan of Care: 3: MODERATE COMPLEXITY   CLINICAL PRESENTATION:   Presentation: Evolving clinical presentation with changing clinical characteristics: MODERATE COMPLEXITY   CLINICAL DECISION MAKIN [facility] Mob[facility] Inpatient Short Form  How much difficulty does the patient currently have. .. Unable A Lot A Little None   1. Turning over in bed (including adjusting bedclothes, sheets and blankets)? [] 1   [] 2   [x] 3   [] 4   2. Sitting down on and standing up from a chair with arms ( e.g., wheelchair, bedside commode, etc.)   [] 1   [] 2   [x] 3   [] 4   3. Moving from lying on back to sitting on the side of the bed? [] 1   [] 2   [x] 3   [] 4   How much help from another person does the patient currently need. .. Total A Lot A Little None   4. Moving to and from a bed to a chair (including a wheelchair)? [] 1   [] 2   [x] 3   [] 4   5. Need to walk in hospital room? [] 1   [] 2   [x] 3   [] 4   6. Climbing 3-5 steps with a railing? [] 1   [] 2   [x] 3   [] 4   © 2007, Trustees of [address] [place] [address], under license to [employer]. All rights reserved      Score:  Initial: 18 Most Recent: X (Date: -- )    Interpretation of Tool:  Represents activities that are increasingly more difficult (i.e. Bed mobility, Transfers, Gait). Medical Necessity:     · Patient is expected to demonstrate progress in   · functional technique  ·  to   · increase independence with mobility and gait. · .  Reason for Services/Other Comments:  · Patient   · continues to require present interventions due to patient's inability to function at baseline. · .   Use of outcome tool(s) and clinical judgement create a POC that gives a: Questionable prediction of patient's progress: MODERATE COMPLEXITY            TREATMENT:   (In addition to Assessment/Re-Assessment sessions the following treatments were rendered)   Pre-treatment Symptoms/Complaints:  \"I'm ready\"  Pain: Initial:   Pain Intensity 1: 0  Post Session:  0/10     Therapeutic Activity: (    13 min):   Therapeutic activities including Chair transfers, Ambulation on level ground and walker safety, posture, there ex in sitting (noted in chart below) to improve mobility, strength, balance and coordination. Required minimal Safety awareness training to promote static and dynamic balance in standing and promote coordination of bilateral, lower extremity(s). Therapeutic Exercise: ( 10 minutes):  Exercises per grid below to improve mobility, strength, balance and coordination. Required minimum  verbal cues. Progressed repetitions and complexity of movement as indicated. Date:  8/13/20 Date:  8/14/20 Date:     ACTIVITY/EXERCISE AM PM AM PM AM PM   Seated LAQ 20  20 20     Seated AP 20  20 20     Seated marching 20  20 20     Shoulder shrugs 20  20 20     Gluteal sets 20  20 20     abduction 20  20 20                  Braces/Orthotics/Lines/Etc:   · IV  Treatment/Session Assessment:    · Response to Treatment:  Tolerated well   · Interdisciplinary Collaboration:   o Physical Therapy Assistant  o Registered Nurse  · After treatment position/precautions:   o Up in chair  o Bed/Chair-wheels locked  o Call light within reach  o RN notified  o Family at bedside   · Compliance with Program/Exercises: Compliant all of the time  · Recommendations/Intent for next treatment session: \"Next visit will focus on advancements to more challenging activities and reduction in assistance provided\".   Total Treatment Duration:  PT Patient Time In/Time Out  Time In: 1331  Time Out: 1354    Blayne Flores PTA

## 2020-08-14 NOTE — PROGRESS NOTES
Bedside shift change report given to myself (oncoming nurse) by JULIANN Juarez (offgoing nurse). Report included the following information SBAR, Kardex, Procedure Summary, Intake/Output, MAR, Recent Results and Cardiac Rhythm A.fib/NSR.

## 2020-08-14 NOTE — PROGRESS NOTES
Bedside report received from Oxana Enrique RN. Opportunity for questions, concerns. Patient involved.

## 2020-08-14 NOTE — PROGRESS NOTES
Problem: Mobility Impaired (Adult and Pediatric)  Goal: *Acute Goals and Plan of Care (Insert Text)  Outcome: Progressing Towards Goal  Note:   1. Mr. Fannie Taylor will perform supine to sit and sit to supine with bed flat and no rails independently in 7 days. 2.  Mr. Fannie Taylor will perform sit to stand and bed to chair with least restrictive device independently in 7 days. 3.  Mr. Fannie Taylor will perform gait with least restrictive device >500 ft independently in 7 days. 4.  Mr. Fannie Taylor will perform therex to bilateral lower extremities x 25 reps in sitting and standing in 7 days. PHYSICAL THERAPY: Daily Note and AM 8/14/2020  INPATIENT: PT Visit Days : 4  [place] / Product Type: Medicare /       NAME/AGE/GENDER: Jairo Montez is a [de-identified] y.o. male   PRIMARY DIAGNOSIS: Atherosclerosis of native coronary artery of native heart with unstable angina pectoris ([facility] [address] [I25.110]  CAD (coronary artery disease) [I25.10]  Atherosclerosis of native coronary artery of native heart with unstable angina pectoris (HCC) [I25.110] S/P CABG x 2 S/P CABG x 2  Procedure(s) (LRB):  CORONARY ARTERY BYPASS GRAFT (CABG X2), LIMA (N/A)  VEIN HARVEST, GREATER SAPHENOUS (Right)  ESOPHAGEAL TRANS ECHOCARDIOGRAM (N/A)  4 Days Post-Op  ICD-10: Treatment Diagnosis:    · Generalized Muscle Weakness (M62.81)  · Difficulty in walking, Not elsewhere classified (R26.2)   Precaution/Allergies:  Adhesive and Tamsulosin      ASSESSMENT:     Mr. Fannie Taylor presents sitting up in the chair. Agreeable to therapy. Daughter present. Lives with his wife and quite active prior to surgery. Still driving. Patient is able to scoot to the edge of the recliner without assistance. Sit to stand with stand by assist.  Gait training with rolling walker x 200 feet with steady ray. Patient is returned to the recliner and after a short rest break the patient participates in therapeutic exercises.   This am the patient has an IV as he is in A-fib. Good session  Making progress towards goal.  Patient is very pleasant and cooperative. Mr. Sukumar Monsivais is functioning below baseline and is therefore appropriate for skilled PT to maximize his rehab potential.  Home with HHPT at discharge. This section established at most recent assessment   PROBLEM LIST (Impairments causing functional limitations):  1. Decreased Strength  2. Decreased Transfer Abilities  3. Decreased Ambulation Ability/Technique  4. Decreased Activity Tolerance   INTERVENTIONS PLANNED: (Benefits and precautions of physical therapy have been discussed with the patient.)  1. Bed Mobility  2. Gait Training  3. Therapeutic Activites  4. Therapeutic Exercise/Strengthening  5. Transfer Training     TREATMENT PLAN: Frequency/Duration: twice daily for duration of hospital stay  Rehabilitation Potential For Stated Goals: Good     REHAB RECOMMENDATIONS (at time of discharge pending progress):    Placement: It is my opinion, based on this patient's performance to date, that Mr. Sukumar Monsivais may benefit from [address] after discharge due to the functional deficits listed above that are likely to improve with skilled rehabilitation because he/she has multiple medical issues that affect his/her functional mobility in the community. Equipment:    To be determined. HISTORY:   History of Present Injury/Illness (Reason for Referral):  As above. Past Medical History/Comorbidities:   Mr. Sukumar Monsivais  has a past medical history of Arthritis, Buerger's disease ([address] CAD (coronary artery disease), Decreased mobility, Gout, Hematuria, microscopic, Hiatal hernia, Personal history of prostate cancer, Prostate cancer ([address] (~2000), and Sleep apnea. He also has no past medical history of Difficult intubation, Malignant hyperthermia due to anesthesia, Nausea & vomiting, or Pseudocholinesterase deficiency.   Mr. Sukumar Monsivais  has a past surgical history that includes pr appendectomy; hx other surgical; hx cyst removal (Left); hx heart catheterization (2020); hx colonoscopy; hx turp; and hx cataract removal (Bilateral). Social History/Living Environment:   Home Environment: Private residence  # Steps to Enter: 0  One/Two Story Residence: One story  Living Alone: No  Support Systems: Child(canelo)  Patient Expects to be Discharged to[de-identified] Private residence  Current DME Used/Available at Home: Delmer Mealing, straight(doesnt use it.)  Tub or Shower Type: Shower  Prior Level of Function/Work/Activity:  independent  Age,    Number of Personal Factors/Comorbidities that affect the Plan of Care: 1-2: MODERATE COMPLEXITY   EXAMINATION:   Most Recent Physical Functioning:   Gross Assessment:                  Posture:     Balance:  Sitting: Intact  Standing: Impaired  Standing - Static: Good  Standing - Dynamic : Fair Bed Mobility:     Wheelchair Mobility:     Transfers:  Sit to Stand: Stand-by assistance  Stand to Sit: Stand-by assistance  Gait:     Distance (ft): 200 Feet (ft)  Assistive Device: Walker, rolling  Ambulation - Level of Assistance: Stand-by assistance;Contact guard assistance  Interventions: Safety awareness training      Body Structures Involved:  1. Muscles Body Functions Affected:  1. Movement Related Activities and Participation Affected:  1. Mobility   Number of elements that affect the Plan of Care: 3: MODERATE COMPLEXITY   CLINICAL PRESENTATION:   Presentation: Evolving clinical presentation with changing clinical characteristics: MODERATE COMPLEXITY   CLINICAL DECISION MAKIN [facility] Inpatient Short Form  How much difficulty does the patient currently have. .. Unable A Lot A Little None   1. Turning over in bed (including adjusting bedclothes, sheets and blankets)? [] 1   [] 2   [x] 3   [] 4   2.   Sitting down on and standing up from a chair with arms ( e.g., wheelchair, bedside commode, etc.)   [] 1   [] 2 [x] 3   [] 4   3. Moving from lying on back to sitting on the side of the bed? [] 1   [] 2   [x] 3   [] 4   How much help from another person does the patient currently need. .. Total A Lot A Little None   4. Moving to and from a bed to a chair (including a wheelchair)? [] 1   [] 2   [x] 3   [] 4   5. Need to walk in hospital room? [] 1   [] 2   [x] 3   [] 4   6. Climbing 3-5 steps with a railing? [] 1   [] 2   [x] 3   [] 4   © 2007, Trustees of [address] [place] [address] [address] [address], under license to [employer]. All rights reserved      Score:  Initial: 18 Most Recent: X (Date: -- )    Interpretation of Tool:  Represents activities that are increasingly more difficult (i.e. Bed mobility, Transfers, Gait). Medical Necessity:     · Patient is expected to demonstrate progress in   · functional technique  ·  to   · increase independence with mobility and gait. · .  Reason for Services/Other Comments:  · Patient   · continues to require present interventions due to patient's inability to function at baseline. · .   Use of outcome tool(s) and clinical judgement create a POC that gives a: Questionable prediction of patient's progress: MODERATE COMPLEXITY            TREATMENT:   (In addition to Assessment/Re-Assessment sessions the following treatments were rendered)   Pre-treatment Symptoms/Complaints:  \"Good morning\"  Pain: Initial:   Pain Intensity 1: 0  Post Session:  0/10     Therapeutic Activity: (    14 min): Therapeutic activities including Chair transfers, Ambulation on level ground and walker safety, posture, there ex in sitting (noted in chart below) to improve mobility, strength, balance and coordination. Required minimal Safety awareness training to promote static and dynamic balance in standing and promote coordination of bilateral, lower extremity(s). Therapeutic Exercise: ( 12 minutes):  Exercises per grid below to improve mobility, strength, balance and coordination. Required minimum  verbal cues. Progressed repetitions and complexity of movement as indicated. Date:  8/13/20 Date:  8/14/20 Date:     ACTIVITY/EXERCISE AM PM AM PM AM PM   Seated LAQ 20  20      Seated AP 20  20      Seated marching 20  20      Shoulder shrugs 20  20      Gluteal sets 20  20      abduction 20  20                   Braces/Orthotics/Lines/Etc:   · IV  Treatment/Session Assessment:    · Response to Treatment:  Tolerated well   · Interdisciplinary Collaboration:   o Physical Therapy Assistant  o Registered Nurse  · After treatment position/precautions:   o Up in chair  o Bed/Chair-wheels locked  o Call light within reach  o RN notified  o Family at bedside   · Compliance with Program/Exercises: Compliant all of the time  · Recommendations/Intent for next treatment session: \"Next visit will focus on advancements to more challenging activities and reduction in assistance provided\".   Total Treatment Duration:  PT Patient Time In/Time Out  Time In: 1101  Time Out: 1126    Shawna Hernandez-Eddi, PTA

## 2020-08-14 NOTE — PROGRESS NOTES
Today's Date: 8/14/2020  Date of Admission: 8/10/2020    Chart Reviewed. Subjective:     Patient feels ok. He was unaware of a-fib with RVR. Medications Reviewed. Objective:     Vitals:    08/14/20 0352 08/14/20 0704 08/14/20 0745 08/14/20 0805   BP:  116/61  109/59   Pulse:  (!) 127  91   Resp:  19     Temp:  97.2 °F (36.2 °C)     SpO2:  91% 97%    Weight: 212 lb 8 oz (96.4 kg)          Intake and Output  Current Shift: No intake/output data recorded. Last 3 Shifts: 08/12 1901 - 08/14 0700  In: 600 [P.O.:600]  Out: 875 [Urine:875]    Physical Exam:  General: Well Developed, Well Nourished, No Acute Distress, Alert & Oriented x 3, Appropriate mood  Neck: supple, no JVD  Heart: S1S2 with RRR without murmurs or gallops  Lungs: Clear throughout auscultation bilaterally without adventitious sounds  Abd: soft, nontender, nondistended, with good bowel sounds  Ext: no edema bilaterally  Sternal incision: clean, dry, and intact  Skin: warm and dry    LABS  Data Review:   Recent Labs     08/14/20  0342 08/13/20  0343 08/12/20  0331    141 140   K 3.9 3.8 4.9   MG  --   --  2.3   BUN 32* 34* 27*   CREA 1.46 1.60* 1.75*   * 110* 148*   WBC 7.8 7.6 11.2*   HGB 7.8* 7.9* 8.8*   HCT 24.1* 24.6* 28.6*   * 80* 83*       Estimated Creatinine Clearance: 49.5 mL/min (based on SCr of 1.46 mg/dL).       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)    on ASA, statin, does not tolerate BB or ACE-I/ARB with low BP, stable on room air, in a-fib this morning, on IV amiodarone, continue PT, follow H&H     Active Problems:    CAD (coronary artery disease) (8/10/2020)  S/p CABG, continue medical therapy        Atherosclerosis of native coronary artery of native heart with unstable angina pectoris ([facility] [address] (8/10/2020)  S/p CABG       Central sleep apnea (8/11/2020)  Pulmonary following, on home BIPAP     Thrombocytopenia  Improved        Acute blood loss anemia (8/13/2020)   pt asymptomatic, repeat CBC in AM      Atrial fibrillation ([facility] [address] (8/14/2020)  Back in sinus rhythm currently, on IV amiodarone, will transition back to oral amiodarone later today if remains in sinus, no anticoagulation for now due to anemia          eKlli Hodges PA-C

## 2020-08-14 NOTE — PROGRESS NOTES
1617 Blood Glucose checked twice. First result was 161 but incorrect due to inadequate blood sample. Second result was 144 and did not  on Results Review. Humalog not given at ordered time of 1630 due to BG <150.

## 2020-08-15 ENCOUNTER — APPOINTMENT (OUTPATIENT)
Dept: GENERAL RADIOLOGY | Age: 80
DRG: 236 | End: 2020-08-15
Attending: THORACIC SURGERY (CARDIOTHORACIC VASCULAR SURGERY)
Payer: MEDICARE

## 2020-08-15 LAB
ANION GAP SERPL CALC-SCNC: 9 MMOL/L (ref 7–16)
BUN SERPL-MCNC: 33 MG/DL (ref 8–23)
CALCIUM SERPL-MCNC: 8.3 MG/DL (ref 8.3–10.4)
CHLORIDE SERPL-SCNC: 106 MMOL/L (ref 98–107)
CO2 SERPL-SCNC: 22 MMOL/L (ref 21–32)
CREAT SERPL-MCNC: 1.39 MG/DL (ref 0.8–1.5)
ERYTHROCYTE [DISTWIDTH] IN BLOOD BY AUTOMATED COUNT: 13.2 % (ref 11.9–14.6)
GLUCOSE BLD STRIP.AUTO-MCNC: 120 MG/DL (ref 65–100)
GLUCOSE BLD STRIP.AUTO-MCNC: 128 MG/DL (ref 65–100)
GLUCOSE BLD STRIP.AUTO-MCNC: 141 MG/DL (ref 65–100)
GLUCOSE BLD STRIP.AUTO-MCNC: 144 MG/DL (ref 65–100)
GLUCOSE SERPL-MCNC: 154 MG/DL (ref 65–100)
HCT VFR BLD AUTO: 25.6 % (ref 41.1–50.3)
HGB BLD-MCNC: 8.3 G/DL (ref 13.6–17.2)
MCH RBC QN AUTO: 29.9 PG (ref 26.1–32.9)
MCHC RBC AUTO-ENTMCNC: 32.4 G/DL (ref 31.4–35)
MCV RBC AUTO: 92.1 FL (ref 79.6–97.8)
NRBC # BLD: 0 K/UL (ref 0–0.2)
PLATELET # BLD AUTO: 141 K/UL (ref 150–450)
PMV BLD AUTO: 12 FL (ref 9.4–12.3)
POTASSIUM SERPL-SCNC: 3.6 MMOL/L (ref 3.5–5.1)
RBC # BLD AUTO: 2.78 M/UL (ref 4.23–5.6)
SODIUM SERPL-SCNC: 137 MMOL/L (ref 136–145)
WBC # BLD AUTO: 8.4 K/UL (ref 4.3–11.1)

## 2020-08-15 PROCEDURE — 80048 BASIC METABOLIC PNL TOTAL CA: CPT

## 2020-08-15 PROCEDURE — 77030012890

## 2020-08-15 PROCEDURE — 36573 INSJ PICC RS&I 5 YR+: CPT | Performed by: THORACIC SURGERY (CARDIOTHORACIC VASCULAR SURGERY)

## 2020-08-15 PROCEDURE — 71045 X-RAY EXAM CHEST 1 VIEW: CPT

## 2020-08-15 PROCEDURE — 74011250637 HC RX REV CODE- 250/637: Performed by: THORACIC SURGERY (CARDIOTHORACIC VASCULAR SURGERY)

## 2020-08-15 PROCEDURE — 85027 COMPLETE CBC AUTOMATED: CPT

## 2020-08-15 PROCEDURE — 97530 THERAPEUTIC ACTIVITIES: CPT

## 2020-08-15 PROCEDURE — 82962 GLUCOSE BLOOD TEST: CPT

## 2020-08-15 PROCEDURE — 74011250637 HC RX REV CODE- 250/637: Performed by: PHYSICIAN ASSISTANT

## 2020-08-15 PROCEDURE — 74011250636 HC RX REV CODE- 250/636: Performed by: THORACIC SURGERY (CARDIOTHORACIC VASCULAR SURGERY)

## 2020-08-15 PROCEDURE — 02HV33Z INSERTION OF INFUSION DEVICE INTO SUPERIOR VENA CAVA, PERCUTANEOUS APPROACH: ICD-10-PCS | Performed by: THORACIC SURGERY (CARDIOTHORACIC VASCULAR SURGERY)

## 2020-08-15 PROCEDURE — 77030040393 HC DRSG OPTIFOAM GENT MDII -B

## 2020-08-15 PROCEDURE — 65660000004 HC RM CVT STEPDOWN

## 2020-08-15 PROCEDURE — 36415 COLL VENOUS BLD VENIPUNCTURE: CPT

## 2020-08-15 PROCEDURE — 74011000258 HC RX REV CODE- 258: Performed by: THORACIC SURGERY (CARDIOTHORACIC VASCULAR SURGERY)

## 2020-08-15 PROCEDURE — C1751 CATH, INF, PER/CENT/MIDLINE: HCPCS

## 2020-08-15 PROCEDURE — B548ZZA ULTRASONOGRAPHY OF SUPERIOR VENA CAVA, GUIDANCE: ICD-10-PCS | Performed by: THORACIC SURGERY (CARDIOTHORACIC VASCULAR SURGERY)

## 2020-08-15 RX ORDER — SODIUM CHLORIDE 0.9 % (FLUSH) 0.9 %
20 SYRINGE (ML) INJECTION EVERY 8 HOURS
Status: DISCONTINUED | OUTPATIENT
Start: 2020-08-15 | End: 2020-08-18 | Stop reason: HOSPADM

## 2020-08-15 RX ORDER — BETAMETHASONE DIPROPIONATE 0.5 MG/G
CREAM TOPICAL
Status: DISCONTINUED | OUTPATIENT
Start: 2020-08-15 | End: 2020-08-18 | Stop reason: HOSPADM

## 2020-08-15 RX ORDER — AMOXICILLIN 250 MG
2 CAPSULE ORAL
Status: DISCONTINUED | OUTPATIENT
Start: 2020-08-15 | End: 2020-08-15

## 2020-08-15 RX ORDER — SODIUM CHLORIDE 0.9 % (FLUSH) 0.9 %
20 SYRINGE (ML) INJECTION AS NEEDED
Status: DISCONTINUED | OUTPATIENT
Start: 2020-08-15 | End: 2020-08-18 | Stop reason: HOSPADM

## 2020-08-15 RX ORDER — METOPROLOL TARTRATE 25 MG/1
12.5 TABLET, FILM COATED ORAL 2 TIMES DAILY
Status: DISCONTINUED | OUTPATIENT
Start: 2020-08-15 | End: 2020-08-17

## 2020-08-15 RX ORDER — AMOXICILLIN 250 MG
1 CAPSULE ORAL
Status: DISCONTINUED | OUTPATIENT
Start: 2020-08-15 | End: 2020-08-18 | Stop reason: HOSPADM

## 2020-08-15 RX ADMIN — ASPIRIN 81 MG: 81 TABLET, COATED ORAL at 09:02

## 2020-08-15 RX ADMIN — ATORVASTATIN CALCIUM 40 MG: 40 TABLET, FILM COATED ORAL at 21:41

## 2020-08-15 RX ADMIN — OXYBUTYNIN CHLORIDE 5 MG: 5 TABLET ORAL at 09:02

## 2020-08-15 RX ADMIN — Medication 10 ML: at 21:45

## 2020-08-15 RX ADMIN — MENTHOL, METHYL SALICYLATE: 10; 15 CREAM TOPICAL at 21:43

## 2020-08-15 RX ADMIN — ONDANSETRON 4 MG: 2 INJECTION INTRAMUSCULAR; INTRAVENOUS at 02:43

## 2020-08-15 RX ADMIN — FAMOTIDINE 20 MG: 20 TABLET, FILM COATED ORAL at 09:03

## 2020-08-15 RX ADMIN — AMIODARONE HYDROCHLORIDE 400 MG: 200 TABLET ORAL at 21:41

## 2020-08-15 RX ADMIN — Medication 20 ML: at 17:18

## 2020-08-15 RX ADMIN — AMIODARONE HYDROCHLORIDE 0.5 MG/MIN: 50 INJECTION, SOLUTION INTRAVENOUS at 23:37

## 2020-08-15 RX ADMIN — ACETAMINOPHEN 650 MG: 325 TABLET, FILM COATED ORAL at 09:02

## 2020-08-15 RX ADMIN — AMIODARONE HYDROCHLORIDE 400 MG: 200 TABLET ORAL at 09:03

## 2020-08-15 RX ADMIN — Medication 20 ML: at 21:44

## 2020-08-15 RX ADMIN — Medication 10 ML: at 05:07

## 2020-08-15 RX ADMIN — Medication 10 ML: at 02:43

## 2020-08-15 RX ADMIN — POTASSIUM CHLORIDE 20 MEQ: 20 TABLET, EXTENDED RELEASE ORAL at 05:16

## 2020-08-15 RX ADMIN — AMIODARONE HYDROCHLORIDE 0.5 MG/MIN: 50 INJECTION, SOLUTION INTRAVENOUS at 09:05

## 2020-08-15 RX ADMIN — Medication 10 ML: at 17:20

## 2020-08-15 RX ADMIN — POTASSIUM CHLORIDE 20 MEQ: 20 TABLET, EXTENDED RELEASE ORAL at 17:18

## 2020-08-15 RX ADMIN — MENTHOL, METHYL SALICYLATE: 10; 15 CREAM TOPICAL at 09:04

## 2020-08-15 NOTE — PROGRESS NOTES
Bedside shift change report given to Crescencio Montoya RN (oncoming nurse) by myself (offgoing nurse). Report included the following information SBAR, Kardex, Intake/Output, MAR, Recent Results and Cardiac Rhythm A.fib.

## 2020-08-15 NOTE — PROGRESS NOTES
Bedside shift change report given to Robin French RN (oncoming nurse) by Nesha Turner (offgoing nurse). Report included the following information SBAR, Kardex, OR Summary, Intake/Output, MAR, Recent Results and Cardiac Rhythm afib.

## 2020-08-15 NOTE — PROGRESS NOTES
Today's Date: 8/15/2020  Date of Admission: 8/10/2020    Chart Reviewed. Subjective / Interval Events:     Patient feels ok. In-and-out of Afib. On IV amio. +BM. On RA. Medications Reviewed. Objective:     Vitals:    08/14/20 1904 08/14/20 2004 08/14/20 2314 08/15/20 0249   BP: 102/57 131/72 106/55 114/69   Pulse: 86 (!) 113 100 (!) 110   Resp:  18 18 18   Temp:  97.3 °F (36.3 °C) 97 °F (36.1 °C) 98 °F (36.7 °C)   SpO2:  94% 96% 92%   Weight:    98.1 kg (216 lb 4.8 oz)       Intake and Output  Current Shift: 08/14 1901 - 08/15 0700  In: 60 [P.O.:60]  Out: 175 [Urine:175]   Last 3 Shifts: 08/13 0701 - 08/14 1900  In: 2040 [P.O.:2040]  Out: 850 [Urine:850]    Physical Exam:  General: Well Developed, Well Nourished, No Acute Distress, Alert & Oriented x 3, Appropriate mood  Neck: supple, no JVD  Heart: irreg irreg without murmurs or gallops  Lungs: Clear throughout auscultation bilaterally without adventitious sounds  Abd: soft, nontender, nondistended, with good bowel sounds  Ext: no edema bilaterally  Sternal incision: clean, dry, and intact  Skin: warm and dry    LABS  Data Review:   Recent Labs     08/15/20  0334 08/14/20  0342    139   K 3.6 3.9   BUN 33* 32*   CREA 1.39 1.46   * 115*   WBC 8.4 7.8   HGB 8.3* 7.8*   HCT 25.6* 24.1*   * 105*       Estimated Creatinine Clearance: 52 mL/min (based on SCr of 1.39 mg/dL).       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)    on ASA, statin, does not tolerate BB or ACE-I/ARB with low BP, stable on room air, continue PT, follow H&H     Active Problems:    CAD (coronary artery disease) (8/10/2020)  S/p CABG, continue medical therapy        Atherosclerosis of native coronary artery of native heart with unstable angina pectoris ([facility] [address] (8/10/2020)  S/p CABG       Central sleep apnea (8/11/2020)  Pulmonary following, on home BIPAP     Thrombocytopenia  Improved        Acute blood loss anemia (8/13/2020)   pt asymptomatic, repeat CBC in AM      Atrial fibrillation ([facility] [address] (8/14/2020)  Continues in Afib, on IV & oral amiodarone, if in Afib tomorrow will start eliquis. Low dose beta blocker.           Kalyan Bello MD

## 2020-08-15 NOTE — PROGRESS NOTES
Right PIV site hard to touch and red due to amio gtt infusing. Warm compress applied. Switched amio gtt to new PIV on left arm.

## 2020-08-15 NOTE — PROGRESS NOTES
PICC Placement Note    PRE-PROCEDURE VERIFICATION  Correct Procedure: yes. Time out completed with assistant Sheila Dey RN and all persons present in agreement with time out. Correct Site:  yes  Temperature: Temp: 97.5 °F (36.4 °C), Temperature Source: Temp Source: Temporal  Recent Labs     08/15/20  0334   BUN 33*   CREA 1.39   *   WBC 8.4     Allergies: Adhesive and Tamsulosin  Education materials for PICC Care given to patient or family. PROCEDURE DETAIL  A double lumen PICC line was started for vascular access and desire for reliable access. The following documentation is in addition to the PICC properties in the lines/airways flowsheet :  Lot #: [id number]  xylocaine used: yes  Mid-Arm Circumference: 32 (cm)  Internal Catheter Length: 43 (cm)  Internal Catheter Total Length: 43 (cm)  Vein Selection for PICC:right basilic  Central Line Bundle followed yes  Complication Related to Insertion: Patient in a-fib, so we could not get ECG reading with spiked p-waves. Both the insertion guidewire and ECG guidewire were removed intact all ports have positive blood return and were flush well with normal saline. The location of the tip of the PICC is verified using Chest xray. The tip is in the SVC per radiologist reading. See image below.        Line is okay to use: yes

## 2020-08-15 NOTE — PROGRESS NOTES
Problem: Falls - Risk of  Goal: *Absence of Falls  Description: Document Addy Jj Fall Risk and appropriate interventions in the flowsheet. Outcome: Progressing Towards Goal  Note: Fall Risk Interventions:  Mobility Interventions: Bed/chair exit alarm, Communicate number of staff needed for ambulation/transfer, Patient to call before getting OOB, PT Consult for mobility concerns, PT Consult for assist device competence, Strengthening exercises (ROM-active/passive), Utilize walker, cane, or other assistive device    Medication Interventions: Bed/chair exit alarm, Evaluate medications/consider consulting pharmacy, Patient to call before getting OOB, Teach patient to arise slowly    Elimination Interventions: Bed/chair exit alarm, Call light in reach, Patient to call for help with toileting needs, Toileting schedule/hourly rounds, Urinal in reach    History of Falls Interventions: Bed/chair exit alarm, Room close to nurse's station     Problem: Pressure Injury - Risk of  Goal: *Prevention of pressure injury  Description: Document Fab Scale and appropriate interventions in the flowsheet. Outcome: Progressing Towards Goal  Note: Pressure Injury Interventions:  Sensory Interventions: Assess changes in LOC, Keep linens dry and wrinkle-free, Minimize linen layers, Pressure redistribution bed/mattress (bed type), Turn and reposition approx. every two hours (pillows and wedges if needed)    Moisture Interventions: Absorbent underpads, Minimize layers    Activity Interventions: Increase time out of bed, Pressure redistribution bed/mattress(bed type)    Mobility Interventions: HOB 30 degrees or less, Pressure redistribution bed/mattress (bed type), Turn and reposition approx.  every two hours(pillow and wedges)    Nutrition Interventions: Document food/fluid/supplement intake, Offer support with meals,snacks and hydration    Friction and Shear Interventions: HOB 30 degrees or less, Minimize layers

## 2020-08-15 NOTE — PROGRESS NOTES
Bedside report received from [address]. Opportunity for questions, concerns. Patient involved.

## 2020-08-15 NOTE — PROGRESS NOTES
Bedside report given to Fredi Enrique. JULIANN. Opportunity for questions, concerns. Patient involved.

## 2020-08-15 NOTE — PROGRESS NOTES
Problem: Mobility Impaired (Adult and Pediatric)  Goal: *Acute Goals and Plan of Care (Insert Text)  Outcome: Progressing Towards Goal  Note:   1. Mr. Maddison Hernandez will perform supine to sit and sit to supine with bed flat and no rails independently in 7 days. 2.  Mr. Maddison Hernandez will perform sit to stand and bed to chair with least restrictive device independently in 7 days. 3.  Mr. Maddison Hernandez will perform gait with least restrictive device >500 ft independently in 7 days. 4.  Mr. Maddison Hernandez will perform therex to bilateral lower extremities x 25 reps in sitting and standing in 7 days. PHYSICAL THERAPY: Daily Note and AM 8/15/2020  INPATIENT: PT Visit Days : 5  [place] / Product Type: Medicare /       NAME/AGE/GENDER: Zayra Gonzales is a [de-identified] y.o. male   PRIMARY DIAGNOSIS: Atherosclerosis of native coronary artery of native heart with unstable angina pectoris ([facility] [address] [I25.110]  CAD (coronary artery disease) [I25.10]  Atherosclerosis of native coronary artery of native heart with unstable angina pectoris (HCC) [I25.110] S/P CABG x 2 S/P CABG x 2  Procedure(s) (LRB):  CORONARY ARTERY BYPASS GRAFT (CABG X2), LIMA (N/A)  VEIN HARVEST, GREATER SAPHENOUS (Right)  ESOPHAGEAL TRANS ECHOCARDIOGRAM (N/A)  5 Days Post-Op  ICD-10: Treatment Diagnosis:    · Generalized Muscle Weakness (M62.81)  · Difficulty in walking, Not elsewhere classified (R26.2)   Precaution/Allergies:  Adhesive and Tamsulosin      ASSESSMENT:     Mr. Maddison Hernandez presents sitting up in the chair. Agreeable to therapy. Patient is able to scoot to the edge of the recliner without assistance. Sit to stand with stand by assist.  Gait training with rolling walker x 250 feet with steady ray. Patient is returned to the bed with min A for LE's for patient to receive PIC line. RN present throughout treatment and assisted with the portable oxygen tank.   Making progress towards goal.  Patient is very pleasant and cooperative. Mr. Ro Quarles is functioning below baseline and is therefore appropriate for skilled PT to maximize his rehab potential.  Home with HHPT at discharge. This section established at most recent assessment   PROBLEM LIST (Impairments causing functional limitations):  1. Decreased Strength  2. Decreased Transfer Abilities  3. Decreased Ambulation Ability/Technique  4. Decreased Activity Tolerance   INTERVENTIONS PLANNED: (Benefits and precautions of physical therapy have been discussed with the patient.)  1. Bed Mobility  2. Gait Training  3. Therapeutic Activites  4. Therapeutic Exercise/Strengthening  5. Transfer Training     TREATMENT PLAN: Frequency/Duration: twice daily for duration of hospital stay  Rehabilitation Potential For Stated Goals: Good     REHAB RECOMMENDATIONS (at time of discharge pending progress):    Placement: It is my opinion, based on this patient's performance to date, that Mr. Ro Quarles may benefit from [address] after discharge due to the functional deficits listed above that are likely to improve with skilled rehabilitation because he/she has multiple medical issues that affect his/her functional mobility in the community. Equipment:    To be determined. HISTORY:   History of Present Injury/Illness (Reason for Referral):  As above. Past Medical History/Comorbidities:   Mr. Ro Quarles  has a past medical history of Arthritis, Buerger's disease ([facility] [address] CAD (coronary artery disease), Decreased mobility, Gout, Hematuria, microscopic, Hiatal hernia, Personal history of prostate cancer, Prostate cancer ([address] (~2000), and Sleep apnea. He also has no past medical history of Difficult intubation, Malignant hyperthermia due to anesthesia, Nausea & vomiting, or Pseudocholinesterase deficiency.   Mr. Ro Quarles  has a past surgical history that includes pr appendectomy; hx other surgical; hx cyst removal (Left); hx heart catheterization (08/03/2020); hx colonoscopy; hx turp; and hx cataract removal (Bilateral). Social History/Living Environment:   Home Environment: Private residence  # Steps to Enter: 0  One/Two Story Residence: One story  Living Alone: No  Support Systems: Child(canelo)  Patient Expects to be Discharged to[de-identified] Private residence  Current DME Used/Available at Home: [address] [facility] [place], straight(doesnt use it.)  Tub or Shower Type: Shower  Prior Level of Function/Work/Activity:  independent  Age,    Number of Personal Factors/Comorbidities that affect the Plan of Care: 1-2: MODERATE COMPLEXITY   EXAMINATION:   Most Recent Physical Functioning:   Gross Assessment:                  Posture:     Balance:  Sitting: Intact  Standing: Impaired  Standing - Static: Good  Standing - Dynamic : Fair Bed Mobility:     Wheelchair Mobility:     Transfers:  Sit to Stand: Stand-by assistance  Stand to Sit: Stand-by assistance  Gait:     Distance (ft): 250 Feet (ft)  Assistive Device: Gait belt;Walker, rolling  Ambulation - Level of Assistance: Stand-by assistance  Interventions: Safety awareness training;Verbal cues      Body Structures Involved:  1. Muscles Body Functions Affected:  1. Movement Related Activities and Participation Affected:  1. Mobility   Number of elements that affect the Plan of Care: 3: MODERATE COMPLEXITY   CLINICAL PRESENTATION:   Presentation: Evolving clinical presentation with changing clinical characteristics: MODERATE COMPLEXITY   CLINICAL DECISION MAKIN [facility] Inpatient Short Form  How much difficulty does the patient currently have. .. Unable A Lot A Little None   1. Turning over in bed (including adjusting bedclothes, sheets and blankets)? [] 1   [] 2   [x] 3   [] 4   2. Sitting down on and standing up from a chair with arms ( e.g., wheelchair, bedside commode, etc.)   [] 1   [] 2   [x] 3   [] 4   3. Moving from lying on back to sitting on the side of the bed?    [] 1   [] 2   [x] 3   [] 4   How much help from another person does the patient currently need. .. Total A Lot A Little None   4. Moving to and from a bed to a chair (including a wheelchair)? [] 1   [] 2   [x] 3   [] 4   5. Need to walk in hospital room? [] 1   [] 2   [x] 3   [] 4   6. Climbing 3-5 steps with a railing? [] 1   [] 2   [x] 3   [] 4   © 2007, Trustees of [address] [place] [address] [address] [address], under license to [employer]. All rights reserved      Score:  Initial: 18 Most Recent: X (Date: -- )    Interpretation of Tool:  Represents activities that are increasingly more difficult (i.e. Bed mobility, Transfers, Gait). Medical Necessity:     · Patient is expected to demonstrate progress in   · functional technique  ·  to   · increase independence with mobility and gait. · .  Reason for Services/Other Comments:  · Patient   · continues to require present interventions due to patient's inability to function at baseline. · .   Use of outcome tool(s) and clinical judgement create a POC that gives a: Questionable prediction of patient's progress: MODERATE COMPLEXITY            TREATMENT:   (In addition to Assessment/Re-Assessment sessions the following treatments were rendered)   Pre-treatment Symptoms/Complaints:  \"I'm ready\"  Pain: Initial:      Post Session:  0/10     Therapeutic Activity: (    24 min): Therapeutic activities including Chair transfers, Ambulation on level ground and walker safety, posture, and therapeutic exercises at times (noted in chart below) to improve mobility, strength, balance and coordination. Required minimal Safety awareness training;Verbal cues to promote static and dynamic balance in standing and promote coordination of bilateral, lower extremity(s). Therapeutic Exercise: (  minutes):  Exercises per grid below to improve mobility, strength, balance and coordination. Required minimum  verbal cues. Progressed repetitions and complexity of movement as indicated.          Date:  8/13/20 Date:  8/14/20 Date: ACTIVITY/EXERCISE AM PM AM PM AM PM   Seated LAQ 20  20 20     Seated AP 20  20 20     Seated marching 20  20 20     Shoulder shrugs 20  20 20     Gluteal sets 20  20 20     abduction 20  20 20                  Braces/Orthotics/Lines/Etc:   · IV  Treatment/Session Assessment:    · Response to Treatment:  Tolerated well   · Interdisciplinary Collaboration:   o Physical Therapy Assistant  o Registered Nurse  · After treatment position/precautions:   o Up in chair  o Bed/Chair-wheels locked  o Call light within reach  o RN notified  o Family at bedside   · Compliance with Program/Exercises: Compliant all of the time  · Recommendations/Intent for next treatment session: \"Next visit will focus on advancements to more challenging activities and reduction in assistance provided\".   Total Treatment Duration: 24 minutes   PT Patient Time In/Time Out  Time In: 0836  Time Out: 0900    Thomas Pereira, PTA

## 2020-08-16 LAB
ERYTHROCYTE [DISTWIDTH] IN BLOOD BY AUTOMATED COUNT: 13.4 % (ref 11.9–14.6)
HCT VFR BLD AUTO: 23.8 % (ref 41.1–50.3)
HGB BLD-MCNC: 7.9 G/DL (ref 13.6–17.2)
MCH RBC QN AUTO: 30.5 PG (ref 26.1–32.9)
MCHC RBC AUTO-ENTMCNC: 33.2 G/DL (ref 31.4–35)
MCV RBC AUTO: 91.9 FL (ref 79.6–97.8)
NRBC # BLD: 0 K/UL (ref 0–0.2)
PLATELET # BLD AUTO: 148 K/UL (ref 150–450)
PMV BLD AUTO: 11.9 FL (ref 9.4–12.3)
POTASSIUM SERPL-SCNC: 4.1 MMOL/L (ref 3.5–5.1)
RBC # BLD AUTO: 2.59 M/UL (ref 4.23–5.6)
WBC # BLD AUTO: 7.2 K/UL (ref 4.3–11.1)

## 2020-08-16 PROCEDURE — 74011250637 HC RX REV CODE- 250/637: Performed by: THORACIC SURGERY (CARDIOTHORACIC VASCULAR SURGERY)

## 2020-08-16 PROCEDURE — 74011000258 HC RX REV CODE- 258: Performed by: THORACIC SURGERY (CARDIOTHORACIC VASCULAR SURGERY)

## 2020-08-16 PROCEDURE — 85027 COMPLETE CBC AUTOMATED: CPT

## 2020-08-16 PROCEDURE — 97530 THERAPEUTIC ACTIVITIES: CPT

## 2020-08-16 PROCEDURE — 74011250636 HC RX REV CODE- 250/636: Performed by: THORACIC SURGERY (CARDIOTHORACIC VASCULAR SURGERY)

## 2020-08-16 PROCEDURE — 36592 COLLECT BLOOD FROM PICC: CPT

## 2020-08-16 PROCEDURE — 84132 ASSAY OF SERUM POTASSIUM: CPT

## 2020-08-16 PROCEDURE — 77030012890

## 2020-08-16 PROCEDURE — 36430 TRANSFUSION BLD/BLD COMPNT: CPT

## 2020-08-16 PROCEDURE — 86923 COMPATIBILITY TEST ELECTRIC: CPT

## 2020-08-16 PROCEDURE — 86900 BLOOD TYPING SEROLOGIC ABO: CPT

## 2020-08-16 PROCEDURE — 74011250637 HC RX REV CODE- 250/637: Performed by: PHYSICIAN ASSISTANT

## 2020-08-16 PROCEDURE — 65660000004 HC RM CVT STEPDOWN

## 2020-08-16 PROCEDURE — 36415 COLL VENOUS BLD VENIPUNCTURE: CPT

## 2020-08-16 PROCEDURE — P9016 RBC LEUKOCYTES REDUCED: HCPCS

## 2020-08-16 PROCEDURE — 30233N1 TRANSFUSION OF NONAUTOLOGOUS RED BLOOD CELLS INTO PERIPHERAL VEIN, PERCUTANEOUS APPROACH: ICD-10-PCS | Performed by: THORACIC SURGERY (CARDIOTHORACIC VASCULAR SURGERY)

## 2020-08-16 RX ORDER — SODIUM CHLORIDE 9 MG/ML
250 INJECTION, SOLUTION INTRAVENOUS AS NEEDED
Status: DISCONTINUED | OUTPATIENT
Start: 2020-08-16 | End: 2020-08-18 | Stop reason: HOSPADM

## 2020-08-16 RX ADMIN — AMIODARONE HYDROCHLORIDE 0.5 MG/MIN: 50 INJECTION, SOLUTION INTRAVENOUS at 17:54

## 2020-08-16 RX ADMIN — Medication 20 ML: at 21:17

## 2020-08-16 RX ADMIN — ACETAMINOPHEN 650 MG: 325 TABLET, FILM COATED ORAL at 08:52

## 2020-08-16 RX ADMIN — Medication 10 ML: at 06:52

## 2020-08-16 RX ADMIN — AMIODARONE HYDROCHLORIDE 400 MG: 200 TABLET ORAL at 21:16

## 2020-08-16 RX ADMIN — APIXABAN 5 MG: 5 TABLET, FILM COATED ORAL at 08:53

## 2020-08-16 RX ADMIN — FAMOTIDINE 20 MG: 20 TABLET, FILM COATED ORAL at 08:52

## 2020-08-16 RX ADMIN — AMIODARONE HYDROCHLORIDE 400 MG: 200 TABLET ORAL at 08:51

## 2020-08-16 RX ADMIN — ASPIRIN 81 MG: 81 TABLET, COATED ORAL at 08:53

## 2020-08-16 RX ADMIN — MENTHOL, METHYL SALICYLATE: 10; 15 CREAM TOPICAL at 08:53

## 2020-08-16 RX ADMIN — METOPROLOL TARTRATE 12.5 MG: 25 TABLET, FILM COATED ORAL at 21:18

## 2020-08-16 RX ADMIN — APIXABAN 5 MG: 5 TABLET, FILM COATED ORAL at 21:17

## 2020-08-16 RX ADMIN — OXYBUTYNIN CHLORIDE 5 MG: 5 TABLET ORAL at 08:52

## 2020-08-16 RX ADMIN — ATORVASTATIN CALCIUM 40 MG: 40 TABLET, FILM COATED ORAL at 21:17

## 2020-08-16 RX ADMIN — Medication 20 ML: at 06:51

## 2020-08-16 RX ADMIN — Medication 10 ML: at 15:04

## 2020-08-16 NOTE — PROGRESS NOTES
Bedside shift change report given to [address] [place] [address] [place] (oncoming nurse) by Niraj Regan RN (offgoing nurse). Report included the following information SBAR, Kardex, Intake/Output, MAR, Recent Results and Cardiac Rhythm afib.

## 2020-08-16 NOTE — PROGRESS NOTES
V/O received from Dr. Cheyenne Gaytan during his rounds this am to discontinue amiodarone drip and for eliquis 5mg q12hrs. Amiodarone drip discontinued at this time per order.

## 2020-08-16 NOTE — PROGRESS NOTES
Bedside shift change report given to myself (oncoming nurse) by Aaron Garcia (offgoing nurse). Report included the following information SBAR, Kardex, Intake/Output, MAR, Recent Results and Cardiac Rhythm A.fib.     -2nd unit of PRBC @ 125ml/hr.

## 2020-08-16 NOTE — PROGRESS NOTES
Problem: Mobility Impaired (Adult and Pediatric)  Goal: *Acute Goals and Plan of Care (Insert Text)  Outcome: Progressing Towards Goal  Note:   1. Mr. Dalila Gottlieb will perform supine to sit and sit to supine with bed flat and no rails independently in 7 days. 2.  Mr. Dalila Gottlieb will perform sit to stand and bed to chair with least restrictive device independently in 7 days. 3.  Mr. Dalila Gottlieb will perform gait with least restrictive device >500 ft independently in 7 days. 4.  Mr. Dalila Gottlieb will perform therex to bilateral lower extremities x 25 reps in sitting and standing in 7 days. PHYSICAL THERAPY: Daily Note and AM 8/16/2020  INPATIENT: PT Visit Days : 6  [place] / Product Type: Medicare /       NAME/AGE/GENDER: Polly Stockton is a [de-identified] y.o. male   PRIMARY DIAGNOSIS: Atherosclerosis of native coronary artery of native heart with unstable angina pectoris ([facility] [address] [I25.110]  CAD (coronary artery disease) [I25.10]  Atherosclerosis of native coronary artery of native heart with unstable angina pectoris (HCC) [I25.110] S/P CABG x 2 S/P CABG x 2  Procedure(s) (LRB):  CORONARY ARTERY BYPASS GRAFT (CABG X2), LIMA (N/A)  VEIN HARVEST, GREATER SAPHENOUS (Right)  ESOPHAGEAL TRANS ECHOCARDIOGRAM (N/A)  6 Days Post-Op  ICD-10: Treatment Diagnosis:    · Generalized Muscle Weakness (M62.81)  · Difficulty in walking, Not elsewhere classified (R26.2)   Precaution/Allergies:  Adhesive and Tamsulosin      ASSESSMENT:     Mr. Dalila Gottlieb presents sitting up in the chair. Agreeable to therapy. Patient is able to scoot to the edge of the recliner without assistance. He does report feeling weak and unsteady today. Sit to stand with CGA. Patient entered bathroom with walker. Patient required min A to sit on low toilet seat and when finished required a good mod A to stand. Patient then ambulated rolling walker x 250 feet with steady ray and CGA.  Patient is returned to the recliner where he performed a few therapeutic exercises. Patient is making progress goals. Patient is very pleasant and cooperative. Mr. Haylee Rodriguez is functioning below baseline and is therefore appropriate for skilled PT to maximize his rehab potential.  Home with HHPT at discharge. This section established at most recent assessment   PROBLEM LIST (Impairments causing functional limitations):  1. Decreased Strength  2. Decreased Transfer Abilities  3. Decreased Ambulation Ability/Technique  4. Decreased Activity Tolerance   INTERVENTIONS PLANNED: (Benefits and precautions of physical therapy have been discussed with the patient.)  1. Bed Mobility  2. Gait Training  3. Therapeutic Activites  4. Therapeutic Exercise/Strengthening  5. Transfer Training     TREATMENT PLAN: Frequency/Duration: twice daily for duration of hospital stay  Rehabilitation Potential For Stated Goals: Good     REHAB RECOMMENDATIONS (at time of discharge pending progress):    Placement: It is my opinion, based on this patient's performance to date, that Mr. Haylee Rodriguez may benefit from [address] after discharge due to the functional deficits listed above that are likely to improve with skilled rehabilitation because he/she has multiple medical issues that affect his/her functional mobility in the community. Equipment:    To be determined. HISTORY:   History of Present Injury/Illness (Reason for Referral):  As above. Past Medical History/Comorbidities:   Mr. Haylee Rodriguez  has a past medical history of Arthritis, Buerger's disease ([address] CAD (coronary artery disease), Decreased mobility, Gout, Hematuria, microscopic, Hiatal hernia, Personal history of prostate cancer, Prostate cancer ([address] (~2000), and Sleep apnea. He also has no past medical history of Difficult intubation, Malignant hyperthermia due to anesthesia, Nausea & vomiting, or Pseudocholinesterase deficiency.   Mr. Haylee Rodriguez  has a past surgical history that includes pr appendectomy; hx other surgical; hx cyst removal (Left); hx heart catheterization (2020); hx colonoscopy; hx turp; and hx cataract removal (Bilateral). Social History/Living Environment:   Home Environment: Private residence  # Steps to Enter: 0  One/Two Story Residence: One story  Living Alone: No  Support Systems: Child(canelo)  Patient Expects to be Discharged to[de-identified] Private residence  Current DME Used/Available at Home: [place], straight(doesnt use it.)  Tub or Shower Type: Shower  Prior Level of Function/Work/Activity:  independent  Age,    Number of Personal Factors/Comorbidities that affect the Plan of Care: 1-2: MODERATE COMPLEXITY   EXAMINATION:   Most Recent Physical Functioning:   Gross Assessment:                  Posture:     Balance:  Sitting: Intact  Standing: Impaired  Standing - Static: Good  Standing - Dynamic : Fair Bed Mobility:     Wheelchair Mobility:     Transfers:  Sit to Stand: Contact guard assistance(from low toilet seat mod A)  Stand to Sit: Contact guard assistance(to low toilet seat min A)  Gait:     Distance (ft): 250 Feet (ft)  Assistive Device: Gait belt;Walker, rolling  Ambulation - Level of Assistance: Contact guard assistance  Interventions: Safety awareness training;Verbal cues      Body Structures Involved:  1. Muscles Body Functions Affected:  1. Movement Related Activities and Participation Affected:  1. Mobility   Number of elements that affect the Plan of Care: 3: MODERATE COMPLEXITY   CLINICAL PRESENTATION:   Presentation: Evolving clinical presentation with changing clinical characteristics: MODERATE COMPLEXITY   CLINICAL DECISION MAKIN [facility] Inpatient Short Form  How much difficulty does the patient currently have. .. Unable A Lot A Little None   1. Turning over in bed (including adjusting bedclothes, sheets and blankets)? [] 1   [] 2   [x] 3   [] 4   2.   Sitting down on and standing up from a chair with arms ( e.g., wheelchair, bedside commode, etc.)   [] 1   [] 2   [x] 3   [] 4   3. Moving from lying on back to sitting on the side of the bed? [] 1   [] 2   [x] 3   [] 4   How much help from another person does the patient currently need. .. Total A Lot A Little None   4. Moving to and from a bed to a chair (including a wheelchair)? [] 1   [] 2   [x] 3   [] 4   5. Need to walk in hospital room? [] 1   [] 2   [x] 3   [] 4   6. Climbing 3-5 steps with a railing? [] 1   [] 2   [x] 3   [] 4   © 2007, Trustees of [address] [place] [address] [address] [address], under license to [employer]. All rights reserved      Score:  Initial: 18 Most Recent: X (Date: -- )    Interpretation of Tool:  Represents activities that are increasingly more difficult (i.e. Bed mobility, Transfers, Gait). Medical Necessity:     · Patient is expected to demonstrate progress in   · functional technique  ·  to   · increase independence with mobility and gait. · .  Reason for Services/Other Comments:  · Patient   · continues to require present interventions due to patient's inability to function at baseline. · .   Use of outcome tool(s) and clinical judgement create a POC that gives a: Questionable prediction of patient's progress: MODERATE COMPLEXITY            TREATMENT:   (In addition to Assessment/Re-Assessment sessions the following treatments were rendered)   Pre-treatment Symptoms/Complaints:  \"I'm ready\"  Pain: Initial:      Post Session:  0/10     Therapeutic Activity: (    28 min): Therapeutic activities including Chair transfers, Ambulation on level ground and walker safety, posture, and therapeutic exercises at times (noted in chart below) to improve mobility, strength, balance and coordination. Required minimal Safety awareness training;Verbal cues to promote static and dynamic balance in standing and promote coordination of bilateral, lower extremity(s).    Therapeutic Exercise: (  7 minutes):  Exercises per grid below to improve mobility, strength, balance and coordination. Required minimum  verbal cues. Progressed repetitions and complexity of movement as indicated. Date:  8/13/20 Date:  8/14/20 Date:  8-   ACTIVITY/EXERCISE AM PM AM PM AM PM   Seated LAQ 20  20 20 X 20 B    Seated AP 20  20 20 X 20 B    Seated marching 20  20 20 X 20 B    Shoulder shrugs 20  20 20     Gluteal sets 20  20 20 X 20 B    abduction 20  20 20 X 20 B                 Braces/Orthotics/Lines/Etc:   · IV  Treatment/Session Assessment:    · Response to Treatment:  Tolerated well   · Interdisciplinary Collaboration:   o Physical Therapy Assistant  o Registered Nurse  · After treatment position/precautions:   o Up in chair  o Bed/Chair-wheels locked  o Call light within reach  o RN notified   · Compliance with Program/Exercises: Compliant all of the time  · Recommendations/Intent for next treatment session: \"Next visit will focus on advancements to more challenging activities and reduction in assistance provided\".   Total Treatment Duration: 35 minutes   PT Patient Time In/Time Out  Time In: 0935  Time Out: [address] [place] [address]

## 2020-08-16 NOTE — PROGRESS NOTES
Dr. Shana Rivas called informed that -120 needed to resume Amio gtt. , pt c/o of feeling weak and unstable on legs. Hemoglobin has been ranging 7.8 - 8 over the past several days. I received orders for 2 units of PRBC.

## 2020-08-16 NOTE — PROGRESS NOTES
Bedside report received from Rick Owen, [facility]. Opportunity for questions, concerns. Patient involved.

## 2020-08-16 NOTE — PROGRESS NOTES
Today's Date: 8/16/2020  Date of Admission: 8/10/2020    Chart Reviewed. Subjective / Interval Events:     Patient feels ok. Continues in Afib. On IV amio. +BM. On RA. Medications Reviewed. Objective:     Vitals:    08/15/20 1936 08/15/20 1943 08/15/20 2252 08/16/20 0251   BP:  100/72 104/66 107/58   Pulse: 91 82 (!) 115 85   Resp:  18 18 18   Temp:  98.3 °F (36.8 °C) 98.2 °F (36.8 °C) 99 °F (37.2 °C)   SpO2: 95% 95% 95% 99%   Weight:           Intake and Output  Current Shift: 08/15 1901 - 08/16 0700  In: -   Out: 300 [Urine:300]   Last 3 Shifts: 08/14 0701 - 08/15 1900  In: 2098 [P.O.:3860]  Out: 750 [Urine:750]    Physical Exam:  General: Well Developed, Well Nourished, No Acute Distress, Alert & Oriented x 3, Appropriate mood  Neck: supple, no JVD  Heart: irreg irreg without murmurs or gallops  Lungs: Clear throughout auscultation bilaterally without adventitious sounds  Abd: soft, nontender, nondistended, with good bowel sounds  Ext: no edema bilaterally  Sternal incision: clean, dry, and intact  Skin: warm and dry    LABS  Data Review:   Recent Labs     08/16/20  0309 08/15/20  0334 08/14/20  0342   NA  --  137 139   K  --  3.6 3.9   BUN  --  33* 32*   CREA  --  1.39 1.46   GLU  --  154* 115*   WBC 7.2 8.4 7.8   HGB 7.9* 8.3* 7.8*   HCT 23.8* 25.6* 24.1*   * 141* 105*       Estimated Creatinine Clearance: 52 mL/min (based on SCr of 1.39 mg/dL).       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)    on ASA, statin, stable on room air, continue PT, follow H&H     Active Problems:    CAD (coronary artery disease) (8/10/2020)  S/p CABG, continue medical therapy        Atherosclerosis of native coronary artery of native heart with unstable angina pectoris ([facility] [address] (8/10/2020)  S/p CABG       Central sleep apnea (8/11/2020)  Pulmonary following, on home BIPAP     Thrombocytopenia  Improved        Acute blood loss anemia (8/13/2020)   pt asymptomatic, repeat CBC in AM      Atrial fibrillation ([facility] [address] (8/14/2020)  Continues in Afib,D/c IV amio, continue oral amiodarone, start eliquis. Low dose beta blocker.           Megha Abraham MD

## 2020-08-17 LAB
ABO + RH BLD: NORMAL
BLD PROD TYP BPU: NORMAL
BLD PROD TYP BPU: NORMAL
BLOOD GROUP ANTIBODIES SERPL: NORMAL
BPU ID: NORMAL
BPU ID: NORMAL
CROSSMATCH RESULT,%XM: NORMAL
CROSSMATCH RESULT,%XM: NORMAL
ERYTHROCYTE [DISTWIDTH] IN BLOOD BY AUTOMATED COUNT: 13.8 % (ref 11.9–14.6)
HCT VFR BLD AUTO: 29.7 % (ref 41.1–50.3)
HGB BLD-MCNC: 10.1 G/DL (ref 13.6–17.2)
MAGNESIUM SERPL-MCNC: 2 MG/DL (ref 1.8–2.4)
MCH RBC QN AUTO: 30.5 PG (ref 26.1–32.9)
MCHC RBC AUTO-ENTMCNC: 34 G/DL (ref 31.4–35)
MCV RBC AUTO: 89.7 FL (ref 79.6–97.8)
NRBC # BLD: 0.02 K/UL (ref 0–0.2)
PLATELET # BLD AUTO: 167 K/UL (ref 150–450)
PMV BLD AUTO: 11.3 FL (ref 9.4–12.3)
POTASSIUM SERPL-SCNC: 4.1 MMOL/L (ref 3.5–5.1)
RBC # BLD AUTO: 3.31 M/UL (ref 4.23–5.6)
SPECIMEN EXP DATE BLD: NORMAL
STATUS OF UNIT,%ST: NORMAL
STATUS OF UNIT,%ST: NORMAL
UNIT DIVISION, %UDIV: 0
UNIT DIVISION, %UDIV: 0
WBC # BLD AUTO: 7.9 K/UL (ref 4.3–11.1)

## 2020-08-17 PROCEDURE — 97530 THERAPEUTIC ACTIVITIES: CPT

## 2020-08-17 PROCEDURE — 97110 THERAPEUTIC EXERCISES: CPT

## 2020-08-17 PROCEDURE — 74011250637 HC RX REV CODE- 250/637: Performed by: THORACIC SURGERY (CARDIOTHORACIC VASCULAR SURGERY)

## 2020-08-17 PROCEDURE — 77030012890

## 2020-08-17 PROCEDURE — 74011250637 HC RX REV CODE- 250/637: Performed by: PHYSICIAN ASSISTANT

## 2020-08-17 PROCEDURE — 65660000004 HC RM CVT STEPDOWN

## 2020-08-17 PROCEDURE — 97164 PT RE-EVAL EST PLAN CARE: CPT

## 2020-08-17 PROCEDURE — 36592 COLLECT BLOOD FROM PICC: CPT

## 2020-08-17 PROCEDURE — 83735 ASSAY OF MAGNESIUM: CPT

## 2020-08-17 PROCEDURE — 85027 COMPLETE CBC AUTOMATED: CPT

## 2020-08-17 PROCEDURE — 84132 ASSAY OF SERUM POTASSIUM: CPT

## 2020-08-17 RX ADMIN — AMIODARONE HYDROCHLORIDE 400 MG: 200 TABLET ORAL at 20:42

## 2020-08-17 RX ADMIN — Medication 20 ML: at 05:54

## 2020-08-17 RX ADMIN — MENTHOL, METHYL SALICYLATE: 10; 15 CREAM TOPICAL at 20:43

## 2020-08-17 RX ADMIN — Medication 20 ML: at 20:43

## 2020-08-17 RX ADMIN — ASPIRIN 81 MG: 81 TABLET, COATED ORAL at 09:01

## 2020-08-17 RX ADMIN — Medication 20 ML: at 17:33

## 2020-08-17 RX ADMIN — OXYBUTYNIN CHLORIDE 5 MG: 5 TABLET ORAL at 09:01

## 2020-08-17 RX ADMIN — MENTHOL, METHYL SALICYLATE: 10; 15 CREAM TOPICAL at 09:05

## 2020-08-17 RX ADMIN — AMIODARONE HYDROCHLORIDE 400 MG: 200 TABLET ORAL at 09:00

## 2020-08-17 RX ADMIN — APIXABAN 5 MG: 5 TABLET, FILM COATED ORAL at 09:01

## 2020-08-17 RX ADMIN — ATORVASTATIN CALCIUM 40 MG: 40 TABLET, FILM COATED ORAL at 20:42

## 2020-08-17 RX ADMIN — ACETAMINOPHEN 650 MG: 325 TABLET, FILM COATED ORAL at 20:42

## 2020-08-17 RX ADMIN — ACETAMINOPHEN 650 MG: 325 TABLET, FILM COATED ORAL at 17:33

## 2020-08-17 RX ADMIN — APIXABAN 5 MG: 5 TABLET, FILM COATED ORAL at 20:42

## 2020-08-17 RX ADMIN — METOPROLOL TARTRATE 12.5 MG: 25 TABLET, FILM COATED ORAL at 09:01

## 2020-08-17 RX ADMIN — ACETAMINOPHEN 650 MG: 325 TABLET, FILM COATED ORAL at 09:03

## 2020-08-17 RX ADMIN — FAMOTIDINE 20 MG: 20 TABLET, FILM COATED ORAL at 09:00

## 2020-08-17 NOTE — PROGRESS NOTES
Nutrition Assessment     Type and Reason for Visit: Initial, RD nutrition re-screen/LOS   Length of Stay      Nutrition Recommendations/Plan: Recommend continuing with cardiac diet     Nutrition Assessment:  Pt admitted s/p CABGx2. PMH includes CAD. Pt is receiving 1 meal/day from family and consuming ~75% of meals provided from the hospital. Po intakes adequately meet estimated nutrition needs. No nutrition needs at this time    Estimated Daily Nutrient Needs:  Energy (kcal):  1924-2405kcal(20-25kcal/kg (CBW: 96.2kg))  Protein (g):  96-115gm(1-1.2gm/kg (CBW: 96.2kg))         Current Nutrition Therapies:  DIET CARDIAC Regular    Anthropometric Measures:  · Height:  6' 3.98\" (193 cm)  · Current Body Wt:  96.2 kg (212 lb 1.3 oz)  · BMI: 25.8    Nutrition Diagnosis:   No nutrition diagnosis at this time    Nutrition Intervention:  Food and/or Nutrient Delivery: Continue current diet    Goals: Will continue to consume >75% estimated nutrition needs       Nutrition Monitoring and Evaluation:   Food/Nutrient Intake Outcomes: Food and nutrient intake    Discharge Planning:     Too soon to determine     Electronically signed by Camilla Veliz MS, DORIE, TESSA 8/17/2020 at 2:13 PM  Contact: [phone]

## 2020-08-17 NOTE — PROGRESS NOTES
Today's Date: 8/17/2020  Date of Admission: 8/10/2020    Chart Reviewed. Subjective:     Patient feels ok. Appetite is fair. Medications Reviewed. Objective:     Vitals:    08/16/20 2116 08/16/20 2246 08/17/20 0244 08/17/20 0721   BP: 108/56 99/61 112/63 117/64   Pulse: 84 (!) 102 81 75   Resp:  16 16 18   Temp:  97.2 °F (36.2 °C) 97 °F (36.1 °C) 97 °F (36.1 °C)   SpO2:  95% 95% 93%   Weight:   212 lb (96.2 kg)        Intake and Output  Current Shift: 08/17 0701 - 08/17 1900  In: 120 [P.O.:120]  Out: 0    Last 3 Shifts: 08/15 1901 - 08/17 0700  In: 3089.4 [P.O.:1430; I.V.:919.8]  Out: 2125 [Urine:2125]    Physical Exam:  General: Well Developed, Well Nourished, No Acute Distress, Alert & Oriented x 3, Appropriate mood  Neck: supple, no JVD  Heart: S1S2 with IRR without murmurs or gallops  Lungs: Clear throughout auscultation bilaterally without adventitious sounds  Abd: soft, nontender, nondistended, with good bowel sounds  Ext: no edema bilaterally  Sternal incision: clean, dry, and intact  Skin: warm and dry    LABS  Data Review:   Recent Labs     08/17/20  0357 08/16/20  0320 08/16/20  0309 08/15/20  0334   NA  --   --   --  137   K 4.1 4.1  --  3.6   MG 2.0  --   --   --    BUN  --   --   --  33*   CREA  --   --   --  1.39   GLU  --   --   --  154*   WBC 7.9  --  7.2 8.4   HGB 10.1*  --  7.9* 8.3*   HCT 29.7*  --  23.8* 25.6*     --  148* 141*     Estimated Creatinine Clearance: 52 mL/min (by C-G formula based on SCr of 1.39 mg/dL).       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)    On ASA, will discontinue BB given low BP, continue statin, intermittent a-fib, continue PT    Active Problems:    CAD (coronary artery disease) (8/10/2020)  S/p CABG      Atherosclerosis of native coronary artery of native heart with unstable angina pectoris ([facility] [address] (8/10/2020)  S/p CABG     Central sleep apnea (8/11/2020)  Pulmonary following, on home BIPAP     Thrombocytopenia  Resolved        Acute blood loss anemia (8/13/2020)   s/p transfusion with 2 units of PRBCs, monitor        Atrial fibrillation ([facility] [address] (8/14/2020)  Intermittent a-fib this morning but rate controlled, on Eliquis, will transition to oral amiodarone and monitor overnight     Dispo  Possibly home with Sean Young in AM       Fence LYNNE Goddard

## 2020-08-17 NOTE — PROGRESS NOTES
Problem: Mobility Impaired (Adult and Pediatric)  Goal: *Acute Goals and Plan of Care (Insert Text)  Outcome: Progressing Towards Goal  Note:   Modified 8/17/2020   1. Mr. Ata Kwan will perform supine to sit and sit to supine with bed flat and no rails independently in 7 days. 2.  Mr. Ata Kwan will perform sit to stand and bed to chair with least restrictive device independently in 7 days. 3.  Mr. Ata Kwan will perform gait with least restrictive device >500 ft with modified independence and least restrictive device in 7 days. 4.  Mr. Ata Kwan will perform therex to bilateral lower extremities x 25 reps in sitting and standing in 7 days. PHYSICAL THERAPY: Daily Note, Re-evaluation and AM 8/17/2020  INPATIENT: PT Visit Days : 1  [place] / [place] / Product Type: Medicare /       NAME/AGE/GENDER: Channie Nissen is a [de-identified] y.o. male   PRIMARY DIAGNOSIS: Atherosclerosis of native coronary artery of native heart with unstable angina pectoris ([facility] [address] [I25.110]  CAD (coronary artery disease) [I25.10]  Atherosclerosis of native coronary artery of native heart with unstable angina pectoris (HCC) [I25.110] S/P CABG x 2 S/P CABG x 2  Procedure(s) (LRB):  CORONARY ARTERY BYPASS GRAFT (CABG X2), LIMA (N/A)  VEIN HARVEST, GREATER SAPHENOUS (Right)  ESOPHAGEAL TRANS ECHOCARDIOGRAM (N/A)  7 Days Post-Op  ICD-10: Treatment Diagnosis:    · Generalized Muscle Weakness (M62.81)  · Difficulty in walking, Not elsewhere classified (R26.2)   Precaution/Allergies:  Adhesive and Tamsulosin      ASSESSMENT:     Mr. Ata Kwan presents sitting up in the chair. Agreeable to therapy. Scooted anteriorly in chair independently and used urinal.  Patient stood with CGA then transferred to bed with RW and CGA. Sat with CGA then to supine with mod A. Patient required cueing for correct sequencing with sit to supine, instructed patient to lie sidelying first but he had difficulty.  Supine to sit from flat bed with SBA, then required mod A and cueing for hip hiking to scoot anteriorly on bed. Patient stood with CGA then ambulated with rolling walker x 250 feet with steady ray and CGA with steppage gait (no heel to toe roll off). HR increased from 81 to 103. During ambulation patient cued to increase upright posture and stay closer to RW. Patient is returned to the recliner to rest.  Patient is making progress toward goals, still requiring significant assistance with bed mobility. Patient is very pleasant and cooperative. Goals modified slightly. Mr. Facundo Means is functioning below baseline and is therefore appropriate for skilled PT to maximize his rehab potential.  Patient would definitely benefit from continued PT but is insistent on returning home at discharge. Seems to have good family support and assistance so recommend Home with HHPT at discharge. This section established at most recent assessment   PROBLEM LIST (Impairments causing functional limitations):  1. Decreased Strength  2. Decreased Transfer Abilities  3. Decreased Ambulation Ability/Technique  4. Decreased Activity Tolerance   INTERVENTIONS PLANNED: (Benefits and precautions of physical therapy have been discussed with the patient.)  1. Bed Mobility  2. Gait Training  3. Therapeutic Activites  4. Therapeutic Exercise/Strengthening  5. Transfer Training     TREATMENT PLAN: Frequency/Duration: twice daily for duration of hospital stay  Rehabilitation Potential For Stated Goals: Good     REHAB RECOMMENDATIONS (at time of discharge pending progress):    Placement: It is my opinion, based on this patient's performance to date, that Mr. Facundo Means may benefit from [address] after discharge due to the functional deficits listed above that are likely to improve with skilled rehabilitation because he/she has multiple medical issues that affect his/her functional mobility in the community.   Equipment:    To be determined. HISTORY:   History of Present Injury/Illness (Reason for Referral):  As above. Past Medical History/Comorbidities:   Mr. Jorge Avalos  has a past medical history of Arthritis, Buerger's disease ([facility] [address] CAD (coronary artery disease), Decreased mobility, Gout, Hematuria, microscopic, Hiatal hernia, Personal history of prostate cancer, Prostate cancer ([facility] [address] (~2000), and Sleep apnea. He also has no past medical history of Difficult intubation, Malignant hyperthermia due to anesthesia, Nausea & vomiting, or Pseudocholinesterase deficiency. Mr. Jorge Avalos  has a past surgical history that includes pr appendectomy; hx other surgical; hx cyst removal (Left); hx heart catheterization (08/03/2020); hx colonoscopy; hx turp; and hx cataract removal (Bilateral). Social History/Living Environment:   Home Environment: Private residence  # Steps to Enter: 0  One/Two Story Residence: One story  Living Alone: No  Support Systems: Child(canelo)  Patient Expects to be Discharged to[de-identified] Private residence  Current DME Used/Available at Home: Brenda Drop, straight(doesnt use it.)  Tub or Shower Type: Shower  Prior Level of Function/Work/Activity:  Lives with wife and son, independent, uses no assistive device for ambulation. Age,    Number of Personal Factors/Comorbidities that affect the Plan of Care: 1-2: MODERATE COMPLEXITY   EXAMINATION:   Most Recent Physical Functioning:   Gross Assessment:                  Posture:     Balance:  Sitting: Intact  Standing: Impaired  Standing - Static: Fair  Standing - Dynamic : Fair Bed Mobility:  Supine to Sit: Moderate assistance; Additional time  Sit to Supine: Stand-by assistance  Scooting: Moderate assistance; Additional time  Duration: 23 Minutes  Wheelchair Mobility:     Transfers:  Sit to Stand: Stand-by assistance  Stand to Sit: Stand-by assistance  Bed to Chair: Stand-by assistance  Gait:     Speed/Alesia: Slow  Step Length: Right shortened;Left shortened  Gait Abnormalities: Steppage gait; Shuffling gait; Decreased step clearance  Distance (ft): 250 Feet (ft)  Assistive Device: Walker, rolling;Gait belt  Ambulation - Level of Assistance: Contact guard assistance      Body Structures Involved:  1. Muscles Body Functions Affected:  1. Movement Related Activities and Participation Affected:  1. Mobility   Number of elements that affect the Plan of Care: 3: MODERATE COMPLEXITY   CLINICAL PRESENTATION:   Presentation: Evolving clinical presentation with changing clinical characteristics: MODERATE COMPLEXITY   CLINICAL DECISION MAKIN [facility] Inpatient Short Form  How much difficulty does the patient currently have. .. Unable A Lot A Little None   1. Turning over in bed (including adjusting bedclothes, sheets and blankets)? [] 1   [] 2   [x] 3   [] 4   2. Sitting down on and standing up from a chair with arms ( e.g., wheelchair, bedside commode, etc.)   [] 1   [] 2   [x] 3   [] 4   3. Moving from lying on back to sitting on the side of the bed? [] 1   [] 2   [x] 3   [] 4   How much help from another person does the patient currently need. .. Total A Lot A Little None   4. Moving to and from a bed to a chair (including a wheelchair)? [] 1   [] 2   [x] 3   [] 4   5. Need to walk in hospital room? [] 1   [] 2   [x] 3   [] 4   6. Climbing 3-5 steps with a railing? [] 1   [] 2   [x] 3   [] 4   © , Trustees of [address] [place] [address], under license to [employer]. All rights reserved      Score:  Initial: 18 Most Recent: X (Date: -- )    Interpretation of Tool:  Represents activities that are increasingly more difficult (i.e. Bed mobility, Transfers, Gait). Medical Necessity:     · Patient is expected to demonstrate progress in   · functional technique  ·  to   · increase independence with mobility and gait.    · .  Reason for Services/Other Comments:  · Patient   · continues to require present interventions due to patient's inability to function at baseline. · .   Use of outcome tool(s) and clinical judgement create a POC that gives a: Questionable prediction of patient's progress: MODERATE COMPLEXITY            TREATMENT:   (In addition to Assessment/Re-Assessment sessions the following treatments were rendered)   Pre-treatment Symptoms/Complaints:  \"OK\"  Pain: Initial:   Pain Intensity 1: 3  Pain Location 1: Shoulder  Pain Orientation 1: Left  Post Session:  3/10     Therapeutic Activity: (23 Minutes  ):  Therapeutic activities including bed mobility, Chair transfers, Ambulation on level ground and walker safety, posture, and therapeutic exercises at times (noted in chart below) to improve mobility, strength, balance and coordination. Required minimal   to promote static and dynamic balance in standing and promote coordination of bilateral, lower extremity(s). Date:  8/13/20 Date:  8/14/20 Date:  8-   ACTIVITY/EXERCISE AM PM AM PM AM PM   Seated LAQ 20  20 20 X 20 B    Seated AP 20  20 20 X 20 B    Seated marching 20  20 20 X 20 B    Shoulder shrugs 20  20 20     Gluteal sets 20  20 20 X 20 B    abduction 20  20 20 X 20 B                 Braces/Orthotics/Lines/Etc:   · IV  Treatment/Session Assessment:    · Response to Treatment:  Tolerated well   · Interdisciplinary Collaboration:   o Physical Therapist  o Physical Therapy Assistant  o Registered Nurse  · After treatment position/precautions:   o Up in chair  o Bed/Chair-wheels locked  o Caregiver at bedside  o Call light within reach  o RN notified  o Family at bedside   · Compliance with Program/Exercises: Compliant all of the time  · Recommendations/Intent for next treatment session: \"Next visit will focus on advancements to more challenging activities and reduction in assistance provided\".   Total Treatment Duration: 35 minutes   PT Patient Time In/Time Out  Time In: 1255  Time Out: Temo PT, DPT

## 2020-08-17 NOTE — PROGRESS NOTES
Problem: Falls - Risk of  Goal: *Absence of Falls  Description: Document Addy Jj Fall Risk and appropriate interventions in the flowsheet. Outcome: Progressing Towards Goal  Note: Fall Risk Interventions:  Mobility Interventions: Bed/chair exit alarm, Communicate number of staff needed for ambulation/transfer, Patient to call before getting OOB, PT Consult for mobility concerns, PT Consult for assist device competence, Strengthening exercises (ROM-active/passive)    Medication Interventions: Bed/chair exit alarm, Evaluate medications/consider consulting pharmacy, Patient to call before getting OOB, Teach patient to arise slowly    Elimination Interventions: Bed/chair exit alarm, Call light in reach, Patient to call for help with toileting needs, Urinal in reach    History of Falls Interventions: Bed/chair exit alarm    Problem: Pressure Injury - Risk of  Goal: *Prevention of pressure injury  Description: Document Fab Scale and appropriate interventions in the flowsheet. Outcome: Progressing Towards Goal  Note: Pressure Injury Interventions:  Sensory Interventions: Assess changes in LOC, Pressure redistribution bed/mattress (bed type)    Moisture Interventions: Absorbent underpads, Apply protective barrier, creams and emollients, Check for incontinence Q2 hours and as needed, Minimize layers    Activity Interventions: Chair cushion, Increase time out of bed, Pressure redistribution bed/mattress(bed type), PT/OT evaluation    Mobility Interventions: HOB 30 degrees or less, Pressure redistribution bed/mattress (bed type), PT/OT evaluation, Turn and reposition approx.  every two hours(pillow and wedges)    Nutrition Interventions: Document food/fluid/supplement intake, Offer support with meals,snacks and hydration    Friction and Shear Interventions: Apply protective barrier, creams and emollients, Minimize layers

## 2020-08-17 NOTE — PROGRESS NOTES
Spoke with patient in regards to BiPAP tolerance over the weekend. Patient stated he slept well and did not have any complaints; was excited to have good sleep.

## 2020-08-17 NOTE — PROGRESS NOTES
Bedside shift change report given to Nato Reinoso RN (oncoming nurse) by myself (offgoing nurse). Report included the following information SBAR, Kardex, Procedure Summary, Intake/Output, MAR, Recent Results and Cardiac Rhythm A.fib.

## 2020-08-17 NOTE — PROGRESS NOTES
Problem: Mobility Impaired (Adult and Pediatric)  Goal: *Acute Goals and Plan of Care (Insert Text)  Outcome: Progressing Towards Goal  Note:   Modified 8/17/2020   1. Mr. Rossi Blakely will perform supine to sit and sit to supine with bed flat and no rails independently in 7 days. 2.  Mr. Rossi Blakely will perform sit to stand and bed to chair with least restrictive device independently in 7 days. 3.  Mr. Rossi Blakely will perform gait with least restrictive device >500 ft with modified independence and least restrictive device in 7 days. 4.  Mr. Rossi Blakely will perform therex to bilateral lower extremities x 25 reps in sitting and standing in 7 days. PHYSICAL THERAPY: Daily Note, Re-evaluation and PM 8/17/2020  INPATIENT: PT Visit Days : 1  [place] / [place] / Product Type: Medicare /       NAME/AGE/GENDER: Stefanie Kamara is a [de-identified] y.o. male   PRIMARY DIAGNOSIS: Atherosclerosis of native coronary artery of native heart with unstable angina pectoris ([facility] [address] [I25.110]  CAD (coronary artery disease) [I25.10]  Atherosclerosis of native coronary artery of native heart with unstable angina pectoris (HCC) [I25.110] S/P CABG x 2 S/P CABG x 2  Procedure(s) (LRB):  CORONARY ARTERY BYPASS GRAFT (CABG X2), LIMA (N/A)  VEIN HARVEST, GREATER SAPHENOUS (Right)  ESOPHAGEAL TRANS ECHOCARDIOGRAM (N/A)  7 Days Post-Op  ICD-10: Treatment Diagnosis:    · Generalized Muscle Weakness (M62.81)  · Difficulty in walking, Not elsewhere classified (R26.2)   Precaution/Allergies:  Adhesive and Tamsulosin      ASSESSMENT:     Mr. Rossi Blakely presents sitting up in the chair. Agreeable to therapy. Scooted anteriorly in chair independently and used urinal.  Patient stood with CGA then transferred to bed with RW and CGA. Sat with CGA then to supine with mod A. Patient required cueing for correct sequencing with sit to supine, instructed patient to lie sidelying first but he had difficulty.  Supine to sit from flat bed with SBA, then required mod A and cueing for hip hiking to scoot anteriorly on bed. Patient stood with CGA then ambulated with rolling walker x 250 feet with steady ray and CGA with steppage gait (no heel to toe roll off). HR increased from 81 to 103. During ambulation patient cued to increase upright posture and stay closer to RW. Patient is returned to the recliner to rest.  Patient is making progress toward goals, still requiring significant assistance with bed mobility. Patient is very pleasant and cooperative. Goals modified slightly. Mr. Sukumar Monsivais is functioning below baseline and is therefore appropriate for skilled PT to maximize his rehab potential.  Patient would definitely benefit from continued PT but is insistent on returning home at discharge. Seems to have good family support and assistance so recommend Home with HHPT at discharge. PM note:  Patient is agreeable to therapy this pm.  He is able to maintain am gait distance  And performs exercises. He is assisted to the bathroom during this treatment session. Good session. Making progress towards goals. Will continue PT efforts. This section established at most recent assessment   PROBLEM LIST (Impairments causing functional limitations):  1. Decreased Strength  2. Decreased Transfer Abilities  3. Decreased Ambulation Ability/Technique  4. Decreased Activity Tolerance   INTERVENTIONS PLANNED: (Benefits and precautions of physical therapy have been discussed with the patient.)  1. Bed Mobility  2. Gait Training  3. Therapeutic Activites  4. Therapeutic Exercise/Strengthening  5. Transfer Training     TREATMENT PLAN: Frequency/Duration: twice daily for duration of hospital stay  Rehabilitation Potential For Stated Goals: Good     REHAB RECOMMENDATIONS (at time of discharge pending progress):    Placement:   It is my opinion, based on this patient's performance to date, that Mr. Sukumar Monsivais may benefit from [address] THERAPY after discharge due to the functional deficits listed above that are likely to improve with skilled rehabilitation because he/she has multiple medical issues that affect his/her functional mobility in the community. Equipment:    To be determined. HISTORY:   History of Present Injury/Illness (Reason for Referral):  As above. Past Medical History/Comorbidities:   Mr. Ro Quarles  has a past medical history of Arthritis, Buerger's disease ([facility] [address] CAD (coronary artery disease), Decreased mobility, Gout, Hematuria, microscopic, Hiatal hernia, Personal history of prostate cancer, Prostate cancer ([facility] [address] (~2000), and Sleep apnea. He also has no past medical history of Difficult intubation, Malignant hyperthermia due to anesthesia, Nausea & vomiting, or Pseudocholinesterase deficiency. Mr. Ro Quarles  has a past surgical history that includes pr appendectomy; hx other surgical; hx cyst removal (Left); hx heart catheterization (08/03/2020); hx colonoscopy; hx turp; and hx cataract removal (Bilateral). Social History/Living Environment:   Home Environment: Private residence  # Steps to Enter: 0  One/Two Story Residence: One story  Living Alone: No  Support Systems: Child(canelo)  Patient Expects to be Discharged to[de-identified] Private residence  Current DME Used/Available at Home: jose Pandey(doesnt use it.)  Tub or Shower Type: Shower  Prior Level of Function/Work/Activity:  Lives with wife and son, independent, uses no assistive device for ambulation.   Age,    Number of Personal Factors/Comorbidities that affect the Plan of Care: 1-2: MODERATE COMPLEXITY   EXAMINATION:   Most Recent Physical Functioning:   Gross Assessment:                  Posture:     Balance:  Sitting: Intact  Standing: Impaired  Standing - Static: Fair  Standing - Dynamic : Fair Bed Mobility:     Wheelchair Mobility:     Transfers:  Sit to Stand: Stand-by assistance  Stand to Sit: Stand-by assistance  Gait:     Speed/Alesia: Slow  Distance (ft): 200 Feet (ft)  Assistive Device: Walker, rolling  Ambulation - Level of Assistance: Contact guard assistance      Body Structures Involved:  1. Muscles Body Functions Affected:  1. Movement Related Activities and Participation Affected:  1. Mobility   Number of elements that affect the Plan of Care: 3: MODERATE COMPLEXITY   CLINICAL PRESENTATION:   Presentation: Evolving clinical presentation with changing clinical characteristics: MODERATE COMPLEXITY   CLINICAL DECISION MAKIN [facility] Mobility Inpatient Short Form  How much difficulty does the patient currently have. .. Unable A Lot A Little None   1. Turning over in bed (including adjusting bedclothes, sheets and blankets)? [] 1   [] 2   [x] 3   [] 4   2. Sitting down on and standing up from a chair with arms ( e.g., wheelchair, bedside commode, etc.)   [] 1   [] 2   [x] 3   [] 4   3. Moving from lying on back to sitting on the side of the bed? [] 1   [] 2   [x] 3   [] 4   How much help from another person does the patient currently need. .. Total A Lot A Little None   4. Moving to and from a bed to a chair (including a wheelchair)? [] 1   [] 2   [x] 3   [] 4   5. Need to walk in hospital room? [] 1   [] 2   [x] 3   [] 4   6. Climbing 3-5 steps with a railing? [] 1   [] 2   [x] 3   [] 4   © , Trustees of [address] [place] [address], under license to [employer]. All rights reserved      Score:  Initial: 18 Most Recent: X (Date: -- )    Interpretation of Tool:  Represents activities that are increasingly more difficult (i.e. Bed mobility, Transfers, Gait). Medical Necessity:     · Patient is expected to demonstrate progress in   · functional technique  ·  to   · increase independence with mobility and gait. · .  Reason for Services/Other Comments:  · Patient   · continues to require present interventions due to patient's inability to function at baseline.    · .   Use of outcome tool(s) and clinical judgement create a POC that gives a: Questionable prediction of patient's progress: MODERATE COMPLEXITY            TREATMENT:   (In addition to Assessment/Re-Assessment sessions the following treatments were rendered)   Pre-treatment Symptoms/Complaints:  \"OK\"  Pain: Initial:   Pain Intensity 1: 0  Post Session:  0/10     Therapeutic Activity: (12 minutes  ):  Therapeutic activities including bed mobility, Chair transfers, Ambulation on level ground and walker safety, posture, and therapeutic exercises at times (noted in chart below) to improve mobility, strength, balance and coordination. Required minimal   to promote static and dynamic balance in standing and promote coordination of bilateral, lower extremity(s). Therapeutic Exercise: ( 12 minutes):  Exercises per grid below to improve mobility, strength, balance and coordination. Required min  verbal cues. Progressed repetitions and complexity of movement as indicated. Date:  8/13/20 Date:  8/14/20 Date:  8-17/20   ACTIVITY/EXERCISE AM PM AM PM AM PM   Seated LAQ 20  20 20  20   Seated AP 20  20 20  20   Seated marching 20  20 20  20   Shoulder shrugs 20  20 20  20   Gluteal sets 20  20 20  20   abduction 20  20 20  20                Braces/Orthotics/Lines/Etc:   ·   Treatment/Session Assessment:    · Response to Treatment:  Tolerated well   · Interdisciplinary Collaboration:   o Physical Therapy Assistant  o Registered Nurse  · After treatment position/precautions:   o Up in chair  o Bed/Chair-wheels locked  o Caregiver at bedside  o Call light within reach  o RN notified  o Family at bedside   · Compliance with Program/Exercises: Compliant all of the time  · Recommendations/Intent for next treatment session: \"Next visit will focus on advancements to more challenging activities and reduction in assistance provided\".   Total Treatment Duration: 35 minutes   PT Patient Time In/Time Out  Time In: 2169  Time Out: 7329    Shawna Jimenez, PTA

## 2020-08-17 NOTE — PROGRESS NOTES
CM continues to follow for discharge planning and/or CM needs. Plan remains that pt will transition back home with spouse when stable for discharge. No additional CM needs voice at this time. Will continue to monitor.

## 2020-08-18 VITALS
HEIGHT: 76 IN | WEIGHT: 213 LBS | RESPIRATION RATE: 17 BRPM | HEART RATE: 95 BPM | TEMPERATURE: 97.1 F | SYSTOLIC BLOOD PRESSURE: 116 MMHG | OXYGEN SATURATION: 92 % | BODY MASS INDEX: 25.94 KG/M2 | DIASTOLIC BLOOD PRESSURE: 61 MMHG

## 2020-08-18 PROCEDURE — 77030012890

## 2020-08-18 PROCEDURE — 74011250637 HC RX REV CODE- 250/637: Performed by: THORACIC SURGERY (CARDIOTHORACIC VASCULAR SURGERY)

## 2020-08-18 PROCEDURE — 74011250637 HC RX REV CODE- 250/637: Performed by: PHYSICIAN ASSISTANT

## 2020-08-18 RX ORDER — NITROGLYCERIN 0.4 MG/1
0.4 TABLET SUBLINGUAL
Qty: 2 BOTTLE | Refills: 3 | Status: SHIPPED | OUTPATIENT
Start: 2020-08-18 | End: 2020-10-14

## 2020-08-18 RX ORDER — AMIODARONE HYDROCHLORIDE 200 MG/1
TABLET ORAL
Qty: 30 TAB | Refills: 0 | Status: SHIPPED | OUTPATIENT
Start: 2020-08-18 | End: 2020-08-29

## 2020-08-18 RX ORDER — ATORVASTATIN CALCIUM 40 MG/1
40 TABLET, FILM COATED ORAL
Qty: 90 TAB | Refills: 3 | Status: SHIPPED | OUTPATIENT
Start: 2020-08-18 | End: 2020-08-25

## 2020-08-18 RX ORDER — ACETAMINOPHEN 325 MG/1
650 TABLET ORAL
Status: SHIPPED | COMMUNITY
Start: 2020-08-18 | End: 2020-10-14

## 2020-08-18 RX ADMIN — Medication 20 ML: at 06:00

## 2020-08-18 RX ADMIN — APIXABAN 5 MG: 5 TABLET, FILM COATED ORAL at 09:03

## 2020-08-18 RX ADMIN — OXYBUTYNIN CHLORIDE 5 MG: 5 TABLET ORAL at 09:03

## 2020-08-18 RX ADMIN — ACETAMINOPHEN 650 MG: 325 TABLET, FILM COATED ORAL at 09:03

## 2020-08-18 RX ADMIN — ASPIRIN 81 MG: 81 TABLET, COATED ORAL at 09:03

## 2020-08-18 RX ADMIN — AMIODARONE HYDROCHLORIDE 400 MG: 200 TABLET ORAL at 09:03

## 2020-08-18 RX ADMIN — FAMOTIDINE 20 MG: 20 TABLET, FILM COATED ORAL at 09:03

## 2020-08-18 NOTE — DISCHARGE SUMMARY
Discharge Summary     Patient ID:  Lyndell Hashimoto  [id number]  61 y.o.  1940    Admit date: 8/10/2020    Discharge date:  8/18/2020    Admitting Physician: Smith Ghotra MD     Discharge Physician: ASHUTOSH Zambrano/Dr. Juni Quiñonez    Admission Diagnoses: Atherosclerosis of native coronary artery of native heart with unstable angina pectoris ([facility] [address] [I25.110]  CAD (coronary artery disease) [I25.10]  Atherosclerosis of native coronary artery of native heart with unstable angina pectoris [facility]) [I25.110]    Discharge Diagnoses:   Patient Active Problem List    Diagnosis Date Noted    Atrial fibrillation ([facility] [address] 08/14/2020    Acute blood loss anemia 08/13/2020    Thrombocytopenia ([facility] [address] 08/12/2020    Central sleep apnea 08/11/2020    CAD (coronary artery disease) 08/10/2020    Atherosclerosis of native coronary artery of native heart with unstable angina pectoris ([facility] [address] 08/10/2020    S/P CABG x 2 08/10/2020    Abnormal cardiovascular stress test 07/31/2020    Precordial pain 07/09/2020    EKG, abnormal 07/09/2020    Obstructive sleep apnea syndrome 05/12/2020    Renal cyst 04/13/2018       Procedures this admission:  Coronary Artery Bypass Graft Surgery   Consults: [facility] DR AXEL NEFF Course: Patient presented for elective CABG. He was seen in the office by Dr. German Trinidad in follow up. He has noted some chest pain, right arm pain and progressive fatigue. Nuclear stress test showed areas of ischemia towards the inferior septal all the way to the apical region and some lateral ischemia. LHC was planned. He underwent cardiac catheterization that showed obstructive LM disease. Recent echo showed normal LV EF with mild MR. He stated he woke up with chest discomfort on one occasion. He did not correlate chest pain with exertion but stated he has not been able to exert himself very much for some time.  He felt very fatigued after walking to the mailbox and back which is approximately 200 feet. He has a new tremor and is seeing neurology later this month. He stated he was told to use a cane but usually doesn't. His daughter stated he had suffered a few falls over the last year. High risk PCI versus CABG surgery was discussed at length with the patient and his daughter. He wished to proceed with CABG surgery. He underwent CABG X 2 with LIMA to LAD and reverse SVG to the OM on 8/10/20. He did well post operatively and was transferred to CV stepdown on POD 1. He was hypotensive and BB was held. He had thrombocytopenia. He developed atrial fibrillation with RVR and was started on IV amiodarone. He continued to have intermittent a-fib. He was transfused for anemia. He was then started on Eliquis for anticoagulation. He remained in a-fib but rate was better controlled. He was transitioned back to oral amiodarone. He remained in rate controlled a-fib. He was hypotensive on low dose BB. He progressed well with PT. The morning of 8/18/20, patient was feeling well and was determined stable and ready for discharge. Patient was instructed on the importance of medication compliance and outpatient follow up. For maximized medical therapy for CAD, patient will continue ASA and statin as well. He is not on BB due to hypotension on low dose BB. He is not on ACE-I/ARB due to hypotension. He was started on statin therapy. He will continue oral amiodarone and Eliquis at discharge. He will have repeat CBC prior to follow up with Dr. Oscar Mena. The patient will have close transitional care follow up with [facility] Dr. Oscar Mena on August 25th at 1:30pm ([address]). The patient will follow up with Dr. Dev Dominguez on September 9th at 3:00pm and has been referred to cardiac rehab. DISPOSITION: The patient is being discharged home with home health services in stable condition on a low saturated fat, low cholesterol and low salt diet.  The patient is instructed to advance activities as tolerated to the limit of fatigue or shortness of breath. The patient is instructed to avoid all heavy lifting or activities that strain the chest or upper arm muscles. Strenuous activity should be avoided. The patient is instructed to watch for signs of infection which include: increasing area of redness, fever or purulent drainage at the incision site. The patient is instructed to call the office or return to the ER for immediate evaluation for any shortness of breath or chest pain not relieved by NTG. Discharge Exam:   Visit Vitals  /61   Pulse 95   Temp 97.1 °F (36.2 °C)   Resp 17   Ht 6' 3.98\" (1.93 m)   Wt 213 lb (96.6 kg)   SpO2 92%   BMI 25.94 kg/m²         Physical Exam:  General: Well Developed, Well Nourished, No Acute Distress, Alert & Oriented  Neck: supple, no JVD  Heart: S1S2 with IRR without murmurs or gallops  Lungs: Clear throughout auscultation bilaterally without adventitious sounds  Abd: soft, nontender, nondistended, with good bowel sounds  Ext: warm, no edema  Sternal incision: clean, dry, and intact  Skin: warm and dry      Patient Instructions:   Current Discharge Medication List      START taking these medications    Details   acetaminophen (TYLENOL) 325 mg tablet Take 2 Tabs by mouth every six (6) hours as needed for Pain. atorvastatin (LIPITOR) 40 mg tablet Take 1 Tab by mouth nightly. Qty: 90 Tab, Refills: 3      apixaban (ELIQUIS) 5 mg tablet Take 1 Tab by mouth every twelve (12) hours. Qty: 60 Tab, Refills: 1      nitroglycerin (NITROSTAT) 0.4 mg SL tablet 1 Tab by SubLINGual route every five (5) minutes as needed for Chest Pain. Up to 3 doses. Qty: 2 Bottle, Refills: 3         CONTINUE these medications which have CHANGED    Details   amiodarone (CORDARONE) 200 mg tablet Take 2 Tabs by mouth every twelve (12) hours for 4 days, THEN 1 Tab every twelve (12) hours for 7 days.   Qty: 30 Tab, Refills: 0         CONTINUE these medications which have NOT CHANGED    Details aspirin 81 mg chewable tablet Take 81 mg by mouth daily. Take morning of procedure. solifenacin (VESICARE) 5 mg tablet Take 1 Tab by mouth daily. Qty: 90 Tab, Refills: 3      cyanocobalamin (VITAMIN B12) 1,000 mcg/mL injection Use once weekly for 4 weeks IM, then monthly  Qty: 10 Vial, Refills: 5    Associated Diagnoses: Vitamin B 12 deficiency      cholecalciferol (Vitamin D3) 25 mcg (1,000 unit) cap Take  by mouth daily. betamethasone dipropionate (DIPROLENE) 0.05 % ointment Apply  to affected area two (2) times a day. Pull back foreskin with each application.   Qty: 45 g, Refills: 1         STOP taking these medications       metoprolol tartrate (LOPRESSOR) 25 mg tablet Comments:   Reason for Stopping:                 Signed:  Kelli Hodges PA-C  8/18/2020  9:27 AM

## 2020-08-18 NOTE — PROGRESS NOTES
Bedside shift change report given to Pj Orantes RN (oncoming nurse) by Drake Jara (offgoing nurse). Report included the following information SBAR, Kardex, OR Summary, Intake/Output, MAR, Recent Results and Cardiac Rhythm afib.

## 2020-08-18 NOTE — PROGRESS NOTES
Discharge instructions, follow up appointments and prescriptions reviewed with patient and family. Both verbalize understanding. All personal belongings taken with patient. Family member will drive patient home. Patient escorted to discharge area via wheelchair. Patient is stable at discharge. PICC line and monitor removed. Esign feature is not currently working. Patient and family stated the have no further questions and they know to call Dr. Lake Rangel office if they do have a question.

## 2020-08-18 NOTE — PROGRESS NOTES
Bedside shift change report given to Oh Denton RN (oncoming nurse) by Mayte Roberts (offgoing nurse). Report included the following information SBAR, Kardex, OR Summary, Intake/Output, MAR, Recent Results and Cardiac Rhythm afib.

## 2020-08-18 NOTE — PROGRESS NOTES
Pt with discharge orders this day. Pt to return home with spouse and support from daughter and family. Home health referral already made to [facility] and updated clinicals faxed this day. No additional CM needs voiced at discharge. Milestones met. Care Management Interventions  PCP Verified by CM: Yes(Trell Katina)  Mode of Transport at Discharge:  Other (see comment)(spouse)  Transition of Care Consult (CM Consult): Discharge Planning, [address]: No  Discharge Durable Medical Equipment: No  Physical Therapy Consult: Yes  Occupational Therapy Consult: No  Speech Therapy Consult: No  Current Support Network: Own Home, Lives with Spouse  Confirm Follow Up Transport: Family  The Plan for Transition of Care is Related to the Following Treatment Goals : home with home health   The Patient and/or Patient Representative was Provided with a Choice of Provider and Agrees with the Discharge Plan?: Yes  Name of the Patient Representative Who was Provided with a Choice of Provider and Agrees with the Discharge Plan: Mr. Toya Cowden of Choice List was Provided with Basic Dialogue that Supports the Patient's Individualized Plan of Care/Goals, Treatment Preferences and Shares the Quality Data Associated with the Providers?: Yes  [facility] Resource Information Provided?: No  Discharge Location  Discharge Placement: Home with home health

## 2020-08-19 ENCOUNTER — PATIENT OUTREACH (OUTPATIENT)
Dept: CASE MANAGEMENT | Age: 80
End: 2020-08-19

## 2020-08-19 NOTE — PROGRESS NOTES
Transition of Care Hospital Discharge Follow-Up      Date/Time:  2020 11:08 AM    Patient was admitted to [facility] on 8/10/2020 and discharged on 2020 for S/P CABG x 2 The physician discharge summary was available at the time of outreach. Patient was contacted within 7 business days of discharge. Inpatient RUR score: 4  Was this a readmission? no   Patient stated reason for the readmission: none  Patients top risk factors for readmission: S/P CBAG x 2      LPN Care Coordinator contacted the patient by telephone to perform post hospital discharge assessment. Verified name and  with patient has identifiers. Provided introduction to self, and explanation of the Care Coordinator role. Patient received hospital discharge instructions. LPN reviewed discharge instructions and red flags with patient who verbalized understanding. Patient given an opportunity to ask questions and does not have any further questions or concerns at this time. The patient agrees to contact the PCP office for questions related to their healthcare. LPN provided contact information for future reference. Home Health orders at discharge: [address]:   Date of initial or scheduled visit:   (Assist with coordination of services if necessary.)    Durable Medical Equipment ordered at discharge: none  [address]:   [address] [facility] received:   (Assist patient in obtaining DME orders &/or equipment if necessary.)    Medication(s):   Medication review was performed with patient, who verbalizes understanding of administration of home medications. There were no barriers to obtaining medications identified at this time. Current Outpatient Medications   Medication Sig    acetaminophen (TYLENOL) 325 mg tablet Take 2 Tabs by mouth every six (6) hours as needed for Pain.     amiodarone (CORDARONE) 200 mg tablet Take 2 Tabs by mouth every twelve (12) hours for 4 days, THEN 1 Tab every twelve (12) hours for 7 days.  atorvastatin (LIPITOR) 40 mg tablet Take 1 Tab by mouth nightly.  apixaban (ELIQUIS) 5 mg tablet Take 1 Tab by mouth every twelve (12) hours.  nitroglycerin (NITROSTAT) 0.4 mg SL tablet 1 Tab by SubLINGual route every five (5) minutes as needed for Chest Pain. Up to 3 doses.  aspirin 81 mg chewable tablet Take 81 mg by mouth daily. Take morning of procedure.  solifenacin (VESICARE) 5 mg tablet Take 1 Tab by mouth daily. (Patient taking differently: Take 5 mg by mouth daily. Take morning of procedure.)    cyanocobalamin (VITAMIN B12) 1,000 mcg/mL injection Use once weekly for 4 weeks IM, then monthly    cholecalciferol (Vitamin D3) 25 mcg (1,000 unit) cap Take  by mouth daily.  betamethasone dipropionate (DIPROLENE) 0.05 % ointment Apply  to affected area two (2) times a day. Pull back foreskin with each application. No current facility-administered medications for this visit. There are no discontinued medications. ADL assessment:   (If patient is unable to perform ADLs  what is the limiting factor(s)? Do they have a support system that can assist? If no support system is present, discuss possible assistance that they may be able to obtain.  Escalate for SW if ongoing issues are verbalized by pt or anticipated)    [facility] follow up appointment(s):   Future Appointments   Date Time Provider Ravi Brandt   8/25/2020  1:30 PM Ita Rose DO [facility] UCDE UCD   8/31/2020 11:30 AM Azucena Jamison MD BSNE BSNE   9/9/2020  3:00 PM Tess Grande MD [facility] AT [facility]   10/23/2020 11:15 AM Robinosn Oswald MD [facility] FIM FIM   6/15/2021 11:00 AM Beryle Chamber, FNP [id number] PGU   7/29/2021  9:15 AM FIM WELLNESS [facility] FIM FIM   7/29/2021  9:30 AM Robinson Oswald MD [facility] FIM FIM      Non-[facility] follow up appointment(s):   7 Day follow up with PCP or Specialist:Dr. Rowena Giullen 8/25/2020   Transportation arranged: none    Covid Risk Education    Patient has following risk factors of: heart failure. Education provided regarding infection prevention, and signs and symptoms of COVID-19 and when to seek medical attention with patient who verbalized understanding. Discussed exposure protocols and quarantine From CDC: Are you at higher risk for severe illness?  and given an opportunity for questions and concerns. The patient agrees to contact the COVID-19 hotline [phone] or PCP office for questions related to COVID-19. For more information on steps you can take to protect yourself, see CDC's How to Protect Yourself     Patient/family/caregiver given information for GetWell Loop and agrees to enroll no  Patient's preferred e-mail: declines  Patient's preferred phone number: declines      Any other questions or concerns expressed by patient? Patient voiced no concerns at this time. Scheduled next follow up call or referral to CTN/ ACM:  Next follow up call:within 14 days    Goals Addressed                 This Visit's Progress     Improve activity tolerance and quality of life.         Knowledge and adherence of medication (ie. action, side effects, missed dose, etc.)

## 2020-08-20 ENCOUNTER — HOSPITAL ENCOUNTER (OUTPATIENT)
Dept: LAB | Age: 80
Discharge: HOME | End: 2020-08-20
Payer: MEDICARE

## 2020-08-20 DIAGNOSIS — D69.6 THROMBOCYTOPENIA (HCC): ICD-10-CM

## 2020-08-20 DIAGNOSIS — I48.91 ATRIAL FIBRILLATION, UNSPECIFIED TYPE (HCC): ICD-10-CM

## 2020-08-20 DIAGNOSIS — Z95.1 S/P CABG X 2: ICD-10-CM

## 2020-08-20 LAB
BASOPHILS # BLD: 0.1 K/UL (ref 0–0.2)
BASOPHILS NFR BLD: 1 % (ref 0–2)
DIFFERENTIAL METHOD BLD: ABNORMAL
EOSINOPHIL # BLD: 0.5 K/UL (ref 0–0.8)
EOSINOPHIL NFR BLD: 5 % (ref 0.5–7.8)
ERYTHROCYTE [DISTWIDTH] IN BLOOD BY AUTOMATED COUNT: 14.1 % (ref 11.9–14.6)
HCT VFR BLD AUTO: 30.2 % (ref 41.1–50.3)
HGB BLD-MCNC: 9.8 G/DL (ref 13.6–17.2)
IMM GRANULOCYTES # BLD AUTO: 0.1 K/UL (ref 0–0.5)
IMM GRANULOCYTES NFR BLD AUTO: 1 % (ref 0–5)
LYMPHOCYTES # BLD: 0.8 K/UL (ref 0.5–4.6)
LYMPHOCYTES NFR BLD: 8 % (ref 13–44)
MCH RBC QN AUTO: 30.2 PG (ref 26.1–32.9)
MCHC RBC AUTO-ENTMCNC: 32.5 G/DL (ref 31.4–35)
MCV RBC AUTO: 93.2 FL (ref 79.6–97.8)
MONOCYTES # BLD: 0.7 K/UL (ref 0.1–1.3)
MONOCYTES NFR BLD: 7 % (ref 4–12)
NEUTS SEG # BLD: 7.5 K/UL (ref 1.7–8.2)
NEUTS SEG NFR BLD: 78 % (ref 43–78)
NRBC # BLD: 0 K/UL (ref 0–0.2)
PLATELET # BLD AUTO: 247 K/UL (ref 150–450)
PMV BLD AUTO: 10.5 FL (ref 9.4–12.3)
RBC # BLD AUTO: 3.24 M/UL (ref 4.23–5.6)
WBC # BLD AUTO: 9.6 K/UL (ref 4.3–11.1)

## 2020-08-20 PROCEDURE — 85025 COMPLETE CBC W/AUTO DIFF WBC: CPT

## 2020-08-20 PROCEDURE — 36415 COLL VENOUS BLD VENIPUNCTURE: CPT

## 2020-08-23 NOTE — ANESTHESIA POSTPROCEDURE EVALUATION
Procedure(s):  CORONARY ARTERY BYPASS GRAFT (CABG X2), LIMA  VEIN HARVEST, GREATER SAPHENOUS  ESOPHAGEAL TRANS ECHOCARDIOGRAM.    general    Anesthesia Post Evaluation      Multimodal analgesia: multimodal analgesia used between 6 hours prior to anesthesia start to PACU discharge  Patient location during evaluation: ICU  Patient participation: complete - patient cannot participate  Post-procedure mental status: asleep on vent. Pain management: adequate  Airway patency: intubated. Anesthetic complications: no  Cardiovascular status: hemodynamically stable  Respiratory status: intubated and ventilator  Hydration status: euvolemic  Post anesthesia nausea and vomiting:  none  Final Post Anesthesia Temperature Assessment:  Normothermia (36.0-37.5 degrees C)    Patient in cvru asleep on vent s/p cabg in stable but critical condition. INITIAL Post-op Vital signs: No vitals data found for the desired time range.

## 2020-08-25 PROBLEM — I25.110 ATHEROSCLEROSIS OF NATIVE CORONARY ARTERY OF NATIVE HEART WITH UNSTABLE ANGINA PECTORIS (HCC): Chronic | Status: RESOLVED | Noted: 2020-08-10 | Resolved: 2020-08-25

## 2020-09-01 PROBLEM — R35.0 FREQUENCY OF URINATION: Status: ACTIVE | Noted: 2020-09-01

## 2020-09-01 PROBLEM — N30.40 RADIATION CYSTITIS: Status: ACTIVE | Noted: 2020-09-01

## 2020-09-01 PROBLEM — R31.29 MICROSCOPIC HEMATURIA: Status: ACTIVE | Noted: 2020-09-01

## 2020-09-01 PROBLEM — R39.14 FEELING OF INCOMPLETE BLADDER EMPTYING: Status: ACTIVE | Noted: 2020-09-01

## 2020-09-01 PROBLEM — C61 PROSTATE CANCER (HCC): Status: ACTIVE | Noted: 2020-09-01

## 2020-09-02 ENCOUNTER — PATIENT OUTREACH (OUTPATIENT)
Dept: CASE MANAGEMENT | Age: 80
End: 2020-09-02

## 2020-09-02 NOTE — PROGRESS NOTES
Transition of Care Hospital Discharge Follow-Up      Date/Time:  2020 10:43 AM    LPN Care Coordinator contacted the family by telephone to perform post hospital follow up. Verified name and  with family as identifiers. LPN reinforced discharge instructions and red flags with family who verbalized understanding. Family given an opportunity to ask questions and does not have any further questions or concerns at this time. The family agrees to contact the PCP office for questions related to their healthcare    [facility] follow up appointment(s):   Future Appointments   Date Time Provider Ravi Brandt   2020  3:00 PM Raul Blanchard MD [facility] AT [facility]   2020 11:15 AM Joseryan Reed FNP [id number] PGU   10/1/2020 12:45 PM Marc Black DO [facility] UCDE UCD   10/23/2020 11:15 AM MD CHILO Randall FIM FIM   2020  2:00 PM Marifer Goodman MD [facility] BSNE   6/15/2021 11:00 AM Joseryan Reed FNP [id number] PGU   2021  9:15 AM FIM Ofelia Kanner [facility] FIM FIM   2021  9:30 AM Yaquelin Meyer MD [facility] FIM FIM      Non-[facility] follow up appointment(s):   Patient attended follow up appointments since last contact: Dr. Julio Cesar Osborne 2020  What was the outcome of the appointment:     [address]: [address] [place] [address] Street: Interim  Any issues/ progress:  (Assist with coordination of services if necessary.)      Medication(s):   Medication changes since discharge:     Current Outpatient Medications   Medication Sig    oxybutynin chloride XL (Ditropan XL) 15 mg CR tablet Take 1 Tab by mouth nightly. Indications: urinary urgency, or the sudden urge to urinate    nitrofurantoin, macrocrystal-monohydrate, (Macrobid) 100 mg capsule Take 1 Cap by mouth nightly.  carbidopa-levodopa (Sinemet)  mg per tablet Take 1 Tab by mouth three (3) times daily.  methen-sod phos-meth blue-hyos (Urogesic-Blue) 81.6-40.8-0.12 mg tab Take 1 Tab by mouth four (4) times daily as needed for Pain.     acetaminophen (TYLENOL) 325 mg tablet Take 2 Tabs by mouth every six (6) hours as needed for Pain.  apixaban (ELIQUIS) 5 mg tablet Take 1 Tab by mouth every twelve (12) hours.  nitroglycerin (NITROSTAT) 0.4 mg SL tablet 1 Tab by SubLINGual route every five (5) minutes as needed for Chest Pain. Up to 3 doses.  aspirin 81 mg chewable tablet Take 81 mg by mouth daily. Take morning of procedure.  cyanocobalamin (VITAMIN B12) 1,000 mcg/mL injection Use once weekly for 4 weeks IM, then monthly    cholecalciferol (Vitamin D3) 25 mcg (1,000 unit) cap Take  by mouth daily.  betamethasone dipropionate (DIPROLENE) 0.05 % ointment Apply  to affected area two (2) times a day. Pull back foreskin with each application. No current facility-administered medications for this visit. Other Issues/ Miscellaneous? (Transportation, access to meals, ability to perform ADLs, adequate caregiver support, etc.)  Referrals needed? (, Diabetes education, HH, etc.)    Any other questions or concerns expressed by patient? Patient's wife voiced no concerns at this time    Schedule next appointment with ANALY or referral to CTN/ ACM:  Graduation:Yes  Next Outreach Scheduled:     Goals      Improve activity tolerance and quality of life.       Knowledge and adherence of medication (ie. action, side effects, missed dose, etc.)

## 2020-09-03 PROBLEM — G20 PARKINSON DISEASE (HCC): Status: ACTIVE | Noted: 2020-09-03

## 2020-09-03 PROBLEM — G47.52 REM BEHAVIORAL DISORDER: Status: ACTIVE | Noted: 2020-09-03

## 2020-12-10 ENCOUNTER — HOSPITAL ENCOUNTER (OUTPATIENT)
Dept: MRI IMAGING | Age: 80
Discharge: HOME | End: 2020-12-10
Attending: PSYCHIATRY & NEUROLOGY
Payer: MEDICARE

## 2020-12-10 DIAGNOSIS — G20 PD (PARKINSON'S DISEASE) (HCC): ICD-10-CM

## 2020-12-10 DIAGNOSIS — R26.89 ABNORMALITY OF GAIT DUE TO IMPAIRMENT OF BALANCE: ICD-10-CM

## 2020-12-10 PROCEDURE — 70551 MRI BRAIN STEM W/O DYE: CPT

## 2020-12-12 ENCOUNTER — HOSPITAL ENCOUNTER (EMERGENCY)
Age: 80
Discharge: HOME | End: 2020-12-12
Attending: EMERGENCY MEDICINE
Payer: MEDICARE

## 2020-12-12 ENCOUNTER — APPOINTMENT (OUTPATIENT)
Dept: GENERAL RADIOLOGY | Age: 80
End: 2020-12-12
Attending: EMERGENCY MEDICINE
Payer: MEDICARE

## 2020-12-12 VITALS
WEIGHT: 190 LBS | DIASTOLIC BLOOD PRESSURE: 67 MMHG | RESPIRATION RATE: 19 BRPM | OXYGEN SATURATION: 97 % | HEART RATE: 93 BPM | SYSTOLIC BLOOD PRESSURE: 131 MMHG | BODY MASS INDEX: 23.14 KG/M2 | HEIGHT: 76 IN | TEMPERATURE: 97.8 F

## 2020-12-12 DIAGNOSIS — L03.116 CELLULITIS OF LEFT LOWER EXTREMITY: Primary | ICD-10-CM

## 2020-12-12 LAB
ALBUMIN SERPL-MCNC: 3.4 G/DL (ref 3.2–4.6)
ALBUMIN/GLOB SERPL: 0.7 {RATIO} (ref 1.2–3.5)
ALP SERPL-CCNC: 70 U/L (ref 50–136)
ALT SERPL-CCNC: 9 U/L (ref 12–65)
ANION GAP SERPL CALC-SCNC: 4 MMOL/L (ref 7–16)
AST SERPL-CCNC: 7 U/L (ref 15–37)
BASOPHILS # BLD: 0.1 K/UL (ref 0–0.2)
BASOPHILS NFR BLD: 1 % (ref 0–2)
BILIRUB SERPL-MCNC: 0.4 MG/DL (ref 0.2–1.1)
BUN SERPL-MCNC: 38 MG/DL (ref 8–23)
CALCIUM SERPL-MCNC: 8.8 MG/DL (ref 8.3–10.4)
CHLORIDE SERPL-SCNC: 106 MMOL/L (ref 98–107)
CO2 SERPL-SCNC: 29 MMOL/L (ref 21–32)
CREAT SERPL-MCNC: 1.64 MG/DL (ref 0.8–1.5)
DIFFERENTIAL METHOD BLD: ABNORMAL
EOSINOPHIL # BLD: 0.2 K/UL (ref 0–0.8)
EOSINOPHIL NFR BLD: 2 % (ref 0.5–7.8)
ERYTHROCYTE [DISTWIDTH] IN BLOOD BY AUTOMATED COUNT: 17.2 % (ref 11.9–14.6)
GLOBULIN SER CALC-MCNC: 4.8 G/DL (ref 2.3–3.5)
GLUCOSE SERPL-MCNC: 103 MG/DL (ref 65–100)
HCT VFR BLD AUTO: 35.3 % (ref 41.1–50.3)
HGB BLD-MCNC: 10.9 G/DL (ref 13.6–17.2)
IMM GRANULOCYTES # BLD AUTO: 0 K/UL (ref 0–0.5)
IMM GRANULOCYTES NFR BLD AUTO: 0 % (ref 0–5)
LACTATE SERPL-SCNC: 1.3 MMOL/L (ref 0.4–2)
LYMPHOCYTES # BLD: 1.3 K/UL (ref 0.5–4.6)
LYMPHOCYTES NFR BLD: 14 % (ref 13–44)
MCH RBC QN AUTO: 28.6 PG (ref 26.1–32.9)
MCHC RBC AUTO-ENTMCNC: 30.9 G/DL (ref 31.4–35)
MCV RBC AUTO: 92.7 FL (ref 79.6–97.8)
MONOCYTES # BLD: 0.8 K/UL (ref 0.1–1.3)
MONOCYTES NFR BLD: 9 % (ref 4–12)
NEUTS SEG # BLD: 7.1 K/UL (ref 1.7–8.2)
NEUTS SEG NFR BLD: 75 % (ref 43–78)
NRBC # BLD: 0 K/UL (ref 0–0.2)
PLATELET # BLD AUTO: 153 K/UL (ref 150–450)
PMV BLD AUTO: 11.7 FL (ref 9.4–12.3)
POTASSIUM SERPL-SCNC: 4.7 MMOL/L (ref 3.5–5.1)
PROT SERPL-MCNC: 8.2 G/DL (ref 6.3–8.2)
RBC # BLD AUTO: 3.81 M/UL (ref 4.23–5.6)
SODIUM SERPL-SCNC: 139 MMOL/L (ref 138–145)
WBC # BLD AUTO: 9.5 K/UL (ref 4.3–11.1)

## 2020-12-12 PROCEDURE — 83605 ASSAY OF LACTIC ACID: CPT

## 2020-12-12 PROCEDURE — 99283 EMERGENCY DEPT VISIT LOW MDM: CPT

## 2020-12-12 PROCEDURE — 74011250636 HC RX REV CODE- 250/636: Performed by: EMERGENCY MEDICINE

## 2020-12-12 PROCEDURE — 80053 COMPREHEN METABOLIC PANEL: CPT

## 2020-12-12 PROCEDURE — 87040 BLOOD CULTURE FOR BACTERIA: CPT

## 2020-12-12 PROCEDURE — 85025 COMPLETE CBC W/AUTO DIFF WBC: CPT

## 2020-12-12 PROCEDURE — 96374 THER/PROPH/DIAG INJ IV PUSH: CPT

## 2020-12-12 PROCEDURE — 73660 X-RAY EXAM OF TOE(S): CPT

## 2020-12-12 RX ORDER — CLINDAMYCIN PHOSPHATE 600 MG/50ML
600 INJECTION INTRAVENOUS
Status: COMPLETED | OUTPATIENT
Start: 2020-12-12 | End: 2020-12-12

## 2020-12-12 RX ORDER — CLINDAMYCIN HYDROCHLORIDE 150 MG/1
300 CAPSULE ORAL 3 TIMES DAILY
Qty: 60 CAP | Refills: 0 | Status: SHIPPED | OUTPATIENT
Start: 2020-12-12 | End: 2020-12-17 | Stop reason: DRUGHIGH

## 2020-12-12 RX ADMIN — CLINDAMYCIN PHOSPHATE 600 MG: 600 INJECTION, SOLUTION INTRAVENOUS at 18:14

## 2020-12-12 NOTE — ED TRIAGE NOTES
Pt states about 4 months ago had veins grafted for CABG and states yesterday he noticed the incision site red, warm- not draining. Denies fever, cough, SOB. Pt has area on left great toe also open and draining. Denies hx of DM.  Pt is on eliquis

## 2020-12-12 NOTE — DISCHARGE INSTRUCTIONS
Complete the full course of antibiotics. Use warm compresses on the left medial thigh several times per day. Monitor the toe and the area on the middle of your leg. If any of them continue to worsen in spite of antibiotics return for further evaluation.   Also if your develop other symptoms like shaking chills or fevers return for further evaluation

## 2020-12-12 NOTE — ED PROVIDER NOTES
Pleasant 58-year-old male presenting for evaluation near the left medial knee and a 4 worsening redness and pain around the left great toe. All the symptoms started about 4 months ago after a vascular procedure. The toe is come and went. He recently had antibiotics for a skin infection on his leg and while he was on those antibiotics the toe got better. Over the past few days the pain in the toe has returned. Is become increasingly red. There is no real drainage from the toe at this time. He also has an area on the left medial thigh near the postsurgical scar that has become inflamed and tender. There is no drainage. The history is provided by the patient. Abscess    This is a recurrent problem. The current episode started 2 days ago. The problem has been gradually worsening. There has been no fever. The rash is present on the left upper leg. The pain is at a severity of 3/10. The pain is moderate. The pain has been constant since onset. Associated symptoms include pain. Pertinent negatives include no blisters, no itching, no weeping and no hives. He has tried nothing for the symptoms. The treatment provided no relief.         Past Medical History:   Diagnosis Date    Arthritis     OA generalized    Buerger's disease ([facility] [address]     left leg blood clot ~1995    CAD (coronary artery disease)     Decreased mobility     Gout     Hematuria, microscopic     Hiatal hernia     Personal history of prostate cancer     Prostate cancer ([facility] [address] ~2000    radiation treatment    Sleep apnea        Past Surgical History:   Procedure Laterality Date    APPENDECTOMY      HX CATARACT REMOVAL Bilateral     IOL    HX COLONOSCOPY      poplyp removed    HX CYST REMOVAL Left     shoulder    HX HEART CATHETERIZATION  08/03/2020    HX OTHER SURGICAL      prostate radiation    HX TURP           Family History:   Problem Relation Age of Onset    Cancer Father     Stroke Father     Heart Disease Father     Stroke Mother  Heart Disease Mother     Pulmonary Embolism Brother        Social History     Socioeconomic History    Marital status:      Spouse name: Not on file    Number of children: Not on file    Years of education: Not on file    Highest education level: Not on file   Occupational History    Not on file   Social Needs    Financial resource strain: Not on file    Food insecurity     Worry: Not on file     Inability: Not on file    Transportation needs     Medical: Not on file     Non-medical: Not on file   Tobacco Use    Smoking status: Former Smoker     Last attempt to quit: 1960     Years since quittin.9    Smokeless tobacco: Never Used   Substance and Sexual Activity    Alcohol use: No    Drug use: Never    Sexual activity: Yes   Lifestyle    Physical activity     Days per week: Not on file     Minutes per session: Not on file    Stress: Not on file   Relationships    Social connections     Talks on phone: Not on file     Gets together: Not on file     Attends [employer] service: Not on file     Active member of club or organization: Not on file     Attends meetings of clubs or organizations: Not on file     Relationship status: Not on file    Intimate partner violence     Fear of current or ex partner: Not on file     Emotionally abused: Not on file     Physically abused: Not on file     Forced sexual activity: Not on file   Other Topics Concern    Not on file   Social History Narrative    Drinks 1 cup of caffeine per day         ALLERGIES: Adhesive and Tamsulosin    Review of Systems   Musculoskeletal: Positive for arthralgias. Skin: Positive for wound. Negative for itching. All other systems reviewed and are negative. Vitals:    20 1605   BP: (!) 146/75   Pulse: (!) 107   Resp: 16   Temp: 97.8 °F (36.6 °C)   SpO2: 98%   Weight: 86.2 kg (190 lb)   Height: 6' 4\" (1.93 m)            Physical Exam  Vitals signs and nursing note reviewed.    Constitutional:       Appearance: He is well-developed. HENT:      Head: Normocephalic and atraumatic. Eyes:      Conjunctiva/sclera: Conjunctivae normal.      Pupils: Pupils are equal, round, and reactive to light. Neck:      Musculoskeletal: Normal range of motion and neck supple. Cardiovascular:      Rate and Rhythm: Normal rate and regular rhythm. Pulses: Normal pulses. Heart sounds: Normal heart sounds. Pulmonary:      Effort: Pulmonary effort is normal.      Breath sounds: Normal breath sounds. Abdominal:      General: Bowel sounds are normal.      Palpations: Abdomen is soft. Musculoskeletal: Normal range of motion. General: Swelling and tenderness present. No deformity. Comments: Left great toe is erythematous with some skin breakdown on the distal tip but no exposed bone drainage or exposed underlying subcutaneous fat. There is also a second area of erythema swollen near the postsurgical scar on the medial thigh above the knee that is tender to palpation but nonfluctuant there is some induration   Skin:     General: Skin is warm and dry. Neurological:      Mental Status: He is alert and oriented to person, place, and time. Cranial Nerves: No cranial nerve deficit. Psychiatric:         Behavior: Behavior normal.          MDM  Number of Diagnoses or Management Options  Cellulitis of left lower extremity:   Diagnosis management comments: 25-year-old male presenting for evaluation of a swollen erythematous indurated area in the left medial thigh. Palpation does not really elicit anything that seems like an abscess. We will start the patient on clindamycin and have him do warm compresses on the area. The toe itself also looks somewhat infected. There is some skin breakdown on the distal tip but there is no subcutaneous exposure and the x-ray does not show an overt sign of osteomyelitis.   It shows maybe an underlying old fracture but he does have a history of neuropathy and this seems to be old.  Reviewed the patient's blood work and that is unremarkable. His vital signs are reassuring. I think that we can start the patient on clindamycin and discharging home with something similar. Amount and/or Complexity of Data Reviewed  Clinical lab tests: ordered and reviewed (Results for orders placed or performed during the hospital encounter of 12/12/20  -CBC WITH AUTOMATED DIFF       Result                      Value             Ref Range           WBC                         9.5               4.3 - 11.1 K*       RBC                         3.81 (L)          4.23 - 5.6 M*       HGB                         10.9 (L)          13.6 - 17.2 *       HCT                         35.3 (L)          41.1 - 50.3 %       MCV                         92.7              79.6 - 97.8 *       MCH                         28.6              26.1 - 32.9 *       MCHC                        30.9 (L)          31.4 - 35.0 *       RDW                         17.2 (H)          11.9 - 14.6 %       PLATELET                    153               150 - 450 K/*       MPV                         11.7              9.4 - 12.3 FL       ABSOLUTE NRBC               0.00              0.0 - 0.2 K/*       DF                          AUTOMATED                             NEUTROPHILS                 75                43 - 78 %           LYMPHOCYTES                 14                13 - 44 %           MONOCYTES                   9                 4.0 - 12.0 %        EOSINOPHILS                 2                 0.5 - 7.8 %         BASOPHILS                   1                 0.0 - 2.0 %         IMMATURE GRANULOCYTES       0                 0.0 - 5.0 %         ABS. NEUTROPHILS            7.1               1.7 - 8.2 K/*       ABS. LYMPHOCYTES            1.3               0.5 - 4.6 K/*       ABS. MONOCYTES              0.8               0.1 - 1.3 K/*       ABS. EOSINOPHILS            0.2               0.0 - 0.8 K/*       ABS.  BASOPHILS 0.1               0.0 - 0.2 K/*       ABS. IMM. GRANS.            0.0               0.0 - 0.5 K/*  -METABOLIC PANEL, COMPREHENSIVE       Result                      Value             Ref Range           Sodium                      139               138 - 145 mm*       Potassium                   4.7               3.5 - 5.1 mm*       Chloride                    106               98 - 107 mmo*       CO2                         29                21 - 32 mmol*       Anion gap                   4 (L)             7 - 16 mmol/L       Glucose                     103 (H)           65 - 100 mg/*       BUN                         38 (H)            8 - 23 MG/DL        Creatinine                  1.64 (H)          0.8 - 1.5 MG*       GFR est AA                  52 (L)            >60 ml/min/1*       GFR est non-AA              43 (L)            >60 ml/min/1*       Calcium                     8.8               8.3 - 10.4 M*       Bilirubin, total            0.4               0.2 - 1.1 MG*       ALT (SGPT)                  9 (L)             12 - 65 U/L         AST (SGOT)                  7 (L)             15 - 37 U/L         Alk. phosphatase            70                50 - 136 U/L        Protein, total              8.2               6.3 - 8.2 g/*       Albumin                     3.4               3.2 - 4.6 g/*       Globulin                    4.8 (H)           2.3 - 3.5 g/*       A-G Ratio                   0.7 (L)           1.2 - 3.5      -LACTIC ACID       Result                      Value             Ref Range           Lactic acid                 1.3               0.4 - 2.0 MM*  )  Tests in the radiology section of CPT®: ordered and reviewed (Xr Great Toe Lt Min 2 V    Result Date: 12/12/2020  LEFT TOE 3 VIEWS HISTORY: Open wound on left great toe. FINDINGS: Soft tissue swelling is present at the tip of the toe. A defect in the distal end of the toe is suggestive of an amputation or fracture.      IMPRESSION: Possible amputation defect of the tip of the toe. Mri Brain Wo Cont    Result Date: 12/10/2020  MRI BRAIN WITHOUT CONTRAST 12/10/2020 HISTORY: 70-year-old with Parkinson's disease. Difficulty walking. Tremor. Abnormal gait. TECHNIQUE: Sagittal and axial T1-weighted, axial T2-weighted, axial and coronal FLAIR, axial T2-weighted gradient-echo, axial diffusion weighted images with ADC maps of the brain. COMPARISON: None FINDINGS: There is no acute infarction, intracranial hemorrhage, intra-axial mass, hydrocephalus, or extra-axial myxoma. Moderate diffuse cerebral volume loss is present without disproportionate hippocampal atrophy. On the T2-weighted and FLAIR sequences, there are a few white matter hyperintensities. This pattern may be present with chronic small vessel ischemic disease. There is no disproportionate cerebellar atrophy. IMPRESSION: Moderate cerebral volume loss and white matter findings compatible vessel ischemic disease.    )  Independent visualization of images, tracings, or specimens: yes    Risk of Complications, Morbidity, and/or Mortality  Presenting problems: high  Diagnostic procedures: high  Management options: moderate  General comments: Patient is remained hemodynamically stable. X-ray shows nothing acute. Blood work is unremarkable. The area on the medial thigh appears to be more of a cellulitis as opposed to an abscess. Treating with clindamycin and instructions for warm compresses to the area. Clear return precautions should he develop fever shaking chills or have no improvement in 48 to 72 hours    I personally reviewed the patient's vital signs, laboratory tests, and/or radiological findings. I discussed these findings with the patient and their significance. I answered all questions and gave the patient clear return precautions.   The patient was discharged from the emergency department in stable condition        Patient Progress  Patient progress: improved Procedures

## 2020-12-17 LAB
BACTERIA SPEC CULT: NORMAL
BACTERIA SPEC CULT: NORMAL
SERVICE CMNT-IMP: NORMAL
SERVICE CMNT-IMP: NORMAL

## 2021-02-04 PROBLEM — R20.2 PARESTHESIA: Status: ACTIVE | Noted: 2021-02-04

## 2022-03-18 PROBLEM — D69.6 THROMBOCYTOPENIA (HCC): Status: ACTIVE | Noted: 2020-08-12

## 2022-03-18 PROBLEM — N28.1 RENAL CYST: Status: ACTIVE | Noted: 2018-04-13

## 2022-03-19 PROBLEM — G20.A1 PARKINSON DISEASE: Status: ACTIVE | Noted: 2020-09-03

## 2022-03-19 PROBLEM — Z95.1 S/P CABG X 2: Status: ACTIVE | Noted: 2020-08-10

## 2022-03-19 PROBLEM — R35.0 FREQUENCY OF URINATION: Status: ACTIVE | Noted: 2020-09-01

## 2022-03-19 PROBLEM — D62 ACUTE BLOOD LOSS ANEMIA: Status: ACTIVE | Noted: 2020-08-13

## 2022-03-19 PROBLEM — N30.40 RADIATION CYSTITIS: Status: ACTIVE | Noted: 2020-09-01

## 2022-03-19 PROBLEM — G47.31 CENTRAL SLEEP APNEA: Status: ACTIVE | Noted: 2020-08-11

## 2022-03-19 PROBLEM — G47.52 REM BEHAVIORAL DISORDER: Status: ACTIVE | Noted: 2020-09-03

## 2022-03-19 PROBLEM — R94.39 ABNORMAL CARDIOVASCULAR STRESS TEST: Status: ACTIVE | Noted: 2020-07-31

## 2022-03-19 PROBLEM — R07.2 PRECORDIAL PAIN: Status: ACTIVE | Noted: 2020-07-09

## 2022-03-19 PROBLEM — G20 PARKINSON DISEASE (HCC): Status: ACTIVE | Noted: 2020-09-03

## 2022-03-19 PROBLEM — R31.29 MICROSCOPIC HEMATURIA: Status: ACTIVE | Noted: 2020-09-01

## 2022-03-19 PROBLEM — R20.2 PARESTHESIA: Status: ACTIVE | Noted: 2021-02-04

## 2022-03-19 PROBLEM — R39.14 FEELING OF INCOMPLETE BLADDER EMPTYING: Status: ACTIVE | Noted: 2020-09-01

## 2022-03-19 PROBLEM — G47.33 OBSTRUCTIVE SLEEP APNEA SYNDROME: Status: ACTIVE | Noted: 2020-05-12

## 2022-03-19 PROBLEM — I25.10 CAD (CORONARY ARTERY DISEASE): Status: ACTIVE | Noted: 2020-08-10

## 2022-03-20 PROBLEM — C61 PROSTATE CANCER (HCC): Status: ACTIVE | Noted: 2020-09-01

## 2022-03-20 PROBLEM — R94.31 EKG, ABNORMAL: Status: ACTIVE | Noted: 2020-07-09

## 2022-03-20 PROBLEM — I48.91 ATRIAL FIBRILLATION (HCC): Status: ACTIVE | Noted: 2020-08-14

## 2022-07-11 ENCOUNTER — OFFICE VISIT (OUTPATIENT)
Dept: CARDIOLOGY CLINIC | Age: 82
End: 2022-07-11
Payer: MEDICARE

## 2022-07-11 VITALS
WEIGHT: 187 LBS | DIASTOLIC BLOOD PRESSURE: 64 MMHG | SYSTOLIC BLOOD PRESSURE: 130 MMHG | HEIGHT: 76 IN | RESPIRATION RATE: 18 BRPM | HEART RATE: 72 BPM | BODY MASS INDEX: 22.77 KG/M2

## 2022-07-11 DIAGNOSIS — I25.10 CORONARY ARTERY DISEASE INVOLVING NATIVE CORONARY ARTERY OF NATIVE HEART WITHOUT ANGINA PECTORIS: Primary | ICD-10-CM

## 2022-07-11 DIAGNOSIS — G20 PARKINSON DISEASE (HCC): ICD-10-CM

## 2022-07-11 DIAGNOSIS — I48.0 PAROXYSMAL ATRIAL FIBRILLATION (HCC): ICD-10-CM

## 2022-07-11 DIAGNOSIS — Z95.1 S/P CABG X 2: ICD-10-CM

## 2022-07-11 DIAGNOSIS — N18.31 STAGE 3A CHRONIC KIDNEY DISEASE (HCC): ICD-10-CM

## 2022-07-11 PROBLEM — N18.30 CHRONIC RENAL DISEASE, STAGE III (HCC): Status: ACTIVE | Noted: 2022-07-11

## 2022-07-11 PROCEDURE — 99214 OFFICE O/P EST MOD 30 MIN: CPT | Performed by: INTERNAL MEDICINE

## 2022-07-11 PROCEDURE — 1123F ACP DISCUSS/DSCN MKR DOCD: CPT | Performed by: INTERNAL MEDICINE

## 2022-07-11 NOTE — PROGRESS NOTES
[address], [address] [facility], [address] [place] [address]  PHONE: [phone]     22    NAME:  Mitzi Manriquez  : 1940  MRN: [id number]       SUBJECTIVE:   Mitzi Manriquez is a 79 y.o. male seen for a follow up visit regarding the following:     Chief Complaint   Patient presents with    Atrial Fibrillation     6 month Follow up       HPI:    Here for CAD. AILEEN on BiPAP. 8/10/2020: CABG X 2 with LIMA to LAD and reverse SVG to the OM. Post-op Afib and low BPs. Echo 2021: normal EF. Dx with Parkinson's. Ongoing weakness, lost some weight. Walking ok. No angina, no LE edema. Some palp. No new villegas, sob. Patient denies recent history of orthopnea, PND, excessive dizziness and/or syncope. Here with daughter today. Past Medical History, Past Surgical History, Family history, Social History, and Medications were all reviewed with the patient today and updated as necessary. Current Outpatient Medications   Medication Sig Dispense Refill    Multiple Vitamin (MULTIVITAMIN ADULT PO) Take 1 tablet by mouth daily      aspirin 81 MG EC tablet Take 81 mg by mouth daily      carbidopa-levodopa (SINEMET)  MG per tablet Take 2 tablets by mouth 3 times daily      cyanocobalamin 1000 MCG/ML injection INJECT 1ML INTRAMUSCULARLY ONCE WEEKLY FOR 4 WEEKS THEN START MONTHLY      Melatonin 5 MG CAPS Take by mouth      nystatin (MYCOSTATIN) 353040 UNIT/GM ointment Apply topically 2 times daily (Patient not taking: Reported on 2022)       No current facility-administered medications for this visit.         Allergies   Allergen Reactions    Tamsulosin Other (See Comments)    Adhesive Tape Rash     Patient Active Problem List    Diagnosis Date Noted    Chronic renal disease, stage III [facility]) [041097] 2022     Priority: Medium    Paresthesia 2021    Parkinson disease ([address] 2020    REM behavioral disorder 2020    Feeling of incomplete bladder emptying 2020    Frequency of urination 2020    Radiation cystitis 2020    Microscopic hematuria 2020    Prostate cancer [facility] [address] 2020    Atrial fibrillation ([facility] [address] 2020    Acute blood loss anemia 2020    [facility]    Central sleep apnea 2020    CAD (coronary artery disease) 08/10/2020    S/P CABG x 2 08/10/2020    Abnormal cardiovascular stress test 2020    Precordial pain 2020    EKG, abnormal 2020    Obstructive sleep apnea syndrome 2020    Renal cyst 2018      Past Surgical History:   Procedure Laterality Date    CARDIAC CATHETERIZATION  2020    CATARACT REMOVAL Bilateral     IOL    COLONOSCOPY      poplyp removed    CYST REMOVAL Left     shoulder    OTHER SURGICAL HISTORY      prostate radiation    DC APPENDECTOMY      TURP       Family History   Problem Relation Age of Onset    Stroke Father     Cancer Father     Stroke Mother     Pulmonary Embolism Brother     Heart Disease Mother     Heart Disease Father      Social History     Tobacco Use    Smoking status: Former Smoker     Quit date: 1960     Years since quittin.5    Smokeless tobacco: Never Used   Substance Use Topics    Alcohol use: No         ROS:    No obvious pertinent positives on review of systems except for what was outlined in the HPI today.       PHYSICAL EXAM:     /64   Pulse 72   Resp 18   Ht 6' 4\" (1.93 m)   Wt 187 lb (84.8 kg)   BMI 22.76 kg/m²    General/Constitutional:   Alert and oriented x 3, no acute distress  HEENT:   normocephalic, atraumatic, moist mucous membranes  Neck:   No JVD or carotid bruits bilaterally  Cardiovascular:   regular rate and rhythm, no murmur/rub/gallop appreciated  Pulmonary:   clear to auscultation bilaterally, no respiratory distress  Abdomen:   soft, non-tender, non-distended  Ext:   No sig LE edema bilaterally  Skin:  warm and dry, no obvious rashes seen  Neuro:   no obvious sensory or motor deficits  Psychiatric:   normal mood and affect      Lab Results   Component Value Date/Time     02/09/2022 10:40 AM    K 4.4 02/09/2022 10:40 AM     02/09/2022 10:40 AM    CO2 23 02/09/2022 10:40 AM    BUN 29 02/09/2022 10:40 AM    CREATININE 1.32 02/09/2022 10:40 AM    GLUCOSE 77 02/09/2022 10:40 AM    CALCIUM 9.3 02/09/2022 10:40 AM        Lab Results   Component Value Date    WBC 8.6 02/09/2022    HGB 11.9 (L) 02/09/2022    HCT 36.1 (L) 02/09/2022    MCV 89 02/09/2022     02/09/2022       Lab Results   Component Value Date    TSH 1.270 10/23/2020       Lab Results   Component Value Date    LABA1C 5.3 02/25/2021     Lab Results   Component Value Date     02/25/2021       Lab Results   Component Value Date    CHOL 155 02/09/2022    CHOL 169 02/25/2021     Lab Results   Component Value Date    TRIG 65 02/09/2022    TRIG 74 02/25/2021     Lab Results   Component Value Date    HDL 58 02/09/2022    HDL 60 02/25/2021     Lab Results   Component Value Date    LDLCALC 84 02/09/2022    [address] 02/25/2021     Lab Results   Component Value Date    VLDL 13 02/09/2022    VLDL 14 02/25/2021     No results found for: CHOLIDALIA        I have Independently reviewed prior care notes, any ER records available, cardiac testing, labs and results with the patient and before seeing the patient today. Also independently reviewed outside records when available. ASSESSMENT:    Anahi Johnson was seen today for atrial fibrillation. Diagnoses and all orders for this visit:    Coronary artery disease involving native coronary artery of native heart without angina pectoris    Stage 3a chronic kidney disease (HCC)    Paroxysmal atrial fibrillation (HCC)    S/P CABG x 2    Parkinson disease ([facility]          PLAN:      1. CAD:     Remain on ASA 81 daily, he stopped the statin. Follow. Dealing with Parkinson's   Declined cardiac rehab. Never did rehab. The patient has been instructed to call with any angina or equivalent as reviewed today. All questions were answered with the patient voicing complete understanding. 2. AILEEN:  On  BiPAP. 3. HPL: recommend statin. He does not want it. Worried about dementia. 4 PAF:  Follow for more sx. Seems like fall risk. Weak overall, no AC candidate. CAll PRN for more palp. Patient has been instructed and agrees to call our office with any issues or other concerns related to their cardiac condition(s) and/or complaint(s). Return in about 6 months (around 1/11/2023).        EDGAR ZHU, DO  7/11/2022

## 2022-08-05 PROBLEM — Z76.89 ENCOUNTER FOR MEDICATION ADMINISTRATION: Status: ACTIVE | Noted: 2022-08-05

## 2022-08-05 SDOH — ECONOMIC STABILITY: FOOD INSECURITY: WITHIN THE PAST 12 MONTHS, THE FOOD YOU BOUGHT JUST DIDN'T LAST AND YOU DIDN'T HAVE MONEY TO GET MORE.: NEVER TRUE

## 2022-08-05 SDOH — ECONOMIC STABILITY: FOOD INSECURITY: WITHIN THE PAST 12 MONTHS, YOU WORRIED THAT YOUR FOOD WOULD RUN OUT BEFORE YOU GOT MONEY TO BUY MORE.: NEVER TRUE

## 2022-08-05 ASSESSMENT — PATIENT HEALTH QUESTIONNAIRE - PHQ9
SUM OF ALL RESPONSES TO PHQ QUESTIONS 1-9: 0
SUM OF ALL RESPONSES TO PHQ QUESTIONS 1-9: 0
SUM OF ALL RESPONSES TO PHQ9 QUESTIONS 1 & 2: 0
2. FEELING DOWN, DEPRESSED OR HOPELESS: 0
SUM OF ALL RESPONSES TO PHQ QUESTIONS 1-9: 0
SUM OF ALL RESPONSES TO PHQ QUESTIONS 1-9: 0
1. LITTLE INTEREST OR PLEASURE IN DOING THINGS: 0

## 2022-08-05 ASSESSMENT — LIFESTYLE VARIABLES
HOW OFTEN DO YOU HAVE A DRINK CONTAINING ALCOHOL: NEVER
HOW MANY STANDARD DRINKS CONTAINING ALCOHOL DO YOU HAVE ON A TYPICAL DAY: PATIENT DOES NOT DRINK

## 2022-08-05 ASSESSMENT — SOCIAL DETERMINANTS OF HEALTH (SDOH): HOW HARD IS IT FOR YOU TO PAY FOR THE VERY BASICS LIKE FOOD, HOUSING, MEDICAL CARE, AND HEATING?: NOT HARD AT ALL

## 2022-08-08 ENCOUNTER — OFFICE VISIT (OUTPATIENT)
Dept: INTERNAL MEDICINE CLINIC | Facility: CLINIC | Age: 82
End: 2022-08-08
Payer: MEDICARE

## 2022-08-08 VITALS
SYSTOLIC BLOOD PRESSURE: 116 MMHG | HEIGHT: 76 IN | OXYGEN SATURATION: 95 % | BODY MASS INDEX: 21.9 KG/M2 | HEART RATE: 92 BPM | DIASTOLIC BLOOD PRESSURE: 59 MMHG | RESPIRATION RATE: 16 BRPM | TEMPERATURE: 97.1 F | WEIGHT: 179.8 LBS

## 2022-08-08 DIAGNOSIS — R11.0 NAUSEA: ICD-10-CM

## 2022-08-08 DIAGNOSIS — K59.01 SLOW TRANSIT CONSTIPATION: ICD-10-CM

## 2022-08-08 DIAGNOSIS — I48.0 PAROXYSMAL ATRIAL FIBRILLATION (HCC): ICD-10-CM

## 2022-08-08 DIAGNOSIS — D69.6 THROMBOCYTOPENIA (HCC): ICD-10-CM

## 2022-08-08 DIAGNOSIS — Z76.89 ENCOUNTER FOR MEDICATION ADMINISTRATION: ICD-10-CM

## 2022-08-08 DIAGNOSIS — I25.10 CORONARY ARTERY DISEASE INVOLVING NATIVE CORONARY ARTERY OF NATIVE HEART WITHOUT ANGINA PECTORIS: Primary | ICD-10-CM

## 2022-08-08 DIAGNOSIS — G20 PARKINSON DISEASE (HCC): ICD-10-CM

## 2022-08-08 DIAGNOSIS — R53.82 CHRONIC FATIGUE: ICD-10-CM

## 2022-08-08 DIAGNOSIS — R30.0 DYSURIA: ICD-10-CM

## 2022-08-08 DIAGNOSIS — C61 PROSTATE CANCER (HCC): ICD-10-CM

## 2022-08-08 DIAGNOSIS — G47.33 OBSTRUCTIVE SLEEP APNEA SYNDROME: ICD-10-CM

## 2022-08-08 DIAGNOSIS — N18.31 STAGE 3A CHRONIC KIDNEY DISEASE (HCC): ICD-10-CM

## 2022-08-08 DIAGNOSIS — Z00.00 MEDICARE ANNUAL WELLNESS VISIT, SUBSEQUENT: ICD-10-CM

## 2022-08-08 PROBLEM — R31.29 MICROSCOPIC HEMATURIA: Status: RESOLVED | Noted: 2020-09-01 | Resolved: 2022-08-08

## 2022-08-08 PROBLEM — R07.2 PRECORDIAL PAIN: Status: RESOLVED | Noted: 2020-07-09 | Resolved: 2022-08-08

## 2022-08-08 PROBLEM — Z95.1 S/P CABG X 2: Status: RESOLVED | Noted: 2020-08-10 | Resolved: 2022-08-08

## 2022-08-08 PROBLEM — G47.31 CENTRAL SLEEP APNEA: Status: RESOLVED | Noted: 2020-08-11 | Resolved: 2022-08-08

## 2022-08-08 PROBLEM — R94.31 EKG, ABNORMAL: Status: RESOLVED | Noted: 2020-07-09 | Resolved: 2022-08-08

## 2022-08-08 PROBLEM — R20.2 PARESTHESIA: Status: RESOLVED | Noted: 2021-02-04 | Resolved: 2022-08-08

## 2022-08-08 PROBLEM — G47.52 REM BEHAVIORAL DISORDER: Status: RESOLVED | Noted: 2020-09-03 | Resolved: 2022-08-08

## 2022-08-08 PROBLEM — R35.0 FREQUENCY OF URINATION: Status: RESOLVED | Noted: 2020-09-01 | Resolved: 2022-08-08

## 2022-08-08 PROBLEM — N30.40 RADIATION CYSTITIS: Status: RESOLVED | Noted: 2020-09-01 | Resolved: 2022-08-08

## 2022-08-08 PROBLEM — D62 ACUTE BLOOD LOSS ANEMIA: Status: RESOLVED | Noted: 2020-08-13 | Resolved: 2022-08-08

## 2022-08-08 PROBLEM — R94.39 ABNORMAL CARDIOVASCULAR STRESS TEST: Status: RESOLVED | Noted: 2020-07-31 | Resolved: 2022-08-08

## 2022-08-08 PROBLEM — R39.14 FEELING OF INCOMPLETE BLADDER EMPTYING: Status: RESOLVED | Noted: 2020-09-01 | Resolved: 2022-08-08

## 2022-08-08 LAB
ANION GAP SERPL CALC-SCNC: 2 MMOL/L (ref 7–16)
BASOPHILS # BLD: 0.1 K/UL (ref 0–0.2)
BASOPHILS NFR BLD: 1 % (ref 0–2)
BILIRUBIN, URINE, POC: ABNORMAL
BLOOD URINE, POC: ABNORMAL
BUN SERPL-MCNC: 26 MG/DL (ref 8–23)
CALCIUM SERPL-MCNC: 9.1 MG/DL (ref 8.3–10.4)
CHLORIDE SERPL-SCNC: 103 MMOL/L (ref 98–107)
CO2 SERPL-SCNC: 27 MMOL/L (ref 21–32)
CREAT SERPL-MCNC: 1.3 MG/DL (ref 0.8–1.5)
DIFFERENTIAL METHOD BLD: ABNORMAL
EOSINOPHIL # BLD: 0.1 K/UL (ref 0–0.8)
EOSINOPHIL NFR BLD: 1 % (ref 0.5–7.8)
ERYTHROCYTE [DISTWIDTH] IN BLOOD BY AUTOMATED COUNT: 13.4 % (ref 11.9–14.6)
GLUCOSE SERPL-MCNC: 104 MG/DL (ref 65–100)
GLUCOSE URINE, POC: NEGATIVE
HCT VFR BLD AUTO: 37.9 % (ref 41.1–50.3)
HGB BLD-MCNC: 11.4 G/DL (ref 13.6–17.2)
IMM GRANULOCYTES # BLD AUTO: 0 K/UL (ref 0–0.5)
IMM GRANULOCYTES NFR BLD AUTO: 0 % (ref 0–5)
KETONES, URINE, POC: NEGATIVE
LEUKOCYTE ESTERASE, URINE, POC: ABNORMAL
LYMPHOCYTES # BLD: 0.9 K/UL (ref 0.5–4.6)
LYMPHOCYTES NFR BLD: 11 % (ref 13–44)
MCH RBC QN AUTO: 28.9 PG (ref 26.1–32.9)
MCHC RBC AUTO-ENTMCNC: 30.1 G/DL (ref 31.4–35)
MCV RBC AUTO: 96.2 FL (ref 79.6–97.8)
MONOCYTES # BLD: 0.8 K/UL (ref 0.1–1.3)
MONOCYTES NFR BLD: 10 % (ref 4–12)
NEUTS SEG # BLD: 6.9 K/UL (ref 1.7–8.2)
NEUTS SEG NFR BLD: 78 % (ref 43–78)
NITRITE, URINE, POC: POSITIVE
NRBC # BLD: 0 K/UL (ref 0–0.2)
PH, URINE, POC: 6 (ref 4.6–8)
PLATELET # BLD AUTO: 309 K/UL (ref 150–450)
PMV BLD AUTO: 12 FL (ref 9.4–12.3)
POTASSIUM SERPL-SCNC: 4.7 MMOL/L (ref 3.5–5.1)
PROTEIN,URINE, POC: NEGATIVE
RBC # BLD AUTO: 3.94 M/UL (ref 4.23–5.6)
SODIUM SERPL-SCNC: 132 MMOL/L (ref 136–145)
SPECIFIC GRAVITY, URINE, POC: 1.02 (ref 1–1.03)
TSH, 3RD GENERATION: 0.61 UIU/ML (ref 0.36–3.74)
URINALYSIS CLARITY, POC: ABNORMAL
URINALYSIS COLOR, POC: YELLOW
UROBILINOGEN, POC: 0.2
WBC # BLD AUTO: 8.9 K/UL (ref 4.3–11.1)

## 2022-08-08 PROCEDURE — 81003 URINALYSIS AUTO W/O SCOPE: CPT | Performed by: INTERNAL MEDICINE

## 2022-08-08 PROCEDURE — G0439 PPPS, SUBSEQ VISIT: HCPCS | Performed by: INTERNAL MEDICINE

## 2022-08-08 PROCEDURE — G8420 CALC BMI NORM PARAMETERS: HCPCS | Performed by: INTERNAL MEDICINE

## 2022-08-08 PROCEDURE — 1036F TOBACCO NON-USER: CPT | Performed by: INTERNAL MEDICINE

## 2022-08-08 PROCEDURE — 99214 OFFICE O/P EST MOD 30 MIN: CPT | Performed by: INTERNAL MEDICINE

## 2022-08-08 PROCEDURE — 1123F ACP DISCUSS/DSCN MKR DOCD: CPT | Performed by: INTERNAL MEDICINE

## 2022-08-08 PROCEDURE — G8427 DOCREV CUR MEDS BY ELIG CLIN: HCPCS | Performed by: INTERNAL MEDICINE

## 2022-08-08 RX ORDER — FAMOTIDINE 20 MG/1
20 TABLET, FILM COATED ORAL NIGHTLY PRN
Qty: 30 TABLET | Refills: 3 | Status: SHIPPED | OUTPATIENT
Start: 2022-08-08 | End: 2022-11-03

## 2022-08-08 ASSESSMENT — PATIENT HEALTH QUESTIONNAIRE - PHQ9
SUM OF ALL RESPONSES TO PHQ9 QUESTIONS 1 & 2: 0
2. FEELING DOWN, DEPRESSED OR HOPELESS: 0
SUM OF ALL RESPONSES TO PHQ QUESTIONS 1-9: 0
SUM OF ALL RESPONSES TO PHQ QUESTIONS 1-9: 0
1. LITTLE INTEREST OR PLEASURE IN DOING THINGS: 0
SUM OF ALL RESPONSES TO PHQ QUESTIONS 1-9: 0
SUM OF ALL RESPONSES TO PHQ QUESTIONS 1-9: 0

## 2022-08-08 NOTE — PROGRESS NOTES
8/8/2022 11:45 AM  [facility] [address] [facility]  [place]  Patient #:  [id number]  YOB: 1940          YOUR LAST HEMOGLOBIN A1CS:   No results found for: HBA1C, [id number]    YOUR LAST LIPID PROFILE:   Lab Results   Component Value Date/Time    CHOL 155 02/09/2022 10:40 AM    HDL 58 02/09/2022 10:40 AM    VLDL 13 02/09/2022 10:40 AM         Lab Results   Component Value Date/Time    GFRAA 58 02/09/2022 10:40 AM    BUN 29 02/09/2022 10:40 AM    NAPOC 141 08/10/2020 12:43 PM     02/09/2022 10:40 AM    K 4.4 02/09/2022 10:40 AM     02/09/2022 10:40 AM    CO2 23 02/09/2022 10:40 AM           History of Present Illness     Chief Complaint   Patient presents with    Medicare AWV     Subsequent     6 Month Follow-Up     Pt presents to the office today for a 6 month follow-up      Morning Sickness     Every morning he is nauseated; he don't throw up; he have to drink a ginger ale to get his stomach to settle down     Constipation     Constipated; last bowel movement was about 3-4 days ago    Fatigue     Weakness; no energy    Urinary Frequency     Frequency; a little bit of burning; started a couple of weeks ago     And this patient is brought in by his daughter he is noted to have increasing problems with nausea especially in the morning he has had no vomiting. He is not eating adequately and has lost weight. Apparently his Parkinson's has worsened slightly and is followed by neurology. Constipation is more of an issue using the bathroom every 3 to 4 days. He describes increased urinary frequency as well and states that last night he did not sleep well due to urinary frequency. He has a history of prostate cancer treated by radiation therapy.   Generally he is more weakened    Mr. Josefina Luong is a 79 y.o. male  who presents for office visit      Allergies   Allergen Reactions    Tamsulosin Other (See Comments)    Adhesive Tape Rash     Past Medical History:   Diagnosis Date    Arthritis     OA generalized    Buerger's disease ([facility] [address]     left leg blood clot     CAD (coronary artery disease)     Decreased mobility     Gout     Hematuria, microscopic     Hiatal hernia     Personal history of prostate cancer     Prostate cancer ([facility]         Radiation cystitis 2020    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    Sleep apnea      Social History     Socioeconomic History    Marital status:      Spouse name: None    Number of children: None    Years of education: None    Highest education level: None   Tobacco Use    Smoking status: Former     Types: Cigarettes     Quit date: 1960     Years since quittin.6    Smokeless tobacco: Never   Substance and Sexual Activity    Alcohol use: No    Drug use: Never   Social History Narrative    Drinks 1 cup of caffeine per day     Social Determinants of Health     Financial Resource Strain: Low Risk     Difficulty of Paying Living Expenses: Not hard at all   Food Insecurity: No Food Insecurity    Worried About Running Out of Food in the Last Year: Never true    Ran Out of Food in the Last Year: Never true   Physical Activity: Inactive    Days of Exercise per Week: 0 days    Minutes of Exercise per Session: 0 min     Past Surgical History:   Procedure Laterality Date    CARDIAC CATHETERIZATION  2020    CATARACT REMOVAL Bilateral     IOL    COLONOSCOPY      poplyp removed    CYST REMOVAL Left     shoulder    OTHER SURGICAL HISTORY      prostate radiation    OR APPENDECTOMY      TURP       Current Outpatient Medications   Medication Sig Dispense Refill    famotidine (PEPCID) 20 MG tablet Take 1 tablet by mouth nightly as needed (nausea) 30 tablet 3    Multiple Vitamin (MULTIVITAMIN ADULT PO) Take 1 tablet by mouth daily      aspirin 81 MG EC tablet Take 81 mg by mouth daily      carbidopa-levodopa (SINEMET)  MG per tablet Take 2 tablets by mouth 3 times daily      cyanocobalamin 1000 MCG/ML injection INJECT 1ML Judgment: Judgment normal.       Assessment & Plan    Encounter Diagnoses   Name Primary? Coronary artery disease involving native coronary artery of native heart without angina pectoris Yes    Encounter for medication administration     Dysuria     Paroxysmal atrial fibrillation (HCC)     Parkinson disease (HCC)     Obstructive sleep apnea syndrome     Stage 3a chronic kidney disease (HCC)     Prostate cancer (HCC)     Thrombocytopenia (HCC)     Chronic fatigue     Slow transit constipation     Nausea        Current Outpatient Medications   Medication Sig Dispense Refill    famotidine (PEPCID) 20 MG tablet Take 1 tablet by mouth nightly as needed (nausea) 30 tablet 3    Multiple Vitamin (MULTIVITAMIN ADULT PO) Take 1 tablet by mouth daily      aspirin 81 MG EC tablet Take 81 mg by mouth daily      carbidopa-levodopa (SINEMET)  MG per tablet Take 2 tablets by mouth 3 times daily      cyanocobalamin 1000 MCG/ML injection INJECT 1ML INTRAMUSCULARLY ONCE WEEKLY FOR 4 WEEKS THEN START MONTHLY      Melatonin 5 MG CAPS Take by mouth at bedtime       No current facility-administered medications for this visit. Orders Placed This Encounter   Procedures    Culture, Urine     Standing Status:   Future     Number of Occurrences:   1     Standing Expiration Date:   8/8/2023     Order Specific Question:   Specify (ex-cath, midstream, cysto, etc)?      Answer:   mid stream    CBC with Auto Differential     Standing Status:   Future     Number of Occurrences:   1     Standing Expiration Date:   6/9/9965    Basic Metabolic Panel     Standing Status:   Future     Number of Occurrences:   1     Standing Expiration Date:   8/8/2023    TSH     Standing Status:   Future     Number of Occurrences:   1     Standing Expiration Date:   8/8/2023    Ambulatory referral to Urology     Referral Priority:   Routine     Referral Type:   Eval and Treat     Referral Reason:   Specialty Services Required     Requested Specialty: ago), Fatigue (Weakness; no energy), and Urinary Frequency (Frequency; a little bit of burning; started a couple of weeks ago)    Assessment & Plan   Coronary artery disease involving native coronary artery of native heart without angina pectoris  Encounter for medication administration  Dysuria  -     AMB POC URINALYSIS DIP STICK AUTO W/O MICRO  -     Culture, Urine; Future  Paroxysmal atrial fibrillation (HCC)  Parkinson disease ([facility] [address]  Obstructive sleep apnea syndrome  Stage 3a chronic kidney disease ([facility] [address]  Prostate cancer ([facility]  -     Ambulatory referral to Urology  -     PSA, Diagnostic; Future  Thrombocytopenia (HCC)  Chronic fatigue  -     CBC with Auto Differential; Future  -     Basic Metabolic Panel; Future  -     TSH; Future  Slow transit constipation  Nausea  -     famotidine (PEPCID) 20 MG tablet; Take 1 tablet by mouth nightly as needed (nausea), Disp-30 tablet, R-3Normal  Medicare annual wellness visit, subsequent    Recommendations for Preventive Services Due: see orders and patient instructions/AVS.  Recommended screening schedule for the next 5-10 years is provided to the patient in written form: see Patient Instructions/AVS.     Return in about 3 months (around 11/8/2022). Subjective   The following acute and/or chronic problems were also addressed today:   Diagnosis Orders   1. Coronary artery disease involving native coronary artery of native heart without angina pectoris        2. Encounter for medication administration        3. Dysuria  AMB POC URINALYSIS DIP STICK AUTO W/O MICRO    Culture, Urine    Culture, Urine      4. Paroxysmal atrial fibrillation (HCC)        5. Parkinson disease ([facility] [address]        6. Obstructive sleep apnea syndrome        7. Stage 3a chronic kidney disease ([facility] [address]        8. Prostate cancer [facility])  Ambulatory referral to Urology    PSA, Diagnostic    PSA, Diagnostic      9. Thrombocytopenia ([facility] [address]        10.  Chronic fatigue  CBC with Auto Differential    Basic Metabolic Panel    TSH    CBC with Auto Differential    Basic Metabolic Panel    TSH      11. Slow transit constipation        12. Nausea  famotidine (PEPCID) 20 MG tablet      13. Medicare annual wellness visit, subsequent             Patient's complete Health Risk Assessment and screening values have been reviewed and are found in Flowsheets. The following problems were reviewed today and where indicated follow up appointments were made and/or referrals ordered. Positive Risk Factor Screenings with Interventions:             General Health and ACP:  General  In general, how would you say your health is?: Fair  In the past 7 days, have you experienced any of the following: New or Increased Pain, New or Increased Fatigue, Loneliness, Social Isolation, Stress or Anger?: No  Do you get the social and emotional support that you need?: Yes  Do you have a Living Will?: (!) No    Advance Directives       Power of  Living Will ACP-Advance Directive ACP-Power of     Not on File Not on File Not on File Not on File          General Health Risk Interventions:  done    Health Habits/Nutrition:  Physical Activity: Inactive    Days of Exercise per Week: 0 days    Minutes of Exercise per Session: 0 min     Have you lost any weight without trying in the past 3 months?: No  Body mass index: 21.88  Have you seen the dentist within the past year?: (!) No  Health Habits/Nutrition Interventions:  Nutritional issues:  educational materials for healthy, well-balanced diet provided    Hearing/Vision:  Do you or your family notice any trouble with your hearing that hasn't been managed with hearing aids?: No  Do you have difficulty driving, watching TV, or doing any of your daily activities because of your eyesight?: No  Have you had an eye exam within the past year?: (!) No  No results found.   Hearing/Vision Interventions:  none    Safety:  Do you have working smoke detectors?: Yes  Do you have any tripping hazards - loose or unsecured carpets or rugs?: No  Do you have any tripping hazards - clutter in doorways, halls, or stairs?: No  Do you have either shower bars, grab bars, non-slip mats or non-slip surfaces in your shower or bathtub?: (!) No  Do all of your stairways have a railing or banister?: Not Applicable  Do you always fasten your seatbelt when you are in a car?: Yes  Safety Interventions:  Home safety tips provided           Objective   Vitals:    08/08/22 1022   BP: (!) 116/59   Site: Left Upper Arm   Position: Sitting   Cuff Size: Large Adult   Pulse: 92   Resp: 16   Temp: 97.1 °F (36.2 °C)   TempSrc: Temporal   SpO2: 95%   Weight: 179 lb 12.8 oz (81.6 kg)   Height: 6' 4\" (1.93 m)      Body mass index is 21.89 kg/m². done       Allergies   Allergen Reactions    Tamsulosin Other (See Comments)    Adhesive Tape Rash     Prior to Visit Medications    Medication Sig Taking?  Authorizing Provider   famotidine (PEPCID) 20 MG tablet Take 1 tablet by mouth nightly as needed (nausea) Yes Chiquita Sanchez MD   Multiple Vitamin (MULTIVITAMIN ADULT PO) Take 1 tablet by mouth daily Yes Historical Provider, MD   aspirin 81 MG EC tablet Take 81 mg by mouth daily Yes Ar Automatic Reconciliation   carbidopa-levodopa (SINEMET)  MG per tablet Take 2 tablets by mouth 3 times daily Yes Ar Automatic Reconciliation   cyanocobalamin 1000 MCG/ML injection INJECT 1ML INTRAMUSCULARLY ONCE WEEKLY FOR 4 WEEKS THEN START MONTHLY Yes Ar Automatic Reconciliation   Melatonin 5 MG CAPS Take by mouth at bedtime Yes Ar Automatic Reconciliation       CareTeam (Including outside providers/suppliers regularly involved in providing care):   Patient Care Team:  Chiquita Sanchez MD as PCP - Rodrigo Ngo MD as PCP - [facility] Empaneled Provider     Reviewed and updated this visit:  Tobacco  Allergies  Meds  Med Hx  Surg Hx  Soc Hx  Fam Hx

## 2022-08-09 LAB — PSA SERPL-MCNC: <0.1 NG/ML

## 2022-08-09 NOTE — PATIENT INSTRUCTIONS
Personalized Preventive Plan for Reginaldo Galloway - 8/8/2022  Medicare offers a range of preventive health benefits. Some of the tests and screenings are paid in full while other may be subject to a deductible, co-insurance, and/or copay. Some of these benefits include a comprehensive review of your medical history including lifestyle, illnesses that may run in your family, and various assessments and screenings as appropriate. After reviewing your medical record and screening and assessments performed today your provider may have ordered immunizations, labs, imaging, and/or referrals for you. A list of these orders (if applicable) as well as your Preventive Care list are included within your After Visit Summary for your review. Other Preventive Recommendations:    A preventive eye exam performed by an eye specialist is recommended every 1-2 years to screen for glaucoma; cataracts, macular degeneration, and other eye disorders. A preventive dental visit is recommended every 6 months. Try to get at least 150 minutes of exercise per week or 10,000 steps per day on a pedometer . Order or download the FREE \"Exercise & Physical Activity: Your Everyday Guide\" from [employer]. Call 9-482.305.1993 or search [employer] online. You need 1445-7179 mg of calcium and 1868-5681 IU of vitamin D per day. It is possible to meet your calcium requirement with diet alone, but a vitamin D supplement is usually necessary to meet this goal.  When exposed to the sun, use a sunscreen that protects against both UVA and UVB radiation with an SPF of 30 or greater. Reapply every 2 to 3 hours or after sweating, drying off with a towel, or swimming. Always wear a seat belt when traveling in a car. Always wear a helmet when riding a bicycle or motorcycle.

## 2022-08-10 ENCOUNTER — TELEPHONE (OUTPATIENT)
Dept: INTERNAL MEDICINE CLINIC | Facility: CLINIC | Age: 82
End: 2022-08-10

## 2022-08-10 RX ORDER — CEPHALEXIN 500 MG/1
500 CAPSULE ORAL 3 TIMES DAILY
Qty: 30 CAPSULE | Refills: 0 | Status: SHIPPED | OUTPATIENT
Start: 2022-08-10 | End: 2022-08-20

## 2022-08-10 NOTE — TELEPHONE ENCOUNTER
----- Message from Gildardo Wilcox MD sent at 8/10/2022  8:24 AM EDT -----  Call with the patient. Labs were okay  except sodium is slightly low, and he appears to have a urinary tract infection.   Start Keflex 500 mg 3 times daily #30, be sure he does not drink excessive fluids cms

## 2022-08-10 NOTE — TELEPHONE ENCOUNTER
This has been fully explained to the patient's daughter who indicates understanding.   Rx pending below

## 2022-08-12 LAB
BACTERIA SPEC CULT: ABNORMAL
BACTERIA SPEC CULT: ABNORMAL
SERVICE CMNT-IMP: ABNORMAL

## 2022-08-24 ENCOUNTER — OFFICE VISIT (OUTPATIENT)
Dept: UROLOGY | Age: 82
End: 2022-08-24
Payer: MEDICARE

## 2022-08-24 DIAGNOSIS — C61 MALIGNANT NEOPLASM OF PROSTATE (HCC): ICD-10-CM

## 2022-08-24 DIAGNOSIS — N32.81 OAB (OVERACTIVE BLADDER): ICD-10-CM

## 2022-08-24 DIAGNOSIS — N13.8 BPH WITH OBSTRUCTION/LOWER URINARY TRACT SYMPTOMS: ICD-10-CM

## 2022-08-24 DIAGNOSIS — N39.0 ACUTE UTI: Primary | ICD-10-CM

## 2022-08-24 DIAGNOSIS — N40.1 BPH WITH OBSTRUCTION/LOWER URINARY TRACT SYMPTOMS: ICD-10-CM

## 2022-08-24 LAB
BILIRUBIN, URINE, POC: NEGATIVE
BLOOD URINE, POC: NORMAL
GLUCOSE URINE, POC: NEGATIVE
KETONES, URINE, POC: NEGATIVE
LEUKOCYTE ESTERASE, URINE, POC: NEGATIVE
NITRITE, URINE, POC: NEGATIVE
PH, URINE, POC: 6 (ref 4.6–8)
PROTEIN,URINE, POC: NEGATIVE
PVR, POC: 0 CC
SPECIFIC GRAVITY, URINE, POC: 1.01 (ref 1–1.03)
URINALYSIS CLARITY, POC: NORMAL
URINALYSIS COLOR, POC: NORMAL
UROBILINOGEN, POC: NORMAL

## 2022-08-24 PROCEDURE — G8427 DOCREV CUR MEDS BY ELIG CLIN: HCPCS | Performed by: NURSE PRACTITIONER

## 2022-08-24 PROCEDURE — G8420 CALC BMI NORM PARAMETERS: HCPCS | Performed by: NURSE PRACTITIONER

## 2022-08-24 PROCEDURE — 1036F TOBACCO NON-USER: CPT | Performed by: NURSE PRACTITIONER

## 2022-08-24 PROCEDURE — 51798 US URINE CAPACITY MEASURE: CPT | Performed by: NURSE PRACTITIONER

## 2022-08-24 PROCEDURE — 99214 OFFICE O/P EST MOD 30 MIN: CPT | Performed by: NURSE PRACTITIONER

## 2022-08-24 PROCEDURE — 1123F ACP DISCUSS/DSCN MKR DOCD: CPT | Performed by: NURSE PRACTITIONER

## 2022-08-24 PROCEDURE — 81003 URINALYSIS AUTO W/O SCOPE: CPT | Performed by: NURSE PRACTITIONER

## 2022-08-24 RX ORDER — VIBEGRON 75 MG/1
75 TABLET, FILM COATED ORAL DAILY
Qty: 30 TABLET | Refills: 0 | COMMUNITY
Start: 2022-08-24

## 2022-08-24 ASSESSMENT — ENCOUNTER SYMPTOMS
BACK PAIN: 0
ABDOMINAL PAIN: 0

## 2022-08-24 NOTE — PROGRESS NOTES
[facility]  [address]  Shirley Stevenson [address], [address] [facility]  [phone]          Arnol Carroll  : 1940    Chief Complaint   Patient presents with    Follow-up     1 year-PCA/Incomplete bladder empty          HPI     Arnol Carroll is a 79 y.o. male prev followed by Dr Melody Clifton. H/O prostate CA. Underwent radiation tx and hormone deprivation in . PSA  remains UND. H/O previous TURP. H/O renal cysts. CT 2018 showed stable benign renal cysts. No recent imaging. sCr 1.30 ()    Had UTI earlier this month. Urine culture 22 grew E Coli. Completed antibiotic for this. No UTI sx today. He has a long h/o urinary frequency and urgency. He reports nocturia x 2-3/night. NO weak stream. He was previously on Flomax but had to stop this d/t SE. He has also tried Vesicare, oxybutynin, and myrbetriq. PVR 0 cc via US today. Patient also has h/o Parksinson's disease.        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    Sleep apnea      Past Surgical History:   Procedure Laterality Date    CARDIAC CATHETERIZATION  2020    CATARACT REMOVAL Bilateral     IOL    COLONOSCOPY      poplyp removed    CYST REMOVAL Left     shoulder    OTHER SURGICAL HISTORY      prostate radiation    [place] APPENDECTOMY      TURP       Current Outpatient Medications   Medication Sig Dispense Refill    Vibegron (GEMTESA) 75 MG TABS Take 75 mg by mouth daily 30 tablet 0    famotidine (PEPCID) 20 MG tablet Take 1 tablet by mouth nightly as needed (nausea) 30 tablet 3    Multiple Vitamin (MULTIVITAMIN ADULT PO) Take 1 tablet by mouth daily      aspirin 81 MG EC tablet Take 81 mg by mouth daily      carbidopa-levodopa (SINEMET)  MG per tablet Take 2 tablets by mouth 3 times daily      cyanocobalamin 1000 MCG/ML injection INJECT 1ML INTRAMUSCULARLY ONCE WEEKLY FOR 4 WEEKS THEN START MONTHLY      Melatonin 5 MG CAPS Take by mouth at bedtime       No current facility-administered medications for this visit. Allergies   Allergen Reactions    Tamsulosin Other (See Comments)    Adhesive Tape Rash     Social History     Socioeconomic History    Marital status:      Spouse name: Not on file    Number of children: Not on file    Years of education: Not on file    Highest education level: Not on file   Occupational History    Not on file   Tobacco Use    Smoking status: Former     Types: Cigarettes     Quit date: 1960     Years since quittin.6    Smokeless tobacco: Never   Substance and Sexual Activity    Alcohol use: No    Drug use: Never    Sexual activity: Not on file   Other Topics Concern    Not on file   Social History Narrative    Drinks 1 cup of caffeine per day     Social Determinants of Health     Financial Resource Strain: Low Risk     Difficulty of Paying Living Expenses: Not hard at all   Food Insecurity: No Food Insecurity    Worried About Running Out of Food in the Last Year: Never true    Ran Out of Food in the Last Year: Never true   Transportation Needs: Not on file   Physical Activity: Inactive    Days of Exercise per Week: 0 days    Minutes of Exercise per Session: 0 min   Stress: Not on file   Social Connections: Not on file   Intimate Partner Violence: Not on file   Housing Stability: Not on file     Family History   Problem Relation Age of Onset    Stroke Father     Cancer Father     Stroke Mother     Pulmonary Embolism Brother     Heart Disease Mother     Heart Disease Father        Review of Systems  Constitutional:   Negative for fever. GI:  Negative for abdominal pain. Genitourinary: Positive for incomplete emptying.   Musculoskeletal:  Negative for back pain. Urinalysis  UA - Dipstick  Results for orders placed or performed in visit on 08/24/22   AMB POC URINALYSIS DIP STICK AUTO W/O MICRO   Result Value Ref Range    Color (UA POC)      Clarity (UA POC)      Glucose, Urine, POC Negative Negative    Bilirubin, Urine, POC Negative Negative    KETONES, Urine, POC Negative Negative    Specific Gravity, Urine, POC 1.015 1.001 - 1.035    Blood (UA POC) Small Negative    pH, Urine, POC 6.0 4.6 - 8.0    Protein, Urine, POC Negative Negative    Urobilinogen, POC 0.2 mg/dL     Nitrite, Urine, POC Negative Negative    Leukocyte Esterase, Urine, POC Negative Negative   Results for orders placed or performed in visit on 08/08/22   AMB POC URINALYSIS DIP STICK AUTO W/O MICRO   Result Value Ref Range    Color, Urine, POC yellow     Clarity, Urine, POC cloudy     Glucose, Urine, POC Negative Negative    Bilirubin, Urine, POC 2+ Negative    Ketones, Urine, POC Negative Negative    Specific Gravity, Urine, POC 1.020 1.001 - 1.035    Blood, Urine, POC small Negative    pH, Urine, POC 6.0 4.6 - 8.0    Protein, Urine, POC Negative Negative    Urobilinogen, POC 0.2     Nitrate, Urine, POC Positive Negative    Leukocyte Esterase, Urine, POC 2+ Negative       UA - Micro  WBC - 0  RBC - 0  Bacteria - 0  Epith - 0    PHYSICAL EXAM    General appearance - well appearing and in no distress  Mental status - alert, oriented to person, place, and time  Neck - supple, no significant adenopathy  Chest/Lung-  Quiet, even and easy respiratory effort without use of accessory muscles  Skin - normal coloration and turgor, no rashes      Assessment and Plan    ICD-10-CM    1. Acute UTI  N39.0 AMB POC URINALYSIS DIP STICK AUTO W/O MICRO      2. Malignant neoplasm of prostate (HCC)  C61 AMB POC URINALYSIS DIP STICK AUTO W/O MICRO      3. OAB (overactive bladder)  N32.81 AMB POC PVR, UCHE,POST-VOID RES,US,NON-IMAGING      4.  BPH with obstruction/lower urinary tract symptoms  N40.1     N13.8

## 2022-10-03 ENCOUNTER — OFFICE VISIT (OUTPATIENT)
Dept: UROLOGY | Age: 82
End: 2022-10-03
Payer: MEDICARE

## 2022-10-03 DIAGNOSIS — N39.0 ACUTE UTI: Primary | ICD-10-CM

## 2022-10-03 LAB
BILIRUBIN, URINE, POC: NEGATIVE
BLOOD URINE, POC: NORMAL
GLUCOSE URINE, POC: NEGATIVE
KETONES, URINE, POC: NEGATIVE
LEUKOCYTE ESTERASE, URINE, POC: NORMAL
NITRITE, URINE, POC: NEGATIVE
PH, URINE, POC: 6 (ref 4.6–8)
PROTEIN,URINE, POC: NEGATIVE
SPECIFIC GRAVITY, URINE, POC: 1.01 (ref 1–1.03)
URINALYSIS CLARITY, POC: NORMAL
URINALYSIS COLOR, POC: NORMAL
UROBILINOGEN, POC: NORMAL

## 2022-10-03 PROCEDURE — 81003 URINALYSIS AUTO W/O SCOPE: CPT | Performed by: NURSE PRACTITIONER

## 2022-10-03 RX ORDER — CEPHALEXIN 500 MG/1
500 CAPSULE ORAL 3 TIMES DAILY
Qty: 21 CAPSULE | Refills: 0 | Status: SHIPPED | OUTPATIENT
Start: 2022-10-03 | End: 2022-10-10

## 2022-10-03 NOTE — PROGRESS NOTES
Reason for collection: UTI Recheck    Patient #: [phone]  Pharmacy: [facility]    UA - Dipstick  Results for orders placed or performed in visit on 10/03/22   AMB POC URINALYSIS DIP STICK AUTO W/O MICRO   Result Value Ref Range    Color (UA POC)      Clarity (UA POC)      Glucose, Urine, POC Negative Negative    Bilirubin, Urine, POC Negative Negative    KETONES, Urine, POC Negative Negative    Specific Gravity, Urine, POC 1.010 1.001 - 1.035    Blood (UA POC) Small Negative    pH, Urine, POC 6.0 4.6 - 8.0    Protein, Urine, POC Negative Negative    Urobilinogen, POC 0.2 mg/dL     Nitrite, Urine, POC Negative Negative    Leukocyte Esterase, Urine, POC Large Negative   Results for orders placed or performed in visit on 08/08/22   AMB POC URINALYSIS DIP STICK AUTO W/O MICRO   Result Value Ref Range    Color, Urine, POC yellow     Clarity, Urine, POC cloudy     Glucose, Urine, POC Negative Negative    Bilirubin, Urine, POC 2+ Negative    Ketones, Urine, POC Negative Negative    Specific Gravity, Urine, POC 1.020 1.001 - 1.035    Blood, Urine, POC small Negative    pH, Urine, POC 6.0 4.6 - 8.0    Protein, Urine, POC Negative Negative    Urobilinogen, POC 0.2     Nitrate, Urine, POC Positive Negative    Leukocyte Esterase, Urine, POC 2+ Negative         Requested Prescriptions      No prescriptions requested or ordered in this encounter     Plan    Diagnosis Orders   1. Acute UTI  AMB POC URINALYSIS DIP STICK AUTO W/O MICRO          Urine infected. Culture pending. Keflex sent to pharmacy.     Chas Bob, TGP, APRN - CNP

## 2022-10-03 NOTE — PROGRESS NOTES
Called and spk with patient advising today's urine specimen is being sent for culture and may take up to 72 hours to result. In the meantime an abx has been sent to patient's pharmacy. Patient aware this abx may need to be changed depending on the result of the culture.

## 2022-10-05 ENCOUNTER — OFFICE VISIT (OUTPATIENT)
Dept: UROLOGY | Age: 82
End: 2022-10-05
Payer: MEDICARE

## 2022-10-05 DIAGNOSIS — C61 MALIGNANT NEOPLASM OF PROSTATE (HCC): ICD-10-CM

## 2022-10-05 DIAGNOSIS — N40.1 BPH WITH OBSTRUCTION/LOWER URINARY TRACT SYMPTOMS: ICD-10-CM

## 2022-10-05 DIAGNOSIS — N13.8 BPH WITH OBSTRUCTION/LOWER URINARY TRACT SYMPTOMS: ICD-10-CM

## 2022-10-05 DIAGNOSIS — N39.0 ACUTE UTI: Primary | ICD-10-CM

## 2022-10-05 DIAGNOSIS — N32.81 OAB (OVERACTIVE BLADDER): ICD-10-CM

## 2022-10-05 LAB
BILIRUBIN, URINE, POC: NEGATIVE
BLOOD URINE, POC: ABNORMAL
GLUCOSE URINE, POC: NEGATIVE
KETONES, URINE, POC: ABNORMAL
LEUKOCYTE ESTERASE, URINE, POC: ABNORMAL
NITRITE, URINE, POC: NEGATIVE
PH, URINE, POC: 6 (ref 4.6–8)
PROTEIN,URINE, POC: NEGATIVE
SPECIFIC GRAVITY, URINE, POC: 1.02 (ref 1–1.03)
URINALYSIS CLARITY, POC: ABNORMAL
URINALYSIS COLOR, POC: ABNORMAL
UROBILINOGEN, POC: ABNORMAL

## 2022-10-05 PROCEDURE — G8484 FLU IMMUNIZE NO ADMIN: HCPCS | Performed by: NURSE PRACTITIONER

## 2022-10-05 PROCEDURE — 1036F TOBACCO NON-USER: CPT | Performed by: NURSE PRACTITIONER

## 2022-10-05 PROCEDURE — 81003 URINALYSIS AUTO W/O SCOPE: CPT | Performed by: NURSE PRACTITIONER

## 2022-10-05 PROCEDURE — G8427 DOCREV CUR MEDS BY ELIG CLIN: HCPCS | Performed by: NURSE PRACTITIONER

## 2022-10-05 PROCEDURE — 1123F ACP DISCUSS/DSCN MKR DOCD: CPT | Performed by: NURSE PRACTITIONER

## 2022-10-05 PROCEDURE — 99214 OFFICE O/P EST MOD 30 MIN: CPT | Performed by: NURSE PRACTITIONER

## 2022-10-05 PROCEDURE — G8420 CALC BMI NORM PARAMETERS: HCPCS | Performed by: NURSE PRACTITIONER

## 2022-10-05 NOTE — PROGRESS NOTES
daily 30 tablet 0    famotidine (PEPCID) 20 MG tablet Take 1 tablet by mouth nightly as needed (nausea) 30 tablet 3    Multiple Vitamin (MULTIVITAMIN ADULT PO) Take 1 tablet by mouth daily      aspirin 81 MG EC tablet Take 81 mg by mouth daily      carbidopa-levodopa (SINEMET)  MG per tablet Take 2 tablets by mouth 3 times daily      cyanocobalamin 1000 MCG/ML injection INJECT 1ML INTRAMUSCULARLY ONCE WEEKLY FOR 4 WEEKS THEN START MONTHLY      Melatonin 5 MG CAPS Take by mouth at bedtime       No current facility-administered medications for this visit.      Allergies   Allergen Reactions    Tamsulosin Other (See Comments)    Adhesive Tape Rash     Social History     Socioeconomic History    Marital status:      Spouse name: Not on file    Number of children: Not on file    Years of education: Not on file    Highest education level: Not on file   Occupational History    Not on file   Tobacco Use    Smoking status: Former     Types: Cigarettes     Quit date: 1960     Years since quittin.8    Smokeless tobacco: Never   Substance and Sexual Activity    Alcohol use: No    Drug use: Never    Sexual activity: Not on file   Other Topics Concern    Not on file   Social History Narrative    Drinks 1 cup of caffeine per day     Social Determinants of Health     Financial Resource Strain: Low Risk     Difficulty of Paying Living Expenses: Not hard at all   Food Insecurity: No Food Insecurity    Worried About Running Out of Food in the Last Year: Never true    Ran Out of Food in the Last Year: Never true   Transportation Needs: Not on file   Physical Activity: Inactive    Days of Exercise per Week: 0 days    Minutes of Exercise per Session: 0 min   Stress: Not on file   Social Connections: Not on file   Intimate Partner Violence: Not on file   Housing Stability: Not on file     Family History   Problem Relation Age of Onset    Stroke Father     Cancer Father     Stroke Mother     Pulmonary Embolism Brother Heart Disease Mother     Heart Disease Father        Review of Systems  Genitourinary: Positive for urinary burning, urgency and frequent urination. Urinalysis  UA - Dipstick  Results for orders placed or performed in visit on 10/05/22   AMB POC URINALYSIS DIP STICK AUTO W/O MICRO   Result Value Ref Range    Color (UA POC)      Clarity (UA POC)      Glucose, Urine, POC Negative Negative    Bilirubin, Urine, POC Negative Negative    KETONES, Urine, POC Trace Negative    Specific Gravity, Urine, POC 1.025 1.001 - 1.035    Blood (UA POC) Trace-lysed Negative    pH, Urine, POC 6.0 4.6 - 8.0    Protein, Urine, POC Negative Negative    Urobilinogen, POC 1 mg/dL     Nitrite, Urine, POC Negative Negative    Leukocyte Esterase, Urine, POC Trace Negative   Results for orders placed or performed in visit on 08/08/22   AMB POC URINALYSIS DIP STICK AUTO W/O MICRO   Result Value Ref Range    Color, Urine, POC yellow     Clarity, Urine, POC cloudy     Glucose, Urine, POC Negative Negative    Bilirubin, Urine, POC 2+ Negative    Ketones, Urine, POC Negative Negative    Specific Gravity, Urine, POC 1.020 1.001 - 1.035    Blood, Urine, POC small Negative    pH, Urine, POC 6.0 4.6 - 8.0    Protein, Urine, POC Negative Negative    Urobilinogen, POC 0.2     Nitrate, Urine, POC Positive Negative    Leukocyte Esterase, Urine, POC 2+ Negative       UA - Micro  WBC - 0  RBC - 0  Bacteria - 0  Epith - 0    PHYSICAL EXAM    General appearance - well appearing and in no distress  Mental status - alert, oriented to person, place, and time  Neck - supple, no significant adenopathy  Chest/Lung-  Quiet, even and easy respiratory effort without use of accessory muscles  Skin - normal coloration and turgor, no rashes      Assessment and Plan    ICD-10-CM    1.  Acute UTI  N39.0 AMB POC URINALYSIS DIP STICK AUTO W/O MICRO      2. OAB (overactive bladder)  N32.81 AMB POC PVR, UCHE,POST-VOID RES,US,NON-IMAGING     AMB POC URINALYSIS DIP STICK AUTO W/O MICRO      3. BPH with obstruction/lower urinary tract symptoms  N40.1 AMB POC PVR, UCHE,POST-VOID RES,US,NON-IMAGING    N13.8 AMB POC URINALYSIS DIP STICK AUTO W/O MICRO      4. Malignant neoplasm of prostate ([facility] [address]  C61         Acute UTI- urine normal. Follow culture. Cont Keflex. OAB- improved. Cont Gemtesa 75 mg PO daily. BPH/LUTS- PVR low. No add tx. Prostate CA- UND. Cont to follow. RTO in 4-6 weeks for follow up with me. Advised to call sooner if sx worsen. Evaristo Alcantara, FNP, APRN - CNP  Dr. Jacqueline Mccall is supervising physician today and he approves plan of care.

## 2022-10-06 LAB
BACTERIA SPEC CULT: ABNORMAL
SERVICE CMNT-IMP: ABNORMAL

## 2022-11-03 DIAGNOSIS — R11.0 NAUSEA: ICD-10-CM

## 2022-11-03 RX ORDER — FAMOTIDINE 20 MG/1
20 TABLET, FILM COATED ORAL NIGHTLY PRN
Qty: 90 TABLET | Refills: 1 | Status: SHIPPED | OUTPATIENT
Start: 2022-11-03

## 2022-11-07 NOTE — PROGRESS NOTES
[address] Divine Cartwright. [address] [facility], [address] [facility]  [phone]    Patient Name:  Makeda Boyle  YOB: 1940      Office Visit 11/8/2022    The patient is evaluated by phone. Consent:  The patient/healthcare decision maker is aware that this patient-initiated Telehealth encounter is a billable service, with coverage as determined by his insurance carrier. The patient is aware that he/she may receive a bill and has provided verbal consent to proceed: Yes     I was at the office while conducting this visit. We discussed the expected course, resolution and complications of the diagnosis(es) in detail. Medication risks, benefits, costs, interactions, and alternatives were discussed as indicated. I advised him to contact the office if his condition worsens, changes or fails to improve as anticipated. He expressed understanding with the diagnosis(es) and plan. Pursuant to the emergency declaration under the [address] [place] [address], [facility] waiver authority and the [employer] Act, this Virtual  Visit was conducted, with patient's consent, to reduce the patient's risk of exposure to COVID-19 and provide continuity of care for an established patient. Services were provided only by phone without synchronous discussion virtually to substitute for in-person clinic visit. CHIEF COMPLAINT:    Chief Complaint   Patient presents with    Sleep Apnea    CPAP/BiPAP         HISTORY OF PRESENT ILLNESS:      The patient is evaluated today in virtual follow-up of obstructive sleep apnea and the REM behavior disorder. At his last visit with Tabatha Crawford, he was noted to have ongoing significant mask leaking along with an AHI running between 21 and 29 events an hour. It was felt that he might benefit from a new mask but the patient refused.     The patient's current download indicates 59% compliance over the past 246 days. He is on a BiPAP at 16/10 with a backup rate of 14. Average usage is running about 6.5 hours per night and his AHI is up to 33.0. In addition he has significant mask leaks with a median leak being very high at 80.6 L/min. He stopped using the BiPAP altogether back in September. I spoke with his wife who reports that the patient is actually sleeping much better off of BiPAP and he seems to be much more rested. She also reports that stopping the BiPAP has improved her sleep quality as well. At this point, I think it would be most appropriate to check an overnight oximetry on room air to see how low his oxygen saturations decline with sleep. He really does not appear to be a good candidate for PAP therapy and his wife reports that his sleep quality was extremely poor with the mask in place. The patient's wife also reports that since stopping the BiPAP, his agitated sleep has resolved. He has also abandon use of melatonin treating the REM behavior disorder.     Sleep Medicine 11/8/2022 3/7/2022 12/7/2021   Sitting and reading 2 1 1   Watching TV 2 1 1   Sitting, inactive in a public place (e.g. a theatre or a meeting) 0 0 0   As a passenger in a car for an hour without a break 0 0 1   Lying down to rest in the afternoon when circumstances permit 2 0 1   Sitting and talking to someone 0 0 0   Sitting quietly after a lunch without alcohol 2 1 1   In a car, while stopped for a few minutes in traffic 0 0 0   [place] Sleepiness Score 8 3 5                       Past Medical History:   Diagnosis Date    Arthritis     OA generalized    Buerger's disease ([facility] [address]     left leg blood clot     CAD (coronary artery disease)     Decreased mobility     Gout     Hematuria, microscopic     Hiatal hernia     Personal history of prostate cancer     Prostate cancer ([facility] [address]     2000    Radiation cystitis 9/1/2020    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    Sleep apnea          [unfilled]      Past Surgical History: Procedure Laterality Date    CARDIAC CATHETERIZATION  2020    CATARACT REMOVAL Bilateral     IOL    COLONOSCOPY      poplyp removed    CYST REMOVAL Left     shoulder    OTHER SURGICAL HISTORY      prostate radiation    [place] APPENDECTOMY      TURP         [unfilled]        Social History     Socioeconomic History    Marital status:      Spouse name: Not on file    Number of children: Not on file    Years of education: Not on file    Highest education level: Not on file   Occupational History    Not on file   Tobacco Use    Smoking status: Former     Types: Cigarettes     Quit date: 1960     Years since quittin.8    Smokeless tobacco: Never   Substance and Sexual Activity    Alcohol use: No    Drug use: Never    Sexual activity: Not on file   Other Topics Concern    Not on file   Social History Narrative    Drinks 1 cup of caffeine per day     Social Determinants of Health     Financial Resource Strain: Low Risk     Difficulty of Paying Living Expenses: Not hard at all   Food Insecurity: No Food Insecurity    Worried About Running Out of Food in the Last Year: Never true    Ran Out of Food in the Last Year: Never true   Transportation Needs: Not on file   Physical Activity: Inactive    Days of Exercise per Week: 0 days    Minutes of Exercise per Session: 0 min   Stress: Not on file   Social Connections: Not on file   Intimate Partner Violence: Not on file   Housing Stability: Not on file         Family History   Problem Relation Age of Onset    Stroke Father     Cancer Father     Stroke Mother     Pulmonary Embolism Brother     Heart Disease Mother     Heart Disease Father          Allergies   Allergen Reactions    Tamsulosin Other (See Comments)    Adhesive Tape Rash         Current Outpatient Medications   Medication Sig    famotidine (PEPCID) 20 MG tablet TAKE 1 TABLET BY MOUTH NIGHTLY AS NEEDED (NAUSEA)    Vibegron (GEMTESA) 75 MG TABS Take 75 mg by mouth daily    Multiple Vitamin (MULTIVITAMIN ADULT PO) Take 1 tablet by mouth daily    aspirin 81 MG EC tablet Take 81 mg by mouth daily    carbidopa-levodopa (SINEMET)  MG per tablet Take 2 tablets by mouth 3 times daily    cyanocobalamin 1000 MCG/ML injection INJECT 1ML INTRAMUSCULARLY ONCE WEEKLY FOR 4 WEEKS THEN START MONTHLY    Melatonin 5 MG CAPS Take by mouth at bedtime     No current facility-administered medications for this visit. REVIEW OF SYSTEMS:   CONSTITUTIONAL:   There is no history of fever, chills, night sweats, weight loss, weight gain, persistent fatigue, or lethargy/hypersomnolence. CARDIAC:   No chest pain, pressure, discomfort, palpitations, orthopnea, murmurs, or edema. GI:   No dysphagia, heartburn reflux, nausea/vomiting, diarrhea, abdominal pain, or bleeding. NEURO:   There is no history of AMS, persistent headache, decreased level of consciousness, seizures, or motor or sensory deficits. PHYSICAL EXAM:    There were no vitals filed for this visit. The patient could only be evaluated by phone. The patient's mentation was unremarkable. He appeared to be focused on the questions asked and he had no evidence for abnormal thinking or memory loss. The patient's voice was without evidence of audible wheezing or congestion. He did not appear dyspneic. ASSESSMENT:  (Medical Decision Making)       ICD-10-CM    1. Obstructive sleep apnea syndrome  G47.33 The patient has abandon use of BiPAP therapy for sleep apnea. His numbers on treatment were very poor with very high AHI and very severe mask leaks. It sounds like getting him off of therapy has resulted in much improved sleep quality. I will check an overnight oximetry to assure that he is not desaturating significantly. 2. Parkinson disease ([address]  [facility] This is a likely basis for the REM behavior disorder. He is on Sinemet 3 times daily. 3. RBD (REM behavioral disorder)  G47.52 This is significantly improved off of BiPAP.   He is no longer taking melatonin. His wife reports no episodes of agitated sleep or dreaming. PLAN:    The patient will continue to stay off of BiPAP therapy. Continue to stay off of melatonin. Check overnight oximetry on room air. Follow-up will be in 4 months. Orders Placed This Encounter   Procedures    Pulse oximetry, overnight     Scheduling Instructions:      [address]            Please perform overnight oximetry with the patient breathing room air. Please forward the results to the [facility] sleep center. Ever Melgar MD        No orders of the defined types were placed in this encounter. Ever Melgar MD  Electronically signed    Over 50% of today's office visit was spent in face to face time reviewing test results, prognosis, importance of compliance, education about disease process, benefits of medications, instructions for management of acute flare-ups, and follow up plans. Total face to face time spent with the patient and charting was 16 minutes. Dictated using voice recognition software.   Proof read but unrecognized errors may exist.

## 2022-11-08 ENCOUNTER — OFFICE VISIT (OUTPATIENT)
Dept: INTERNAL MEDICINE CLINIC | Facility: CLINIC | Age: 82
End: 2022-11-08
Payer: MEDICARE

## 2022-11-08 ENCOUNTER — TELEMEDICINE (OUTPATIENT)
Dept: SLEEP MEDICINE | Age: 82
End: 2022-11-08
Payer: MEDICARE

## 2022-11-08 VITALS
SYSTOLIC BLOOD PRESSURE: 124 MMHG | HEIGHT: 76 IN | BODY MASS INDEX: 21.68 KG/M2 | WEIGHT: 178 LBS | TEMPERATURE: 97.9 F | HEART RATE: 94 BPM | RESPIRATION RATE: 16 BRPM | DIASTOLIC BLOOD PRESSURE: 61 MMHG

## 2022-11-08 DIAGNOSIS — N18.31 STAGE 3A CHRONIC KIDNEY DISEASE (HCC): ICD-10-CM

## 2022-11-08 DIAGNOSIS — Z23 NEEDS FLU SHOT: ICD-10-CM

## 2022-11-08 DIAGNOSIS — G47.33 OBSTRUCTIVE SLEEP APNEA SYNDROME: ICD-10-CM

## 2022-11-08 DIAGNOSIS — I25.10 CORONARY ARTERY DISEASE INVOLVING NATIVE CORONARY ARTERY OF NATIVE HEART WITHOUT ANGINA PECTORIS: ICD-10-CM

## 2022-11-08 DIAGNOSIS — C61 PROSTATE CANCER (HCC): ICD-10-CM

## 2022-11-08 DIAGNOSIS — I48.0 PAROXYSMAL ATRIAL FIBRILLATION (HCC): ICD-10-CM

## 2022-11-08 DIAGNOSIS — G20 PARKINSON DISEASE (HCC): ICD-10-CM

## 2022-11-08 DIAGNOSIS — G20 PARKINSON'S DISEASE (HCC): Primary | ICD-10-CM

## 2022-11-08 DIAGNOSIS — G47.52 RBD (REM BEHAVIORAL DISORDER): ICD-10-CM

## 2022-11-08 DIAGNOSIS — G47.33 OBSTRUCTIVE SLEEP APNEA SYNDROME: Primary | ICD-10-CM

## 2022-11-08 DIAGNOSIS — K59.01 SLOW TRANSIT CONSTIPATION: ICD-10-CM

## 2022-11-08 DIAGNOSIS — E87.1 HYPONATREMIA: ICD-10-CM

## 2022-11-08 PROBLEM — D69.6 THROMBOCYTOPENIA (HCC): Status: RESOLVED | Noted: 2020-08-12 | Resolved: 2022-11-08

## 2022-11-08 LAB
ANION GAP SERPL CALC-SCNC: 5 MMOL/L (ref 2–11)
BUN SERPL-MCNC: 38 MG/DL (ref 8–23)
CALCIUM SERPL-MCNC: 8.9 MG/DL (ref 8.3–10.4)
CHLORIDE SERPL-SCNC: 107 MMOL/L (ref 101–110)
CO2 SERPL-SCNC: 28 MMOL/L (ref 21–32)
CREAT SERPL-MCNC: 1.3 MG/DL (ref 0.8–1.5)
GLUCOSE SERPL-MCNC: 108 MG/DL (ref 65–100)
POTASSIUM SERPL-SCNC: 4.4 MMOL/L (ref 3.5–5.1)
SODIUM SERPL-SCNC: 140 MMOL/L (ref 133–143)

## 2022-11-08 PROCEDURE — G8427 DOCREV CUR MEDS BY ELIG CLIN: HCPCS | Performed by: INTERNAL MEDICINE

## 2022-11-08 PROCEDURE — 99214 OFFICE O/P EST MOD 30 MIN: CPT | Performed by: INTERNAL MEDICINE

## 2022-11-08 PROCEDURE — 1123F ACP DISCUSS/DSCN MKR DOCD: CPT | Performed by: INTERNAL MEDICINE

## 2022-11-08 PROCEDURE — G8484 FLU IMMUNIZE NO ADMIN: HCPCS | Performed by: INTERNAL MEDICINE

## 2022-11-08 PROCEDURE — 1036F TOBACCO NON-USER: CPT | Performed by: INTERNAL MEDICINE

## 2022-11-08 PROCEDURE — 99442 PR PHYS/QHP TELEPHONE EVALUATION 11-20 MIN: CPT | Performed by: INTERNAL MEDICINE

## 2022-11-08 PROCEDURE — 90694 VACC AIIV4 NO PRSRV 0.5ML IM: CPT | Performed by: INTERNAL MEDICINE

## 2022-11-08 PROCEDURE — G0008 ADMIN INFLUENZA VIRUS VAC: HCPCS | Performed by: INTERNAL MEDICINE

## 2022-11-08 PROCEDURE — G8420 CALC BMI NORM PARAMETERS: HCPCS | Performed by: INTERNAL MEDICINE

## 2022-11-08 ASSESSMENT — SLEEP AND FATIGUE QUESTIONNAIRES
HOW LIKELY ARE YOU TO NOD OFF OR FALL ASLEEP WHILE WATCHING TV: 2
HOW LIKELY ARE YOU TO NOD OFF OR FALL ASLEEP WHEN YOU ARE A PASSENGER IN A CAR FOR AN HOUR WITHOUT A BREAK: 0
HOW LIKELY ARE YOU TO NOD OFF OR FALL ASLEEP WHILE SITTING QUIETLY AFTER LUNCH WITHOUT ALCOHOL: 2
HOW LIKELY ARE YOU TO NOD OFF OR FALL ASLEEP IN A CAR, WHILE STOPPED FOR A FEW MINUTES IN TRAFFIC: 0
HOW LIKELY ARE YOU TO NOD OFF OR FALL ASLEEP WHILE SITTING AND TALKING TO SOMEONE: 0
HOW LIKELY ARE YOU TO NOD OFF OR FALL ASLEEP WHILE SITTING INACTIVE IN A PUBLIC PLACE: 0
HOW LIKELY ARE YOU TO NOD OFF OR FALL ASLEEP WHILE SITTING AND READING: 2
ESS TOTAL SCORE: 8
HOW LIKELY ARE YOU TO NOD OFF OR FALL ASLEEP WHILE LYING DOWN TO REST IN THE AFTERNOON WHEN CIRCUMSTANCES PERMIT: 2

## 2022-11-08 ASSESSMENT — PATIENT HEALTH QUESTIONNAIRE - PHQ9
SUM OF ALL RESPONSES TO PHQ QUESTIONS 1-9: 0
SUM OF ALL RESPONSES TO PHQ QUESTIONS 1-9: 0
1. LITTLE INTEREST OR PLEASURE IN DOING THINGS: 0
2. FEELING DOWN, DEPRESSED OR HOPELESS: 0
SUM OF ALL RESPONSES TO PHQ9 QUESTIONS 1 & 2: 0
SUM OF ALL RESPONSES TO PHQ QUESTIONS 1-9: 0
SUM OF ALL RESPONSES TO PHQ QUESTIONS 1-9: 0

## 2022-11-08 NOTE — PROGRESS NOTES
11/8/2022 2:48 PM  Location:[facility] [address] INTERNAL MEDICINE  [place]  Patient #:  [id number]  YOB: 1940          YOUR LAST HEMOGLOBIN A1CS:   No results found for: HBA1C, [id number]    YOUR LAST LIPID PROFILE:   Lab Results   Component Value Date/Time    CHOL 155 02/09/2022 10:40 AM    HDL 58 02/09/2022 10:40 AM    VLDL 13 02/09/2022 10:40 AM         Lab Results   Component Value Date/Time    GFRAA >60 08/08/2022 11:18 AM    BUN 26 08/08/2022 11:18 AM    NAPOC 141 08/10/2020 12:43 PM     08/08/2022 11:18 AM    K 4.7 08/08/2022 11:18 AM     08/08/2022 11:18 AM    CO2 27 08/08/2022 11:18 AM           History of Present Illness     Chief Complaint   Patient presents with    3 Month Follow-Up     Pt presents to the office today for a 3 month follow-up      Hernia     Would like the doctor to check the groin on the left side; no pain but its a knot there and he think its a hernia     This patient states that his weakness is slightly worsened. His Parkinson's disease is unchanged except for some slight increased tremor. He is also developed what he feels is a hernia in his left groin area.   No pain is noted    Mr. Queen Sarmiento is a 79 y.o. 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    Sleep apnea      Social History     Socioeconomic History    Marital status:      Spouse name: None    Number of children: None    Years of education: None    Highest education level: None   Tobacco Use    Smoking status: Former Types: Cigarettes     Quit date: 1960     Years since quittin.8    Smokeless tobacco: Never   Substance and Sexual Activity    Alcohol use: No    Drug use: Never   Social History Narrative    Drinks 1 cup of caffeine per day     Social Determinants of Health     Financial Resource Strain: Low Risk     Difficulty of Paying Living Expenses: Not hard at all   Food Insecurity: No Food Insecurity    Worried About Running Out of Food in the Last Year: Never true    Ran Out of Food in the Last Year: Never true   Physical Activity: Inactive    Days of Exercise per Week: 0 days    Minutes of Exercise per Session: 0 min     Past Surgical History:   Procedure Laterality Date    CARDIAC CATHETERIZATION  2020    CATARACT REMOVAL Bilateral     IOL    COLONOSCOPY      poplyp removed    CYST REMOVAL Left     shoulder    OTHER SURGICAL HISTORY      prostate radiation    [place] APPENDECTOMY      TURP       Current Outpatient Medications   Medication Sig Dispense Refill    famotidine (PEPCID) 20 MG tablet TAKE 1 TABLET BY MOUTH NIGHTLY AS NEEDED (NAUSEA) 90 tablet 1    Vibegron (GEMTESA) 75 MG TABS Take 75 mg by mouth daily 30 tablet 0    Multiple Vitamin (MULTIVITAMIN ADULT PO) Take 1 tablet by mouth daily      aspirin 81 MG EC tablet Take 81 mg by mouth daily      carbidopa-levodopa (SINEMET)  MG per tablet Take 2 tablets by mouth 3 times daily      cyanocobalamin 1000 MCG/ML injection INJECT 1ML INTRAMUSCULARLY ONCE WEEKLY FOR 4 WEEKS THEN START MONTHLY      Melatonin 5 MG CAPS Take by mouth at bedtime       No current facility-administered medications for this visit.      Health Maintenance   Topic Date Due    DTaP/Tdap/Td vaccine (1 - Tdap) Never done    Shingles vaccine (2 of 2) 02/15/2020    Depression Screen  2023    Annual Wellness Visit (AWV)  2023    Prostate Specific Antigen (PSA) Screening or Monitoring  2023    Flu vaccine  Completed    Pneumococcal 65+ years Vaccine  Completed COVID-19 Vaccine  Completed    Hepatitis A vaccine  Aged Out    Hib vaccine  Aged Out    Meningococcal (ACWY) vaccine  Aged Out     Family History   Problem Relation Age of Onset    Stroke Father     Cancer Father     Stroke Mother     Pulmonary Embolism Brother     Heart Disease Mother     Heart Disease Father              Review of Systems  Review of Systems    /61 (Site: Left Upper Arm, Position: Sitting, Cuff Size: Large Adult)   Pulse 94   Temp 97.9 °F (36.6 °C) (Temporal)   Resp 16   Ht 6' 4\" (1.93 m)   Wt 178 lb (80.7 kg)   BMI 21.67 kg/m²       Physical Exam    Physical Exam  Constitutional:       General: He is not in acute distress. Appearance: Normal appearance. He is not ill-appearing. Comments: elderly   HENT:      Head: Normocephalic and atraumatic. Eyes:      Extraocular Movements: Extraocular movements intact. Pupils: Pupils are equal, round, and reactive to light. Cardiovascular:      Rate and Rhythm: Normal rate and regular rhythm. Pulmonary:      Effort: Pulmonary effort is normal.      Breath sounds: Normal breath sounds. Skin:     General: Skin is warm. Neurological:      Mental Status: He is alert. Motor: Tremor present. No weakness. Psychiatric:         Mood and Affect: Mood normal.         Behavior: Behavior normal.         Thought Content: Thought content normal.         Judgment: Judgment normal.       Assessment & Plan    Encounter Diagnoses   Name Primary?     Parkinson's disease ([address] Yes    Needs flu shot     Paroxysmal atrial fibrillation ([address]     Coronary artery disease involving native coronary artery of native heart without angina pectoris     Obstructive sleep apnea syndrome     Slow transit constipation     Parkinson disease ([address]     Stage 3a chronic kidney disease (HCC)     Prostate cancer (HCC)     Hyponatremia        Current Outpatient Medications   Medication Sig Dispense Refill    famotidine (PEPCID) 20 MG tablet TAKE 1 TABLET BY MOUTH NIGHTLY AS NEEDED (NAUSEA) 90 tablet 1    Vibegron (GEMTESA) 75 MG TABS Take 75 mg by mouth daily 30 tablet 0    Multiple Vitamin (MULTIVITAMIN ADULT PO) Take 1 tablet by mouth daily      aspirin 81 MG EC tablet Take 81 mg by mouth daily      carbidopa-levodopa (SINEMET)  MG per tablet Take 2 tablets by mouth 3 times daily      cyanocobalamin 1000 MCG/ML injection INJECT 1ML INTRAMUSCULARLY ONCE WEEKLY FOR 4 WEEKS THEN START MONTHLY      Melatonin 5 MG CAPS Take by mouth at bedtime       No current facility-administered medications for this visit. Orders Placed This Encounter   Procedures    Influenza, FLUAD, (age 72 y+), IM, Preservative Free, 0.5 mL    Basic Metabolic Panel     Standing Status:   Future     Number of Occurrences:   1     Standing Expiration Date:   11/8/2023       There are no discontinued medications. 1. Parkinson's disease ([facility] [address]  With some tremor and dyskinesia    2. Needs flu shot     - Influenza, FLUAD, (age 72 y+), IM, Preservative Free, 0.5 mL    3. Paroxysmal atrial fibrillation (HCC)  Stable     4. Coronary artery disease involving native coronary artery of native heart without angina pectoris  No chest pain noted    5. Obstructive sleep apnea syndrome       6. Slow transit constipation  On MiraLAX as needed    7. Parkinson disease ([facility] [address]       8. Stage 3a chronic kidney disease ([facility] [address]       9. Prostate cancer [facility])  As urology appointment tomorrow last PSA was 0    10. Hyponatremia  Sodium 132 noted last visit  - Basic Metabolic Panel; Future  - Basic Metabolic Panel      No follow-up provider specified.         Stephen Tapia MD

## 2022-11-09 ENCOUNTER — OFFICE VISIT (OUTPATIENT)
Dept: UROLOGY | Age: 82
End: 2022-11-09
Payer: MEDICARE

## 2022-11-09 ENCOUNTER — TELEPHONE (OUTPATIENT)
Dept: UROLOGY | Age: 82
End: 2022-11-09

## 2022-11-09 DIAGNOSIS — N39.0 RECURRENT UTI: ICD-10-CM

## 2022-11-09 DIAGNOSIS — N32.81 OAB (OVERACTIVE BLADDER): ICD-10-CM

## 2022-11-09 DIAGNOSIS — N13.8 BPH WITH OBSTRUCTION/LOWER URINARY TRACT SYMPTOMS: Primary | ICD-10-CM

## 2022-11-09 DIAGNOSIS — C61 MALIGNANT NEOPLASM OF PROSTATE (HCC): ICD-10-CM

## 2022-11-09 DIAGNOSIS — N40.1 BPH WITH OBSTRUCTION/LOWER URINARY TRACT SYMPTOMS: Primary | ICD-10-CM

## 2022-11-09 LAB
BILIRUBIN, URINE, POC: NEGATIVE
BLOOD URINE, POC: NORMAL
GLUCOSE URINE, POC: NEGATIVE
KETONES, URINE, POC: NEGATIVE
LEUKOCYTE ESTERASE, URINE, POC: NORMAL
NITRITE, URINE, POC: POSITIVE
PH, URINE, POC: 5.5 (ref 4.6–8)
PROTEIN,URINE, POC: 30
SPECIFIC GRAVITY, URINE, POC: 1.02 (ref 1–1.03)
URINALYSIS CLARITY, POC: NORMAL
URINALYSIS COLOR, POC: NORMAL
UROBILINOGEN, POC: NORMAL

## 2022-11-09 PROCEDURE — 1123F ACP DISCUSS/DSCN MKR DOCD: CPT | Performed by: NURSE PRACTITIONER

## 2022-11-09 PROCEDURE — G8427 DOCREV CUR MEDS BY ELIG CLIN: HCPCS | Performed by: NURSE PRACTITIONER

## 2022-11-09 PROCEDURE — 81003 URINALYSIS AUTO W/O SCOPE: CPT | Performed by: NURSE PRACTITIONER

## 2022-11-09 PROCEDURE — 1036F TOBACCO NON-USER: CPT | Performed by: NURSE PRACTITIONER

## 2022-11-09 PROCEDURE — G8484 FLU IMMUNIZE NO ADMIN: HCPCS | Performed by: NURSE PRACTITIONER

## 2022-11-09 PROCEDURE — 99214 OFFICE O/P EST MOD 30 MIN: CPT | Performed by: NURSE PRACTITIONER

## 2022-11-09 PROCEDURE — G8420 CALC BMI NORM PARAMETERS: HCPCS | Performed by: NURSE PRACTITIONER

## 2022-11-09 RX ORDER — CEPHALEXIN 500 MG/1
500 CAPSULE ORAL 3 TIMES DAILY
Qty: 21 CAPSULE | Refills: 0 | Status: SHIPPED | OUTPATIENT
Start: 2022-11-09 | End: 2022-11-16

## 2022-11-09 ASSESSMENT — ENCOUNTER SYMPTOMS
ABDOMINAL PAIN: 0
BACK PAIN: 0

## 2022-11-09 NOTE — PROGRESS NOTES
[facility]  [address]  [place] [address], [address] [facility]  [phone]          Ernst Florian  : 1940    Chief Complaint   Patient presents with    Follow-up     OAB/BPH with LUTS/Prostate cancer          HPI     Ernst Florian is a 79 y.o. male prev followed by Dr Lynne Luis. H/O prostate CA. Underwent radiation tx and hormone deprivation in . PSA  remains UND. H/O previous TURP. H/O renal cysts. CT 2018 showed stable benign renal cysts. No recent imaging. sCr 1.30 ()     Had UTI earlier this month. Urine culture 22 grew E Coli. Completed antibiotic for this. No UTI sx today. He has a long h/o urinary frequency and urgency. He reports nocturia x 2-3/night. NO weak stream. He was previously on Flomax but had to stop this d/t SE. He has also tried Vesicare, oxybutynin, and myrbetriq. PVR 0 cc via US. He has been on trial of Gemtesa. Reports some improvement. He unfortunately developed recurrent of UTI. E Coli UTI  and 10/22. Having UTI sx today. No fever/chills, n/v, flank pain, or gross hematuria. No h/o renal stones. Patient also has h/o Parksinson's disease.        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    Sleep apnea      Past Surgical History:   Procedure Laterality Date    CARDIAC CATHETERIZATION  2020    CATARACT REMOVAL Bilateral     IOL    COLONOSCOPY      poplyp removed    CYST REMOVAL Left     shoulder    OTHER SURGICAL HISTORY      prostate radiation    [place] APPENDECTOMY      TURP       Current Outpatient Medications   Medication Sig Dispense Refill    cephALEXin (KEFLEX) 500 MG capsule Take 1 capsule by mouth 3 times daily for 7 days 21 capsule 0    famotidine (PEPCID) 20 MG tablet TAKE 1 TABLET BY MOUTH NIGHTLY AS NEEDED (NAUSEA) 90 tablet 1    Vibegron (GEMTESA) 75 MG TABS Take 75 mg by mouth daily 30 tablet 0    Multiple Vitamin (MULTIVITAMIN ADULT PO) Take 1 tablet by mouth daily      aspirin 81 MG EC tablet Take 81 mg by mouth daily      carbidopa-levodopa (SINEMET)  MG per tablet Take 2 tablets by mouth 3 times daily      cyanocobalamin 1000 MCG/ML injection INJECT 1ML INTRAMUSCULARLY ONCE WEEKLY FOR 4 WEEKS THEN START MONTHLY      Melatonin 5 MG CAPS Take by mouth at bedtime       No current facility-administered medications for this visit.      Allergies   Allergen Reactions    Tamsulosin Other (See Comments)    Adhesive Tape Rash     Social History     Socioeconomic History    Marital status:      Spouse name: Not on file    Number of children: Not on file    Years of education: Not on file    Highest education level: Not on file   Occupational History    Not on file   Tobacco Use    Smoking status: Former     Types: Cigarettes     Quit date: 1960     Years since quittin.8    Smokeless tobacco: Never   Substance and Sexual Activity    Alcohol use: No    Drug use: Never    Sexual activity: Not on file   Other Topics Concern    Not on file   Social History Narrative    Drinks 1 cup of caffeine per day     Social Determinants of Health     Financial Resource Strain: Low Risk     Difficulty of Paying Living Expenses: Not hard at all   Food Insecurity: No Food Insecurity    Worried About Running Out of Food in the Last Year: Never true    Ran Out of Food in the Last Year: Never true   Transportation Needs: Not on file   Physical Activity: Inactive    Days of Exercise per Week: 0 days    Minutes of Exercise per Session: 0 min   Stress: Not on file   Social Connections: Not on file   Intimate Partner Violence: Not on file   Housing Stability: Not on file     Family History   Problem Relation Age of Onset    Stroke Father     Cancer Father     Stroke Mother     Pulmonary Embolism Brother     Heart Disease Mother     Heart Disease Father        Review of Systems  Constitutional:   Negative for fever. GI:  Negative for abdominal pain. Genitourinary: Positive for urinary burning. Musculoskeletal:  Negative for back pain. Urinalysis  UA - Dipstick  Results for orders placed or performed in visit on 11/09/22   AMB POC URINALYSIS DIP STICK AUTO W/O MICRO   Result Value Ref Range    Color (UA POC)      Clarity (UA POC)      Glucose, Urine, POC Negative Negative    Bilirubin, Urine, POC Negative Negative    KETONES, Urine, POC Negative Negative    Specific Gravity, Urine, POC 1.020 1.001 - 1.035    Blood (UA POC) Moderate Negative    pH, Urine, POC 5.5 4.6 - 8.0    Protein, Urine, POC 30 Negative    Urobilinogen, POC 0.2 mg/dL     Nitrite, Urine, POC Positive Negative    Leukocyte Esterase, Urine, POC Moderate Negative   Results for orders placed or performed in visit on 08/08/22   AMB POC URINALYSIS DIP STICK AUTO W/O MICRO   Result Value Ref Range    Color, Urine, POC yellow     Clarity, Urine, POC cloudy     Glucose, Urine, POC Negative Negative    Bilirubin, Urine, POC 2+ Negative    Ketones, Urine, POC Negative Negative    Specific Gravity, Urine, POC 1.020 1.001 - 1.035    Blood, Urine, POC small Negative    pH, Urine, POC 6.0 4.6 - 8.0    Protein, Urine, POC Negative Negative    Urobilinogen, POC 0.2     Nitrate, Urine, POC Positive Negative    Leukocyte Esterase, Urine, POC 2+ Negative       PHYSICAL EXAM    General appearance - well appearing and in no distress  Mental status - alert, oriented to person, place, and time  Neck - supple, no significant adenopathy  Chest/Lung-  Quiet, even and easy respiratory effort without use of accessory muscles  Skin - normal coloration and turgor, no rashes      Assessment and Plan    ICD-10-CM    1.  BPH with obstruction/lower urinary tract symptoms  N40.1 AMB POC URINALYSIS DIP STICK AUTO W/O MICRO    N13.8 US RETROPERITONEAL COMPLETE      2. OAB (overactive bladder)  N32.81 AMB POC URINALYSIS DIP STICK AUTO W/O MICRO     US RETROPERITONEAL COMPLETE      3. Malignant neoplasm of prostate (HCC)  C61 AMB POC URINALYSIS DIP STICK AUTO W/O MICRO     US RETROPERITONEAL COMPLETE      4. Recurrent UTI  N39.0 US RETROPERITONEAL COMPLETE     Culture, Urine          BPH/LUTS/OAB- PVR low. Improved w Learta Lambing. Cont this. Prostate CA- PSA UND. Cont to follow. Recurrent UTI- urine infected. Culture pending. Start Keflex 500 mg PO TID x 7 days. Patient will begin daily suppressive antibiotic. Potential side effects were discussed. The plan will be to hopefully be able to wean this in 3-4 months to 3 times weekly and eventually stop it altogether. Renal u/s has been ordered to ensure no large stone as a source of UTI's. Cysto in office to follow up on recurrent UTI recommended. Risks, benefits, and alternatives reviewed. He agrees to this. Advised to call sooner if needed. Lloyd Sanchez, TGP, APRN - CNP  Dr. Maryana Lamb is supervising physician today and he approves plan of care.

## 2022-11-14 LAB
BACTERIA SPEC CULT: ABNORMAL
SERVICE CMNT-IMP: ABNORMAL

## 2022-11-14 RX ORDER — CEPHALEXIN 250 MG/1
250 CAPSULE ORAL DAILY
Qty: 90 CAPSULE | Refills: 0 | Status: SHIPPED | OUTPATIENT
Start: 2022-11-14

## 2022-11-28 ENCOUNTER — PROCEDURE VISIT (OUTPATIENT)
Dept: UROLOGY | Age: 82
End: 2022-11-28
Payer: MEDICARE

## 2022-11-28 DIAGNOSIS — C61 PROSTATE CANCER (HCC): ICD-10-CM

## 2022-11-28 DIAGNOSIS — N32.81 OAB (OVERACTIVE BLADDER): Primary | ICD-10-CM

## 2022-11-28 LAB
BILIRUBIN, URINE, POC: NEGATIVE
BLOOD URINE, POC: NORMAL
GLUCOSE URINE, POC: NEGATIVE
KETONES, URINE, POC: NORMAL
LEUKOCYTE ESTERASE, URINE, POC: NEGATIVE
NITRITE, URINE, POC: NEGATIVE
PH, URINE, POC: 5.5 (ref 4.6–8)
PROTEIN,URINE, POC: NEGATIVE
SPECIFIC GRAVITY, URINE, POC: 1.02 (ref 1–1.03)
URINALYSIS CLARITY, POC: NORMAL
URINALYSIS COLOR, POC: NORMAL
UROBILINOGEN, POC: NORMAL

## 2022-11-28 PROCEDURE — 81003 URINALYSIS AUTO W/O SCOPE: CPT | Performed by: UROLOGY

## 2022-11-28 PROCEDURE — 52000 CYSTOURETHROSCOPY: CPT | Performed by: UROLOGY

## 2022-11-28 NOTE — PROGRESS NOTES
[facility]  [address]   [address]  [facility], [address] [facility]  [phone]    Gwyn Singleton  : 1940         HPI   79 y.o., male presents for cystoscopy. Pt diagnosed with several E coli UTI's in past several mo. Prior EBRT in  for CaP as well as TURP prior to EBRT. Cont to report mod freq/urg. Parkinsons. Tried Flomax, Vesicare, Myrbetriq and Ditropan with SE or lack of efficacy. Remains on Gemtesa and Keflex suppression. PVR was 0cc at last visit. BRETT scheduled for 22. PSA was <0.1 on 22.       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    Sleep apnea      Past Surgical History:   Procedure Laterality Date    CARDIAC CATHETERIZATION  2020    CATARACT REMOVAL Bilateral     IOL    COLONOSCOPY      poplyp removed    CYST REMOVAL Left     shoulder    OTHER SURGICAL HISTORY      prostate radiation    [place] APPENDECTOMY      TURP       Current Outpatient Medications   Medication Sig Dispense Refill    cephALEXin (KEFLEX) 250 MG capsule Take 1 capsule by mouth daily 90 capsule 0    famotidine (PEPCID) 20 MG tablet TAKE 1 TABLET BY MOUTH NIGHTLY AS NEEDED (NAUSEA) 90 tablet 1    Vibegron (GEMTESA) 75 MG TABS Take 75 mg by mouth daily 30 tablet 0    Multiple Vitamin (MULTIVITAMIN ADULT PO) Take 1 tablet by mouth daily      aspirin 81 MG EC tablet Take 81 mg by mouth daily      carbidopa-levodopa (SINEMET)  MG per tablet Take 2 tablets by mouth 3 times daily      cyanocobalamin 1000 MCG/ML injection INJECT 1ML INTRAMUSCULARLY ONCE WEEKLY FOR 4 WEEKS THEN START MONTHLY      Melatonin 5 MG CAPS Take by mouth at bedtime       No current facility-administered medications for this visit. Allergies   Allergen Reactions    Tamsulosin Other (See Comments)    Adhesive Tape Rash     Social History     Socioeconomic History    Marital status:      Spouse name: Not on file    Number of children: Not on file    Years of education: Not on file    Highest education level: Not on file   Occupational History    Not on file   Tobacco Use    Smoking status: Former     Types: Cigarettes     Quit date: 1960     Years since quittin.9    Smokeless tobacco: Never   Substance and Sexual Activity    Alcohol use: No    Drug use: Never    Sexual activity: Not on file   Other Topics Concern    Not on file   Social History Narrative    Drinks 1 cup of caffeine per day     Social Determinants of Health     Financial Resource Strain: Low Risk     Difficulty of Paying Living Expenses: Not hard at all   Food Insecurity: No Food Insecurity    Worried About Running Out of Food in the Last Year: Never true    Ran Out of Food in the Last Year: Never true   Transportation Needs: Not on file   Physical Activity: Inactive    Days of Exercise per Week: 0 days    Minutes of Exercise per Session: 0 min   Stress: Not on file   Social Connections: Not on file   Intimate Partner Violence: Not on file   Housing Stability: Not on file     Family History   Problem Relation Age of Onset    Stroke Father     Cancer Father     Stroke Mother     Pulmonary Embolism Brother     Heart Disease Mother     Heart Disease Father        There were no vitals taken for this visit.     UA - Dipstick  Results for orders placed or performed in visit on 22   AMB POC URINALYSIS DIP STICK AUTO W/O MICRO   Result Value Ref Range    Color (UA POC)      Clarity (UA POC)      Glucose, Urine, POC Negative Negative    Bilirubin, Urine, POC Negative Negative    KETONES, Urine, POC Trace Negative    Specific Gravity, Urine, POC 1.025 1.001 - 1.035    Blood (UA POC) Trace-lysed Negative    pH, Urine, POC 5.5 4.6 - 8.0    Protein, Urine, POC Negative Negative    Urobilinogen, POC 0.2 mg/dL     Nitrite, Urine, POC Negative Negative    Leukocyte Esterase, Urine, POC Negative Negative   Results for orders placed or performed in visit on 08/08/22   AMB POC URINALYSIS DIP STICK AUTO W/O MICRO   Result Value Ref Range    Color, Urine, POC yellow     Clarity, Urine, POC cloudy     Glucose, Urine, POC Negative Negative    Bilirubin, Urine, POC 2+ Negative    Ketones, Urine, POC Negative Negative    Specific Gravity, Urine, POC 1.020 1.001 - 1.035    Blood, Urine, POC small Negative    pH, Urine, POC 6.0 4.6 - 8.0    Protein, Urine, POC Negative Negative    Urobilinogen, POC 0.2     Nitrate, Urine, POC Positive Negative    Leukocyte Esterase, Urine, POC 2+ Negative       UA - Micro  WBC - 0  RBC - 0  Bacteria - 0  Epith - 0          Cystoscopy Procedure:     Procedure Room # 3  Scope ID: [id number]  Assistant: RAMO      All risks, benefits and alternatives were again reviewed with patient and he is willing to proceed at this time. His genital area was prepped and draped and a sterile field applied. 2% lidocaine jelly was injected in the the urethra and allowed to dwell for several minutes. A flexible cystoscope was then inserted into the urethral meatus and advanced under direct vision. The anterior and posterior urethra appeared normal in appearance. The prostatic urethra was open with mild apical regrowth. The bladder was systematically surveyed. No bladder trabeculations were seen. Mod radiation cystitis. The ureteral orifices were seen in their normal orthotopic position. The cystoscope was then removed under direct vision. The patient tolerated the procedure well. Assessment and Plan    ICD-10-CM    1. OAB (overactive bladder)  N32.81 CYSTOURETHROSCOPY     AMB POC URINALYSIS DIP STICK AUTO W/O MICRO      2. Prostate cancer ([facility] [address]  C61         Cont Gemtesa.   RTO in 2 wks with HUMBLE Delgado, DO

## 2022-11-28 NOTE — LETTER
[facility] UROLOGY  [address] [facility] [address]  Phone: [phone]  Fax: [phone]    Haig Shone, DO    November 28, 2022     Wendy Pete, [address] [place] [address]    Patient: Bayron Watkins   MR Number: [id number]   YOB: 1940   Date of Visit: 11/28/2022       Dear Wendy Pete: Thank you for referring Guille Barney to me for evaluation/treatment. Below are the relevant portions of my assessment and plan of care. If you have questions, please do not hesitate to call me. I look forward to following Prince urbina along with you.     Sincerely,      BRITT LAUGHLIN, DO
[phone]

## 2022-11-30 RX ORDER — NYSTATIN 100000 U/G
OINTMENT TOPICAL
Qty: 30 G | Refills: 3 | Status: SHIPPED | OUTPATIENT
Start: 2022-11-30

## 2022-12-02 DIAGNOSIS — G20 PARKINSON'S DISEASE (HCC): Primary | ICD-10-CM

## 2022-12-02 NOTE — TELEPHONE ENCOUNTER
Patients daughter called for a refill for Rx Carbidopa-Levodopa  mg per tab for her dad. He only has 4 tablets left. Patient needs follow-up appt. Rx pended.  Please advise

## 2022-12-07 ENCOUNTER — HOSPITAL ENCOUNTER (OUTPATIENT)
Dept: ULTRASOUND IMAGING | Age: 82
Discharge: HOME | End: 2022-12-10
Payer: MEDICARE

## 2022-12-07 DIAGNOSIS — N39.0 RECURRENT UTI: ICD-10-CM

## 2022-12-07 DIAGNOSIS — C61 MALIGNANT NEOPLASM OF PROSTATE (HCC): ICD-10-CM

## 2022-12-07 DIAGNOSIS — N40.1 BPH WITH OBSTRUCTION/LOWER URINARY TRACT SYMPTOMS: ICD-10-CM

## 2022-12-07 DIAGNOSIS — N13.8 BPH WITH OBSTRUCTION/LOWER URINARY TRACT SYMPTOMS: ICD-10-CM

## 2022-12-07 DIAGNOSIS — N32.81 OAB (OVERACTIVE BLADDER): ICD-10-CM

## 2022-12-07 PROCEDURE — 76770 US EXAM ABDO BACK WALL COMP: CPT

## 2022-12-08 NOTE — RESULT ENCOUNTER NOTE
SUSAN on RA with about 11 minutes with saturations < 89%. Desaturations to 70% noted but this was very transient with most sats > 85%. Since he tolerates CPAP poorly and sleeps much better off CPAP, he can stay off CPAP. He needs ongoing follow up in the sleep center to assure stability. Please let him know.     Freddy Ordoñez MD

## 2022-12-09 ENCOUNTER — TELEPHONE (OUTPATIENT)
Dept: SLEEP MEDICINE | Age: 82
End: 2022-12-09

## 2022-12-09 NOTE — TELEPHONE ENCOUNTER
Tried to call pt. Voicemail is not set up ----- Message from Terry Hanna MD sent at 12/8/2022 12:46 PM EST -----  SUSAN on RA with about 11 minutes with saturations < 89%. Desaturations to 70% noted but this was very transient with most sats > 85%. Since he tolerates CPAP poorly and sleeps much better off CPAP, he can stay off CPAP. He needs ongoing follow up in the sleep center to assure stability. Please let him know.     Rafael Moore MD

## 2022-12-15 ENCOUNTER — OFFICE VISIT (OUTPATIENT)
Dept: UROLOGY | Age: 82
End: 2022-12-15
Payer: MEDICARE

## 2022-12-15 DIAGNOSIS — N40.1 BPH WITH OBSTRUCTION/LOWER URINARY TRACT SYMPTOMS: Primary | ICD-10-CM

## 2022-12-15 DIAGNOSIS — C61 PROSTATE CANCER (HCC): ICD-10-CM

## 2022-12-15 DIAGNOSIS — N32.81 OAB (OVERACTIVE BLADDER): ICD-10-CM

## 2022-12-15 DIAGNOSIS — N13.8 BPH WITH OBSTRUCTION/LOWER URINARY TRACT SYMPTOMS: Primary | ICD-10-CM

## 2022-12-15 DIAGNOSIS — N39.0 RECURRENT UTI: ICD-10-CM

## 2022-12-15 LAB
BILIRUBIN, URINE, POC: NEGATIVE
BLOOD URINE, POC: NORMAL
GLUCOSE URINE, POC: NEGATIVE
KETONES, URINE, POC: NEGATIVE
LEUKOCYTE ESTERASE, URINE, POC: NEGATIVE
NITRITE, URINE, POC: NEGATIVE
PH, URINE, POC: 5.5 (ref 4.6–8)
PROTEIN,URINE, POC: NEGATIVE
SPECIFIC GRAVITY, URINE, POC: 1.02 (ref 1–1.03)
URINALYSIS CLARITY, POC: NORMAL
URINALYSIS COLOR, POC: NORMAL
UROBILINOGEN, POC: NORMAL

## 2022-12-15 PROCEDURE — G8484 FLU IMMUNIZE NO ADMIN: HCPCS | Performed by: NURSE PRACTITIONER

## 2022-12-15 PROCEDURE — 1036F TOBACCO NON-USER: CPT | Performed by: NURSE PRACTITIONER

## 2022-12-15 PROCEDURE — 1123F ACP DISCUSS/DSCN MKR DOCD: CPT | Performed by: NURSE PRACTITIONER

## 2022-12-15 PROCEDURE — 81003 URINALYSIS AUTO W/O SCOPE: CPT | Performed by: NURSE PRACTITIONER

## 2022-12-15 PROCEDURE — G8420 CALC BMI NORM PARAMETERS: HCPCS | Performed by: NURSE PRACTITIONER

## 2022-12-15 PROCEDURE — G8427 DOCREV CUR MEDS BY ELIG CLIN: HCPCS | Performed by: NURSE PRACTITIONER

## 2022-12-15 PROCEDURE — 99214 OFFICE O/P EST MOD 30 MIN: CPT | Performed by: NURSE PRACTITIONER

## 2022-12-15 RX ORDER — CEPHALEXIN 250 MG/1
250 CAPSULE ORAL DAILY
Qty: 90 CAPSULE | Refills: 0 | Status: SHIPPED | OUTPATIENT
Start: 2022-12-15

## 2022-12-15 ASSESSMENT — ENCOUNTER SYMPTOMS
BACK PAIN: 0
ABDOMINAL PAIN: 0

## 2022-12-15 NOTE — PROGRESS NOTES
[facility] Urology  [address] [facility] [address], [address] [facility]  [phone]          Michale Collet  : 1940    Chief Complaint   Patient presents with    Follow-up     2 weeks-BPH with LUTS            HPI     Michale Collet is a 79 y.o. male prev followed by Dr Chitra Zhao. H/O prostate CA. Underwent radiation tx and hormone deprivation in . PSA  remains UND. H/O previous TURP. H/O renal cysts. CT  showed stable benign renal cysts. No recent imaging. sCr 1.30 ()     Had UTI earlier this month. Urine culture 22 grew E Coli. Completed antibiotic for this. No UTI sx today. He has a long h/o urinary frequency and urgency. He reports nocturia x 2-3/night. NO weak stream. He was previously on Flomax but had to stop this d/t SE. He has also tried Vesicare, oxybutynin, and myrbetriq. PVR 0 cc via US. He has been on trial of Gemtesa. Reports some improvement. He unfortunately developed recurrent of UTI. E Coli UTI  and 10/22. Having UTI sx today. No fever/chills, n/v, flank pain, or gross hematuria. No h/o renal stones. had in office cysto  w Dr Valentina Rosario. This showed radiation cystitis. No other findings. BRETT 22 showed B renal cysts but no other findings. Patient also has h/o Parksinson's disease. Back today for follow up. Doing well on Gemtesa and Keflex.        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    Sleep apnea      Past Surgical History:   Procedure Laterality Date    CARDIAC CATHETERIZATION  2020    CATARACT REMOVAL Bilateral     IOL    COLONOSCOPY      poplyp removed    CYST REMOVAL Left     shoulder OTHER SURGICAL HISTORY      prostate radiation    AL APPENDECTOMY      TURP       Current Outpatient Medications   Medication Sig Dispense Refill    cephALEXin (KEFLEX) 250 MG capsule Take 1 capsule by mouth daily 90 capsule 0    carbidopa-levodopa (SINEMET)  MG per tablet Take 2 tablets by mouth 3 times daily 540 tablet 3    nystatin (MYCOSTATIN) 400753 UNIT/GM ointment Apply topically 2 times daily. 30 g 3    famotidine (PEPCID) 20 MG tablet TAKE 1 TABLET BY MOUTH NIGHTLY AS NEEDED (NAUSEA) 90 tablet 1    Vibegron (GEMTESA) 75 MG TABS Take 75 mg by mouth daily 30 tablet 0    Multiple Vitamin (MULTIVITAMIN ADULT PO) Take 1 tablet by mouth daily      aspirin 81 MG EC tablet Take 81 mg by mouth daily      cyanocobalamin 1000 MCG/ML injection INJECT 1ML INTRAMUSCULARLY ONCE WEEKLY FOR 4 WEEKS THEN START MONTHLY      Melatonin 5 MG CAPS Take by mouth at bedtime       No current facility-administered medications for this visit.      Allergies   Allergen Reactions    Tamsulosin Other (See Comments)    Adhesive Tape Rash     Social History     Socioeconomic History    Marital status:      Spouse name: Not on file    Number of children: Not on file    Years of education: Not on file    Highest education level: Not on file   Occupational History    Not on file   Tobacco Use    Smoking status: Former     Types: Cigarettes     Quit date: 1960     Years since quittin.9    Smokeless tobacco: Never   Substance and Sexual Activity    Alcohol use: No    Drug use: Never    Sexual activity: Not on file   Other Topics Concern    Not on file   Social History Narrative    Drinks 1 cup of caffeine per day     Social Determinants of Health     Financial Resource Strain: Low Risk     Difficulty of Paying Living Expenses: Not hard at all   Food Insecurity: No Food Insecurity    Worried About Running Out of Food in the Last Year: Never true    [address] in the Last Year: Never true   Transportation Needs: Not on file   Physical Activity: Inactive    Days of Exercise per Week: 0 days    Minutes of Exercise per Session: 0 min   Stress: Not on file   Social Connections: Not on file   Intimate Partner Violence: Not on file   Housing Stability: Not on file     Family History   Problem Relation Age of Onset    Stroke Father     Cancer Father     Stroke Mother     Pulmonary Embolism Brother     Heart Disease Mother     Heart Disease Father        Review of Systems  Constitutional:   Negative for fever. GI:  Negative for abdominal pain. Musculoskeletal:  Negative for back pain.     Urinalysis  UA - Dipstick  Results for orders placed or performed in visit on 12/15/22   AMB POC URINALYSIS DIP STICK AUTO W/O MICRO   Result Value Ref Range    Color (UA POC)      Clarity (UA POC)      Glucose, Urine, POC Negative Negative    Bilirubin, Urine, POC Negative Negative    KETONES, Urine, POC Negative Negative    Specific Gravity, Urine, POC 1.020 1.001 - 1.035    Blood (UA POC) Trace Negative    pH, Urine, POC 5.5 4.6 - 8.0    Protein, Urine, POC Negative Negative    Urobilinogen, POC 0.2 mg/dL     Nitrite, Urine, POC Negative Negative    Leukocyte Esterase, Urine, POC Negative Negative   Results for orders placed or performed in visit on 08/08/22   AMB POC URINALYSIS DIP STICK AUTO W/O MICRO   Result Value Ref Range    Color, Urine, POC yellow     Clarity, Urine, POC cloudy     Glucose, Urine, POC Negative Negative    Bilirubin, Urine, POC 2+ Negative    Ketones, Urine, POC Negative Negative    Specific Gravity, Urine, POC 1.020 1.001 - 1.035    Blood, Urine, POC small Negative    pH, Urine, POC 6.0 4.6 - 8.0    Protein, Urine, POC Negative Negative    Urobilinogen, POC 0.2     Nitrate, Urine, POC Positive Negative    Leukocyte Esterase, Urine, POC 2+ Negative       UA - Micro  WBC - 0  RBC - 0  Bacteria - 0  Epith - 0    PHYSICAL EXAM    General appearance - well appearing and in no distress  Mental status - alert, oriented to person, place, and time  Neck - supple, no significant adenopathy  Chest/Lung-  Quiet, even and easy respiratory effort without use of accessory muscles  Skin - normal coloration and turgor, no rashes      Assessment and Plan    ICD-10-CM    1. BPH with obstruction/lower urinary tract symptoms  N40.1 AMB POC URINALYSIS DIP STICK AUTO W/O MICRO    N13.8       2. Prostate cancer (HCC)  C61 AMB POC URINALYSIS DIP STICK AUTO W/O MICRO      3. Recurrent UTI  N39.0 AMB POC URINALYSIS DIP STICK AUTO W/O MICRO      4. OAB (overactive bladder)  N32.81           BPH/LUTS/Prostate CA- PSA UND. Cysto showed radiation cystitis. Recurrent UTI- urine normal. Cont Keflex 250 mg PO daily. Will wean off in the near future. OAB- improved. Cont Gemtesa 75 mg PO daily. Tabatha Park, FNP, APRN - CNP  Dr. Loida Benson is supervising physician today and he approves plan of care.

## 2022-12-21 ENCOUNTER — TELEMEDICINE (OUTPATIENT)
Dept: NEUROLOGY | Age: 82
End: 2022-12-21
Payer: MEDICARE

## 2022-12-21 DIAGNOSIS — G20 PARKINSON'S DISEASE (HCC): Primary | ICD-10-CM

## 2022-12-21 DIAGNOSIS — G62.9 PERIPHERAL POLYNEUROPATHY: ICD-10-CM

## 2022-12-21 DIAGNOSIS — R26.89 ABNORMALITY OF GAIT DUE TO IMPAIRMENT OF BALANCE: ICD-10-CM

## 2022-12-21 PROCEDURE — 99443 PR PHYS/QHP TELEPHONE EVALUATION 21-30 MIN: CPT | Performed by: PSYCHIATRY & NEUROLOGY

## 2022-12-21 NOTE — PROGRESS NOTES
[facility] Neurology Telephone Encounter  [address], [address] [facility]  Phone: [phone] Fax [phone]  Provider: Grace Adhikari MD    Patient: Allan Ojeda  Date: 2/12/2021    Telephone Encounter     Allan Ojeda is a 79 y.o. male who was evaluated by telephone. Patient was offered an encounter using audio-video technology either via [address] or [email or web address]. me but declined. Consent:  He and/or his healthcare decision maker is aware that this patient-initiated Telehealth encounter is a billable service, with coverage as determined by his insurance carrier. He is aware that he may receive a bill and has provided verbal consent to proceed: Yes    I was in the office while conducting this encounter. Patient Location: Patient Home    Subjective     No chief complaint on file. Allan Ojeda is a 79 y.o. male who presents for follow up of Parkinson's disease. He is accompanied by his daughter. He was last evaluated by telephone on February 2021. Patient presents for follow-up and continued management of Parkinson's disease complicated by tremor of the R>L hands, micrographia, gait disturbance, and chronic constipation. He also has a length dependent generalized polyneuropathy confirmed on nerve conduction studies. Current medications include:  Sinemet  mg 2 tablets 3 times a day    Previous medication trials include: N/A    He presents today for follow-up. It has been sometime since I have been able to do any formal examination on this gentleman in the office. Again we are having a follow-up by telephone. He does report that he is doing relatively well. He continues to take Sinemet as noted above and feels that it has provided benefit for his tremor and he endorses very little wearing off in between doses and no other adverse effects or dyskinesias.     There has been a noticeable gait disturbance which had previously required the assistance of a cane as needed but he reports being able to ambulate at home without it. He denies any falls. I do not have any other corroborating history from his family. There is a clear loss of speech volume but he is intelligible even over the phone. He denies any significant cognitive symptoms or neuropsychiatric disturbances. No RBD. He does wear a BiPAP at night for severe obstructive sleep apnea. Nerve conduction studies have been reviewed with him which do show evidence of a generalized polyneuropathy. He denies any neuropathic pain at the current time. He is also had an MRI of the brain which is reviewed below but in summary shows moderate cerebral volume loss and small vessel ischemic changes. Past Medical History:     Past medical history, surgical history, social history, family history, medications, and allergies were reviewed and updated as appropriate.      PAST MEDICAL HISTORY:  Past Medical History:   Diagnosis Date    Arthritis     OA generalized    Buerger's disease ([facility]     left leg blood clot     CAD (coronary artery disease)     Decreased mobility     Gout     Hematuria, microscopic     Hiatal hernia     Personal history of prostate cancer     Prostate cancer ([facility]     2000    Radiation cystitis 9/1/2020    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    Sleep apnea      PAST SURGICAL HISTORY:   Past Surgical History:   Procedure Laterality Date    CARDIAC CATHETERIZATION  08/03/2020    CATARACT REMOVAL Bilateral     IOL    COLONOSCOPY      poplyp removed    CYST REMOVAL Left     shoulder    OTHER SURGICAL HISTORY      prostate radiation    WI APPENDECTOMY      TURP       FAMILY HISTORY:  Family History   Problem Relation Age of Onset    Stroke Father     Cancer Father     Stroke Mother     Pulmonary Embolism Brother     Heart Disease Mother     Heart Disease Father      SOCIAL HISTORY:  Social History     Socioeconomic History    Marital status:    Tobacco Use    Smoking status: Former     Types: Cigarettes     Quit date: 1960     Years since quittin.0    Smokeless tobacco: Never   Substance and Sexual Activity    Alcohol use: No    Drug use: Never   Social History Narrative    Drinks 1 cup of caffeine per day     Social Determinants of Health     Financial Resource Strain: Low Risk     Difficulty of Paying Living Expenses: Not hard at all   Food Insecurity: No Food Insecurity    Worried About Running Out of Food in the Last Year: Never true    Ran Out of Food in the Last Year: Never true   Physical Activity: Inactive    Days of Exercise per Week: 0 days    Minutes of Exercise per Session: 0 min       Medications/Allergies:     MEDICATIONS:   Current Outpatient Medications on File Prior to Visit   Medication Sig Dispense Refill    cephALEXin (KEFLEX) 250 MG capsule Take 1 capsule by mouth daily 90 capsule 0    carbidopa-levodopa (SINEMET)  MG per tablet Take 2 tablets by mouth 3 times daily 540 tablet 3    nystatin (MYCOSTATIN) 715238 UNIT/GM ointment Apply topically 2 times daily. 30 g 3    famotidine (PEPCID) 20 MG tablet TAKE 1 TABLET BY MOUTH NIGHTLY AS NEEDED (NAUSEA) 90 tablet 1    Vibegron (GEMTESA) 75 MG TABS Take 75 mg by mouth daily 30 tablet 0    Multiple Vitamin (MULTIVITAMIN ADULT PO) Take 1 tablet by mouth daily      aspirin 81 MG EC tablet Take 81 mg by mouth daily      cyanocobalamin 1000 MCG/ML injection INJECT 1ML INTRAMUSCULARLY ONCE WEEKLY FOR 4 WEEKS THEN START MONTHLY      Melatonin 5 MG CAPS Take by mouth at bedtime       No current facility-administered medications on file prior to visit. ALLERGIES:  Allergies   Allergen Reactions    Tamsulosin Other (See Comments)    Adhesive Tape Rash       Lab/Imaging Review:   I REVIEWED PERTINENT LABS, IMAGES, AND REPORTS WITH THE PATIENT PERSONALLY, DIRECTLY AND FULLY.  THE MOST PERTINENT FINDINGS ARE NOTED BELOW:    MRI Brain 2020:  IMPRESSION: Moderate cerebral volume loss and white matter findings compatible vessel ischemic disease. EMG/NCV February 2021:  INTERPRETATION:  THESE FINDINGS ARE ELECTROPHYSIOLOGIC EVIDENCE OF DIFFUSE POLYNEUROPATHY = SENSORY AND MOTOR = MIXED AXONAL-DEMYELINATING without any evidence of myotonia or myopathy. No fasciculations or upper motor neuron sign. CONCLUSION:  Compatible with a generalized polyneuropathy that appears to be axonal and demyelinating sensorimotor. Assessment and Plan:     Bayron Watkins is a 79 y.o. male who presents with the following issues:     Diagnoses and all orders for this visit:    Parkinson's disease ([facility]    Peripheral polyneuropathy    Abnormality of gait due to impairment of balance      Patient presents today for follow-up via telephone so we do not have a neurologic exam.  I have not been able to examine him for well over a year. However he does report that he seems to be doing relatively well. Therefore we will have him continue Sinemet  mg 2 tablets 3 times a day. Counseled on the possibility of increasing the dose as needed but he seems to be doing well at the current time. He has been noted in the past to have a very wide-based gait which is a bit atypical and it is felt that there may be a component of neuropathy and sensory ataxia that may be contributing to his gait and balance. We have discussed the use of assistive devices as needed for fall prevention. He currently denies any falls. He was counseled on the importance of physical activity and aerobic exercise with respect to maintaining functional mobility and delaying disease progression. Continue to monitor for any other worsening cognitive or neuropsychiatric symptoms. I strongly encouraged him to follow-up in the office at his next visit. Follow-up in 6 months. I have personally interviewed Mr. Guille Barney and I have personally reviewed all relevant records including labs and imaging as noted above.  I have written all aspects of this note. More than 50% of this time was used for counseling regarding my diagnosis, prognosis, and plans for management. Total visit time: 26 minutes. 712  We discussed the expected course, resolution and complications of the diagnosis(es) in detail. Medication risks, benefits, costs, interactions, and alternatives were discussed as indicated. I advised him to contact the office if his condition worsens, changes or fails to improve as anticipated. He expressed understanding with the diagnosis(es) and plan.     ________________________________      Pursuant to the emergency declaration under the [address] [place] [address], [facility] waiver authority and the [employer] Act, this Virtual Visit was conducted, with patient's consent, to reduce the patient's risk of exposure to COVID-19 and provide continuity of care for an established patient. Services were provided via telephone to substitute for in-person clinic visit.       Signature: Arina Ayala MD      Date:  2/12/2021    [facility] Neurology   [address] [place] [address]  Ph: [phone]  Fax: [phone]

## 2023-01-18 ENCOUNTER — OFFICE VISIT (OUTPATIENT)
Dept: CARDIOLOGY CLINIC | Age: 83
End: 2023-01-18

## 2023-01-18 VITALS
DIASTOLIC BLOOD PRESSURE: 40 MMHG | WEIGHT: 175 LBS | HEIGHT: 76 IN | SYSTOLIC BLOOD PRESSURE: 90 MMHG | BODY MASS INDEX: 21.31 KG/M2 | HEART RATE: 73 BPM

## 2023-01-18 DIAGNOSIS — I48.0 PAROXYSMAL ATRIAL FIBRILLATION (HCC): ICD-10-CM

## 2023-01-18 DIAGNOSIS — I25.10 CORONARY ARTERY DISEASE INVOLVING NATIVE CORONARY ARTERY OF NATIVE HEART WITHOUT ANGINA PECTORIS: Primary | ICD-10-CM

## 2023-01-18 DIAGNOSIS — G47.33 OBSTRUCTIVE SLEEP APNEA SYNDROME: ICD-10-CM

## 2023-01-18 DIAGNOSIS — N18.31 STAGE 3A CHRONIC KIDNEY DISEASE (HCC): ICD-10-CM

## 2023-01-18 NOTE — PROGRESS NOTES
[address], [address] [employer] [facility], [address] [place] [address]  PHONE: [phone]     23    NAME:  Moira Wolfe  : 1940  MRN: [id number]       SUBJECTIVE:   Moira Wolfe is a 79 y.o. male seen for a follow up visit regarding the following:     Chief Complaint   Patient presents with    Coronary Artery Disease     6 month follow up       HPI:   Here for CAD. AILEEN off BiPAP. 8/10/2020: CABG X 2 with LIMA to LAD and reverse SVG to the OM. Post-op Afib and low BPs. Echo 2021: normal EF. Dx with Parkinson's. No CP, walking ok. No angina, no LE edema. Some palp. No new villegas, sob. Patient denies recent history of orthopnea, PND, excessive dizziness and/or syncope. Here with daughter today. Past Medical History, Past Surgical History, Family history, Social History, and Medications were all reviewed with the patient today and updated as necessary. Current Outpatient Medications   Medication Sig Dispense Refill    cephALEXin (KEFLEX) 250 MG capsule Take 1 capsule by mouth daily 90 capsule 0    carbidopa-levodopa (SINEMET)  MG per tablet Take 2 tablets by mouth 3 times daily 540 tablet 3    nystatin (MYCOSTATIN) 821802 UNIT/GM ointment Apply topically 2 times daily. 30 g 3    famotidine (PEPCID) 20 MG tablet TAKE 1 TABLET BY MOUTH NIGHTLY AS NEEDED (NAUSEA) 90 tablet 1    Vibegron (GEMTESA) 75 MG TABS Take 75 mg by mouth daily 30 tablet 0    Multiple Vitamin (MULTIVITAMIN ADULT PO) Take 1 tablet by mouth daily      aspirin 81 MG EC tablet Take 81 mg by mouth daily      cyanocobalamin 1000 MCG/ML injection 100 mcg every 30 days       No current facility-administered medications for this visit.         Allergies   Allergen Reactions    Tamsulosin Other (See Comments)    Adhesive Tape Rash     Patient Active Problem List    Diagnosis Date Noted    Slow transit constipation 2022     Priority: Medium    Encounter for medication administration 2022     Priority: Medium    Chronic renal disease, stage III [facility]) [[id number]] 2022     Priority: Medium    Parkinson disease ([facility] 2020    RBD (REM behavioral disorder) 2020    Prostate cancer ([facility] 2020    Atrial fibrillation ([facility] 2020    CAD (coronary artery disease) 08/10/2020    Obstructive sleep apnea syndrome 2020    Renal cyst 2018      Past Surgical History:   Procedure Laterality Date    CARDIAC CATHETERIZATION  2020    CATARACT REMOVAL Bilateral     IOL    COLONOSCOPY      poplyp removed    CYST REMOVAL Left     shoulder    OTHER SURGICAL HISTORY      prostate radiation    WI APPENDECTOMY      TURP       Family History   Problem Relation Age of Onset    Stroke Father     Cancer Father     Stroke Mother     Pulmonary Embolism Brother     Heart Disease Mother     Heart Disease Father      Social History     Tobacco Use    Smoking status: Former     Types: Cigarettes     Quit date: 1960     Years since quittin.0    Smokeless tobacco: Never   Substance Use Topics    Alcohol use: No         ROS:    No obvious pertinent positives on review of systems except for what was outlined in the HPI today.       PHYSICAL EXAM:     BP (!) 90/40   Pulse 73   Ht 6' 4\" (1.93 m)   Wt 175 lb (79.4 kg)   BMI 21.30 kg/m²    General/Constitutional:   Alert and oriented x 3, no acute distress  HEENT:   normocephalic, atraumatic, moist mucous membranes  Neck:   No JVD or carotid bruits bilaterally  Cardiovascular:   regular rate and rhythm, no murmur/rub/gallop appreciated  Pulmonary:   clear to auscultation bilaterally, no respiratory distress  Abdomen:   soft, non-tender, non-distended  Ext:   No sig LE edema bilaterally  Skin:  warm and dry, no obvious rashes seen  Neuro:   no obvious sensory or motor deficits  Psychiatric:   normal mood and affect    EKG Today and independently reviewed by me: sinus rhythm, normal intervals and non-specific ST/T wave changes. Lab Results   Component Value Date/Time     11/08/2022 02:49 PM    K 4.4 11/08/2022 02:49 PM     11/08/2022 02:49 PM    CO2 28 11/08/2022 02:49 PM    BUN 38 11/08/2022 02:49 PM    CREATININE 1.30 11/08/2022 02:49 PM    GLUCOSE 108 11/08/2022 02:49 PM    CALCIUM 8.9 11/08/2022 02:49 PM        Lab Results   Component Value Date    WBC 8.9 08/08/2022    HGB 11.4 (L) 08/08/2022    HCT 37.9 (L) 08/08/2022    MCV 96.2 08/08/2022     08/08/2022       Lab Results   Component Value Date    TSH 1.270 10/23/2020       Lab Results   Component Value Date    LABA1C 5.3 02/25/2021     Lab Results   Component Value Date     02/25/2021       Lab Results   Component Value Date    CHOL 155 02/09/2022    CHOL 169 02/25/2021     Lab Results   Component Value Date    TRIG 65 02/09/2022    TRIG 74 02/25/2021     Lab Results   Component Value Date    HDL 58 02/09/2022    HDL 60 02/25/2021     Lab Results   Component Value Date    LDLCALC 84 02/09/2022    [address] 02/25/2021     Lab Results   Component Value Date    VLDL 13 02/09/2022    VLDL 14 02/25/2021     No results found for: CHOLHDLRATIO        I have Independently reviewed prior care notes, any ER records available, cardiac testing, labs and results with the patient and before seeing the patient today. Also independently reviewed outside records when available. ASSESSMENT:    Bebe Moore was seen today for coronary artery disease. Diagnoses and all orders for this visit:    CAD (coronary artery disease)  -     EKG 12 lead    Paroxysmal atrial fibrillation (HCC)    Obstructive sleep apnea syndrome    Stage 3a chronic kidney disease ([facility]        PLAN:      1. CAD:     Remain on ASA 81 daily, he stopped the statin. Follow. The patient has been instructed to call with any angina or equivalent as reviewed today. All questions were answered with the patient voicing complete understanding. 2. AILEEN:  Off  BiPAP.         3. HPL: recommend statin. He does not want it. Worried about dementia. 4 PAF:  Follow for more sx. Seems like fall risk. Weak overall, no AC candidate. CAll PRN for more palp. Follow BP, add med to help as needed. Reviewed conservative management of orthostatic hypotension including adequate hydration, support stockings, liberalization of dietary sodium intake, regular intake of meals. Patient has been instructed and agrees to call our office with any issues or other concerns related to their cardiac condition(s) and/or complaint(s). Return in about 6 months (around 7/18/2023).        EDGAR ZHU, DO  1/18/2023

## 2023-01-24 ENCOUNTER — TELEPHONE (OUTPATIENT)
Dept: SLEEP MEDICINE | Age: 83
End: 2023-01-24

## 2023-01-24 NOTE — TELEPHONE ENCOUNTER
----- Message from [place], [place] sent at 1/24/2023  3:29 PM EST -----  Regarding: FW: Pulse Oximetry Test Result  Can you please read this for me? His dorothy  ----- Message -----  From: Priscila Wilfredjanice  Sent: 1/18/2023   8:39 PM EST  To: , #  Subject: Pulse Oximetry Test Result                       This is Zacarias's daughter Anuj Cardoza. I'm writing to ask about my Dad's pulse oximetry test result from 11/30/22. The test result appears to have been scanned into record, but we aren't able to access it. My Dad's next appt is 3/8/22. Thank You.

## 2023-02-15 ENCOUNTER — OFFICE VISIT (OUTPATIENT)
Dept: UROLOGY | Age: 83
End: 2023-02-15
Payer: MEDICARE

## 2023-02-15 DIAGNOSIS — N40.1 BPH WITH OBSTRUCTION/LOWER URINARY TRACT SYMPTOMS: Primary | ICD-10-CM

## 2023-02-15 DIAGNOSIS — N32.81 OAB (OVERACTIVE BLADDER): ICD-10-CM

## 2023-02-15 DIAGNOSIS — N39.0 RECURRENT UTI: ICD-10-CM

## 2023-02-15 DIAGNOSIS — C61 PROSTATE CANCER (HCC): ICD-10-CM

## 2023-02-15 DIAGNOSIS — N13.8 BPH WITH OBSTRUCTION/LOWER URINARY TRACT SYMPTOMS: Primary | ICD-10-CM

## 2023-02-15 LAB
BILIRUBIN, URINE, POC: NEGATIVE
BLOOD URINE, POC: NORMAL
GLUCOSE URINE, POC: NEGATIVE
KETONES, URINE, POC: NORMAL
LEUKOCYTE ESTERASE, URINE, POC: NEGATIVE
NITRITE, URINE, POC: NEGATIVE
PH, URINE, POC: 5 (ref 4.6–8)
PROTEIN,URINE, POC: NEGATIVE
SPECIFIC GRAVITY, URINE, POC: 1.02 (ref 1–1.03)
URINALYSIS CLARITY, POC: NORMAL
URINALYSIS COLOR, POC: NORMAL
UROBILINOGEN, POC: 0.2

## 2023-02-15 PROCEDURE — 99214 OFFICE O/P EST MOD 30 MIN: CPT | Performed by: NURSE PRACTITIONER

## 2023-02-15 PROCEDURE — 1036F TOBACCO NON-USER: CPT | Performed by: NURSE PRACTITIONER

## 2023-02-15 PROCEDURE — G8484 FLU IMMUNIZE NO ADMIN: HCPCS | Performed by: NURSE PRACTITIONER

## 2023-02-15 PROCEDURE — 81003 URINALYSIS AUTO W/O SCOPE: CPT | Performed by: NURSE PRACTITIONER

## 2023-02-15 PROCEDURE — G8420 CALC BMI NORM PARAMETERS: HCPCS | Performed by: NURSE PRACTITIONER

## 2023-02-15 PROCEDURE — G8427 DOCREV CUR MEDS BY ELIG CLIN: HCPCS | Performed by: NURSE PRACTITIONER

## 2023-02-15 PROCEDURE — 1123F ACP DISCUSS/DSCN MKR DOCD: CPT | Performed by: NURSE PRACTITIONER

## 2023-02-15 NOTE — PROGRESS NOTES
[facility]  9 [facility] [address], [address] [facility]  [phone]          Karoline Alvarenga  : 1940    Chief Complaint   Patient presents with    Benign Prostatic Hypertrophy          HPI     Karoline Alvarenga is a 79 y.o. male prev followed by Dr Araceli Trevizo. H/O prostate CA. Underwent radiation tx and hormone deprivation in . PSA  remains UND. H/O previous TURP. H/O renal cysts. CT 2018 showed stable benign renal cysts. No recent imaging. sCr 1.30 ()     Had UTI earlier this month. Urine culture 22 grew E Coli. Completed antibiotic for this. No UTI sx today. He has a long h/o urinary frequency and urgency. He reports nocturia x 2-3/night. NO weak stream. He was previously on Flomax but had to stop this d/t SE. He has also tried Vesicare, oxybutynin, and myrbetriq. PVR 0 cc via US. He has been on trial of Gemtesa. Reports some improvement. He unfortunately developed recurrent of UTI. E Coli UTI  and 10/22. Having UTI sx today. No fever/chills, n/v, flank pain, or gross hematuria. No h/o renal stones. had in office cysto  w Dr Goran Cain. This showed radiation cystitis. No other findings. BRETT 22 showed B renal cysts but no other findings. Patient also has h/o Parksinson's disease. Back today for follow up. Doing well on Gemtesa and Keflex.            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    Sleep apnea      Past Surgical History:   Procedure Laterality Date    CARDIAC CATHETERIZATION  2020    CATARACT REMOVAL Bilateral     IOL    COLONOSCOPY      poplyp removed    CYST REMOVAL Left     shoulder    OTHER SURGICAL HISTORY      prostate radiation    [place] APPENDECTOMY      TURP       Current Outpatient Medications   Medication Sig Dispense Refill    cephALEXin (KEFLEX) 250 MG capsule Take 1 capsule by mouth daily 90 capsule 0    carbidopa-levodopa (SINEMET)  MG per tablet Take 2 tablets by mouth 3 times daily 540 tablet 3    nystatin (MYCOSTATIN) 857159 UNIT/GM ointment Apply topically 2 times daily. 30 g 3    famotidine (PEPCID) 20 MG tablet TAKE 1 TABLET BY MOUTH NIGHTLY AS NEEDED (NAUSEA) 90 tablet 1    Vibegron (GEMTESA) 75 MG TABS Take 75 mg by mouth daily 30 tablet 0    Multiple Vitamin (MULTIVITAMIN ADULT PO) Take 1 tablet by mouth daily      aspirin 81 MG EC tablet Take 81 mg by mouth daily      cyanocobalamin 1000 MCG/ML injection 100 mcg every 30 days       No current facility-administered medications for this visit.      Allergies   Allergen Reactions    Tamsulosin Other (See Comments)    Adhesive Tape Rash     Social History     Socioeconomic History    Marital status:      Spouse name: Not on file    Number of children: Not on file    Years of education: Not on file    Highest education level: Not on file   Occupational History    Not on file   Tobacco Use    Smoking status: Former     Types: Cigarettes     Quit date: 1960     Years since quittin.1    Smokeless tobacco: Never   Substance and Sexual Activity    Alcohol use: No    Drug use: Never    Sexual activity: Not on file   Other Topics Concern    Not on file   Social History Narrative    Drinks 1 cup of caffeine per day     Social Determinants of Health     Financial Resource Strain: Low Risk     Difficulty of Paying Living Expenses: Not hard at all   Food Insecurity: No Food Insecurity    Worried About Running Out of Food in the Last Year: Never true    [address] in the Last Year: Never true   Transportation Needs: Not on file   Physical Activity: Inactive    Days of Exercise per Week: 0 days    Minutes of Exercise per Session: 0 min   Stress: Not on file   Social Connections: Not on file   Intimate Partner Violence: Not on file   Housing Stability: Not on file     Family History   Problem Relation Age of Onset    Stroke Father     Cancer Father     Stroke Mother     Pulmonary Embolism Brother     Heart Disease Mother     Heart Disease Father        ROS    Urinalysis  UA - Dipstick  Results for orders placed or performed in visit on 02/15/23   AMB POC URINALYSIS DIP STICK AUTO W/O MICRO   Result Value Ref Range    Color, Urine, POC      Clarity, Urine, POC      Glucose, Urine, POC Negative Negative    Bilirubin, Urine, POC Negative Negative    Ketones, Urine, POC Trace Negative    Specific Gravity, Urine, POC 1.020 1.001 - 1.035    Blood, Urine, POC Trace-intact Negative    pH, Urine, POC 5.0 4.6 - 8.0    Protein, Urine, POC Negative Negative    Urobilinogen, POC 0.2     Nitrate, Urine, POC Negative Negative    Leukocyte Esterase, Urine, POC Negative Negative   Results for orders placed or performed in visit on 12/15/22   AMB POC URINALYSIS DIP STICK AUTO W/O MICRO   Result Value Ref Range    Color (UA POC)      Clarity (UA POC)      Glucose, Urine, POC Negative Negative    Bilirubin, Urine, POC Negative Negative    KETONES, Urine, POC Negative Negative    Specific Gravity, Urine, POC 1.020 1.001 - 1.035    Blood (UA POC) Trace Negative    pH, Urine, POC 5.5 4.6 - 8.0    Protein, Urine, POC Negative Negative    Urobilinogen, POC 0.2 mg/dL     Nitrite, Urine, POC Negative Negative    Leukocyte Esterase, Urine, POC Negative Negative       UA - Micro  WBC - 0  RBC - 0  Bacteria - 0  Epith - 0    PHYSICAL EXAM    General appearance - well appearing and in no distress  Mental status - alert, oriented to person, place, and time  Neck - supple, no significant adenopathy  Chest/Lung-  Quiet, even and easy respiratory effort without use of accessory muscles  Skin - normal coloration and turgor, no rashes      Assessment and Plan ICD-10-CM    1. BPH with obstruction/lower urinary tract symptoms  N40.1 AMB POC URINALYSIS DIP STICK AUTO W/O MICRO    N13.8       2. Prostate cancer ([address]  C61       3. Recurrent UTI  N39.0       4. OAB (overactive bladder)  N32.81         BPH/LUTS/Prostate CA- PSA UND. Cysto showed radiation cystitis. Recurrent UTI- urine normal. Wean down to Keflfex 250 mg PO 3x/week x 2 weeks then STOP. Risks, benefits, and alternatives reviewed. OAB- improved. Cont Gemtesa 75 mg PO daily. Will hopefully wean OFF of this in the future. RTO in 4 weeks for follow up with me. Advised to call sooner if sx worsen. Nori Burns, FNP, APRN - CNP  Dr. Viri Devries is supervising physician today and he approves plan of care.

## 2023-03-06 ENCOUNTER — TELEPHONE (OUTPATIENT)
Dept: SLEEP MEDICINE | Age: 83
End: 2023-03-06

## 2023-03-06 NOTE — PROGRESS NOTES
[facility]  [address]  [place] [address]  [phone]    Patient Name:  Zacarias Olsen  YOB: 1940    Pursuant to the emergency declaration under the Wilks Act and the National Emergencies Act, 1135 waiver authority and the Coronavirus Preparedness and Response Supplemental Appropriations Act, this Virtual  Visit was conducted, with patient's consent, to reduce the patient's risk of exposure to COVID-19 and provide continuity of care for an established patient. Telehealth encounter is a billable service, with coverage as determined by the insurance carrier.    Services were provided through a phone discussion to substitute for in-person clinic visit.    Zacarias Olsen is a 82 y.o. male who was seen by synchronous (real-time) audio technology on 3/8/2023.      Consent:  He and/or his healthcare decision maker is aware that this patient-initiated Telehealth encounter is a billable service, with coverage as determined by his insurance carrier. He is aware that he may receive a bill and has provided verbal consent to proceed: Yes        Office Visit 3/8/2023    CHIEF COMPLAINT:    Chief Complaint   Patient presents with    Sleep Apnea       HISTORY OF PRESENT ILLNESS:  Patient is being seen today via phone visit.  He was diagnosed with sleep apnea on 10/12/2015 with an AHI of 40.8 with mostly central apneas.  He had tried using BiPAP for many years but abandoned use of BiPAP last September.  It was noted at his last clinic visit that he was sleeping better and did not have as much agitation at night without using the BiPAP.  Dr. Fernandez did do a overnight oximetry study without BiPAP use and he did have oxygen saturations less than 89% for 11 minutes and since he was sleeping sleep much better without the BiPAP it was decided that he could stay off of BiPAP with a close follow-up to assure stability.  He reports today that he is still sleeping well and does  not have a lot of daytime sleepiness and fatigue. Correll score is 9/24. He states that he is no longer having any problems with agitation during the night while trying to sleep. He is also no longer taking melatonin for REM behavior disorder. He did want to review the overnight oximetry results again and we did. He denies any major medical changes over the last 4 months. States that his blood pressure was good at his last checkup.       Sleep Medicine 3/8/2023 11/8/2022 3/7/2022 12/7/2021   Sitting and reading 1 2 1 1   Watching TV 2 2 1 1   Sitting, inactive in a public place (e.g. a theatre or a meeting) 1 0 0 0   As a passenger in a car for an hour without a break 1 0 0 1   Lying down to rest in the afternoon when circumstances permit 2 2 0 1   Sitting and talking to someone 1 0 0 0   Sitting quietly after a lunch without alcohol 1 2 1 1   In a car, while stopped for a few minutes in traffic 0 0 0 0   Correll Sleepiness Score 9 8 3 5        Past Medical History:   Diagnosis Date    Arthritis     OA generalized    Buerger's disease ([address]     left leg blood clot     CAD (coronary artery disease)     Decreased mobility     Gout     Hematuria, microscopic     Hiatal hernia     Personal history of prostate cancer     Prostate cancer ([address]     2000    Radiation cystitis 9/1/2020    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    Sleep apnea        Patient Active Problem List   Diagnosis    Renal cyst    Parkinson disease ([address]    CAD (coronary artery disease)    Obstructive sleep apnea syndrome    RBD (REM behavioral disorder)    Atrial fibrillation ([address]    Prostate cancer ([address]    Chronic renal disease, stage III ([address] [[id number]]    Encounter for medication administration    Slow transit constipation           Past Surgical History:   Procedure Laterality Date    CARDIAC CATHETERIZATION  08/03/2020    CATARACT REMOVAL Bilateral     IOL    COLONOSCOPY      poplyp removed    CYST REMOVAL Left     shoulder    OTHER SURGICAL HISTORY prostate radiation    [place] APPENDECTOMY      TURP             Social History     Socioeconomic History    Marital status:      Spouse name: Not on file    Number of children: Not on file    Years of education: Not on file    Highest education level: Not on file   Occupational History    Not on file   Tobacco Use    Smoking status: Former     Types: Cigarettes     Quit date: 1960     Years since quittin.2    Smokeless tobacco: Never   Substance and Sexual Activity    Alcohol use: No    Drug use: Never    Sexual activity: Not on file   Other Topics Concern    Not on file   Social History Narrative    Drinks 1 cup of caffeine per day     Social Determinants of Health     Financial Resource Strain: Low Risk     Difficulty of Paying Living Expenses: Not hard at all   Food Insecurity: No Food Insecurity    Worried About Running Out of Food in the Last Year: Never true    Ran Out of Food in the Last Year: Never true   Transportation Needs: Not on file   Physical Activity: Inactive    Days of Exercise per Week: 0 days    Minutes of Exercise per Session: 0 min   Stress: Not on file   Social Connections: Not on file   Intimate Partner Violence: Not on file   Housing Stability: Not on file         Family History   Problem Relation Age of Onset    Stroke Father     Cancer Father     Stroke Mother     Pulmonary Embolism Brother     Heart Disease Mother     Heart Disease Father          Allergies   Allergen Reactions    Tamsulosin Other (See Comments)    Adhesive Tape Rash         Current Outpatient Medications   Medication Sig    carbidopa-levodopa (SINEMET)  MG per tablet Take 2 tablets by mouth 3 times daily    famotidine (PEPCID) 20 MG tablet TAKE 1 TABLET BY MOUTH NIGHTLY AS NEEDED (NAUSEA)    Vibegron (GEMTESA) 75 MG TABS Take 75 mg by mouth daily    Multiple Vitamin (MULTIVITAMIN ADULT PO) Take 1 tablet by mouth daily    aspirin 81 MG EC tablet Take 81 mg by mouth daily    cyanocobalamin 1000 MCG/ML injection 100 mcg every 30 days    cephALEXin (KEFLEX) 250 MG capsule Take 1 capsule by mouth daily    nystatin (MYCOSTATIN) 625425 UNIT/GM ointment Apply topically 2 times daily. No current facility-administered medications for this visit. REVIEW OF SYSTEMS:   CONSTITUTIONAL:   There is no history of fever, chills, night sweats. Patient has negative weight loss or weight gain. Negative for fatigue and hypersomnia. CARDIAC:   No chest pain, pressure, discomfort, palpitations, orthopnea, murmurs, or edema. GI:   No dysphagia, heartburn reflux, nausea/vomiting, diarrhea, abdominal pain, or bleeding. NEURO:   There is no history of AMS, persistent headache, decreased level of consciousness, seizures, or motor or sensory deficits. VIRTUAL EXAM  PHYSICAL EXAMINATION:  No physical exam conducted due to phone visit only      ASSESSMENT:  (Medical Decision Making)       ICD-10-CM    1. Obstructive sleep apnea syndrome  G47.33 Patient no longer using BiPAP but reports that he is sleeping well through the night and does not have any daytime fatigue or sleepiness. 2. RBD (REM behavioral disorder)  G47.52 Patient reports he no longer has problems with agitation during his sleep and is no longer taking melatonin. Controlled without treatment at this point            PLAN:  The patient will continue to stay off of BiPAP therapy. Continue to stay off of melatonin. Follow-up in 6 months or sooner if needed      No orders of the defined types were placed in this encounter. Collaborating Physician: Dr. Reanna Zhao      I spent at least 12 minutes with this established patient, and >50% of the time was spent counseling and/or coordinating care regarding AILEEN, REM Behavior disorder.     [address], APRN - CNP  Electronically signed

## 2023-03-06 NOTE — TELEPHONE ENCOUNTER
Called pt to remind him t bring machine . He states he has not been using machine. He states he would like to keep  virtual appt. He states he cannot come into the office.

## 2023-03-08 ENCOUNTER — TELEMEDICINE (OUTPATIENT)
Dept: SLEEP MEDICINE | Age: 83
End: 2023-03-08
Payer: MEDICARE

## 2023-03-08 DIAGNOSIS — G47.33 OBSTRUCTIVE SLEEP APNEA SYNDROME: Primary | ICD-10-CM

## 2023-03-08 DIAGNOSIS — G47.52 RBD (REM BEHAVIORAL DISORDER): ICD-10-CM

## 2023-03-08 PROCEDURE — 99442 PR PHYS/QHP TELEPHONE EVALUATION 11-20 MIN: CPT | Performed by: NURSE PRACTITIONER

## 2023-03-08 ASSESSMENT — SLEEP AND FATIGUE QUESTIONNAIRES
HOW LIKELY ARE YOU TO NOD OFF OR FALL ASLEEP WHILE SITTING AND TALKING TO SOMEONE: 1
ESS TOTAL SCORE: 9
HOW LIKELY ARE YOU TO NOD OFF OR FALL ASLEEP WHILE SITTING QUIETLY AFTER LUNCH WITHOUT ALCOHOL: 1
HOW LIKELY ARE YOU TO NOD OFF OR FALL ASLEEP WHILE WATCHING TV: 2
HOW LIKELY ARE YOU TO NOD OFF OR FALL ASLEEP WHILE SITTING INACTIVE IN A PUBLIC PLACE: 1
HOW LIKELY ARE YOU TO NOD OFF OR FALL ASLEEP IN A CAR, WHILE STOPPED FOR A FEW MINUTES IN TRAFFIC: 0
HOW LIKELY ARE YOU TO NOD OFF OR FALL ASLEEP WHILE LYING DOWN TO REST IN THE AFTERNOON WHEN CIRCUMSTANCES PERMIT: 2
HOW LIKELY ARE YOU TO NOD OFF OR FALL ASLEEP WHEN YOU ARE A PASSENGER IN A CAR FOR AN HOUR WITHOUT A BREAK: 1
HOW LIKELY ARE YOU TO NOD OFF OR FALL ASLEEP WHILE SITTING AND READING: 1

## 2023-03-08 NOTE — PATIENT INSTRUCTIONS
The patient will continue to stay off of BiPAP therapy. Continue to stay off of melatonin.     Follow-up in 6 months or sooner if needed

## 2023-03-15 ENCOUNTER — OFFICE VISIT (OUTPATIENT)
Dept: UROLOGY | Age: 83
End: 2023-03-15
Payer: MEDICARE

## 2023-03-15 DIAGNOSIS — C61 PROSTATE CANCER (HCC): ICD-10-CM

## 2023-03-15 DIAGNOSIS — N39.0 RECURRENT UTI: ICD-10-CM

## 2023-03-15 DIAGNOSIS — N40.1 BPH WITH OBSTRUCTION/LOWER URINARY TRACT SYMPTOMS: Primary | ICD-10-CM

## 2023-03-15 DIAGNOSIS — N32.81 OAB (OVERACTIVE BLADDER): ICD-10-CM

## 2023-03-15 DIAGNOSIS — N13.8 BPH WITH OBSTRUCTION/LOWER URINARY TRACT SYMPTOMS: Primary | ICD-10-CM

## 2023-03-15 LAB
BILIRUBIN, URINE, POC: NEGATIVE
BLOOD URINE, POC: NORMAL
GLUCOSE URINE, POC: NEGATIVE
KETONES, URINE, POC: NEGATIVE
LEUKOCYTE ESTERASE, URINE, POC: NEGATIVE
NITRITE, URINE, POC: NEGATIVE
PH, URINE, POC: 6 (ref 4.6–8)
PROTEIN,URINE, POC: NEGATIVE
SPECIFIC GRAVITY, URINE, POC: 1.03 (ref 1–1.03)
URINALYSIS CLARITY, POC: NORMAL
URINALYSIS COLOR, POC: NORMAL
UROBILINOGEN, POC: NORMAL

## 2023-03-15 PROCEDURE — 1036F TOBACCO NON-USER: CPT | Performed by: NURSE PRACTITIONER

## 2023-03-15 PROCEDURE — 99214 OFFICE O/P EST MOD 30 MIN: CPT | Performed by: NURSE PRACTITIONER

## 2023-03-15 PROCEDURE — G8427 DOCREV CUR MEDS BY ELIG CLIN: HCPCS | Performed by: NURSE PRACTITIONER

## 2023-03-15 PROCEDURE — G8420 CALC BMI NORM PARAMETERS: HCPCS | Performed by: NURSE PRACTITIONER

## 2023-03-15 PROCEDURE — 1123F ACP DISCUSS/DSCN MKR DOCD: CPT | Performed by: NURSE PRACTITIONER

## 2023-03-15 PROCEDURE — G8484 FLU IMMUNIZE NO ADMIN: HCPCS | Performed by: NURSE PRACTITIONER

## 2023-03-15 PROCEDURE — 81003 URINALYSIS AUTO W/O SCOPE: CPT | Performed by: NURSE PRACTITIONER

## 2023-03-15 NOTE — PROGRESS NOTES
[facility]  9 [facility] [address], [address] [facility]  [phone]          Eleni Kawasaki  : 1940    Chief Complaint   Patient presents with    Follow-up     BPH with obstruction/lower urinary tract symptoms evaluation. HPI     Eleni Kawasaki is a 79 y.o. male prev followed by Dr Jose L Max. H/O prostate CA. Underwent radiation tx and hormone deprivation in . PSA  remains UND. H/O previous TURP. H/O renal cysts. CT  showed stable benign renal cysts. No recent imaging. sCr 1.30 ()     Had UTI earlier this month. Urine culture 22 grew E Coli. Completed antibiotic for this. No UTI sx today. He has a long h/o urinary frequency and urgency. He reports nocturia x 2-3/night. NO weak stream. He was previously on Flomax but had to stop this d/t SE. He has also tried Vesicare, oxybutynin, and myrbetriq. PVR 0 cc via US. He has been on trial of Gemtesa. Reports some improvement. He unfortunately developed recurrent of UTI. E Coli UTI  and 10/22. Having UTI sx today. No fever/chills, n/v, flank pain, or gross hematuria. No h/o renal stones. had in office cysto  w Dr Ovidio Prasad. This showed radiation cystitis. No other findings. BRETT 22 showed B renal cysts but no other findings. Patient also has h/o Parksinson's disease. Back today for follow up. Doing well on Gemtesa and Keflex. weaned OFF Keflex last month. Back for follow up. He has NO issues.       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    Sleep apnea      Past Surgical History:   Procedure Laterality Date    CARDIAC CATHETERIZATION  2020 CATARACT REMOVAL Bilateral     IOL    COLONOSCOPY      poplyp removed    CYST REMOVAL Left     shoulder    OTHER SURGICAL HISTORY      prostate radiation    [place] APPENDECTOMY      TURP       Current Outpatient Medications   Medication Sig Dispense Refill    carbidopa-levodopa (SINEMET)  MG per tablet Take 2 tablets by mouth 3 times daily 540 tablet 3    famotidine (PEPCID) 20 MG tablet TAKE 1 TABLET BY MOUTH NIGHTLY AS NEEDED (NAUSEA) 90 tablet 1    Multiple Vitamin (MULTIVITAMIN ADULT PO) Take 1 tablet by mouth daily      aspirin 81 MG EC tablet Take 81 mg by mouth daily      cyanocobalamin 1000 MCG/ML injection 100 mcg every 30 days       No current facility-administered medications for this visit.      Allergies   Allergen Reactions    Tamsulosin Other (See Comments)    Adhesive Tape Rash     Social History     Socioeconomic History    Marital status:      Spouse name: Not on file    Number of children: Not on file    Years of education: Not on file    Highest education level: Not on file   Occupational History    Not on file   Tobacco Use    Smoking status: Former     Types: Cigarettes     Quit date: 1960     Years since quittin.2    Smokeless tobacco: Never   Substance and Sexual Activity    Alcohol use: No    Drug use: Never    Sexual activity: Not on file   Other Topics Concern    Not on file   Social History Narrative    Drinks 1 cup of caffeine per day     Social Determinants of Health     Financial Resource Strain: Low Risk     Difficulty of Paying Living Expenses: Not hard at all   Food Insecurity: No Food Insecurity    Worried About Running Out of Food in the Last Year: Never true    Ran Out of Food in the Last Year: Never true   Transportation Needs: Not on file   Physical Activity: Inactive    Days of Exercise per Week: 0 days    Minutes of Exercise per Session: 0 min   Stress: Not on file   Social Connections: Not on file   Intimate Partner Violence: Not on file   Housing Stability: Not on file     Family History   Problem Relation Age of Onset    Stroke Father     Cancer Father     Stroke Mother     Pulmonary Embolism Brother     Heart Disease Mother     Heart Disease Father        ROS    Urinalysis  UA - Dipstick  Results for orders placed or performed in visit on 03/15/23   AMB POC URINALYSIS DIP STICK AUTO W/O MICRO   Result Value Ref Range    Color (UA POC)      Clarity (UA POC)      Glucose, Urine, POC Negative Negative    Bilirubin, Urine, POC Negative Negative    KETONES, Urine, POC Negative Negative    Specific Gravity, Urine, POC 1.030 1.001 - 1.035    Blood (UA POC) Trace-intact Negative    pH, Urine, POC 6.0 4.6 - 8.0    Protein, Urine, POC Negative Negative    Urobilinogen, POC 0.2 mg/dL     Nitrite, Urine, POC Negative Negative    Leukocyte Esterase, Urine, POC Negative Negative   Results for orders placed or performed in visit on 02/15/23   AMB POC URINALYSIS DIP STICK AUTO W/O MICRO   Result Value Ref Range    Color, Urine, POC      Clarity, Urine, POC      Glucose, Urine, POC Negative Negative    Bilirubin, Urine, POC Negative Negative    Ketones, Urine, POC Trace Negative    Specific Gravity, Urine, POC 1.020 1.001 - 1.035    Blood, Urine, POC Trace-intact Negative    pH, Urine, POC 5.0 4.6 - 8.0    Protein, Urine, POC Negative Negative    Urobilinogen, POC 0.2     Nitrite, Urine, POC Negative Negative    Leukocyte Esterase, Urine, POC Negative Negative       UA - Micro  WBC - 0  RBC - 0  Bacteria - 0  Epith - 0      PHYSICAL EXAM    General appearance - well appearing and in no distress  Mental status - alert, oriented to person, place, and time  Neck - supple, no significant adenopathy  Chest/Lung-  Quiet, even and easy respiratory effort without use of accessory muscles  Skin - normal coloration and turgor, no rashes    Assessment and Plan    ICD-10-CM    1.  BPH with obstruction/lower urinary tract symptoms  N40.1 AMB POC URINALYSIS DIP STICK AUTO W/O MICRO    N13.8 2. Recurrent UTI  N39.0       3. Prostate cancer ([facility]  C61       4. OAB (overactive bladder)  N32.81         BPH/LUTS- stable. Cont to follow. Recurrent UTI- urine normal. NO add antibiotic tx needed. Advised to call this office if develops sx of UTI for specimen check. If unable to come to this office, encouraged to have UC performed and records sent to this office. OAB- STOP Gemtesa 75 mg PO daily. He will update me on sx in the next few weeks. Prostate CA- cont to follow. He opts to leave follow up open ended. Awaiting sx OFF medication. Advised to call me as needed. Bal Rosario is supervising physician today and he approves plan of care.

## 2023-03-23 RX ORDER — CYANOCOBALAMIN 1000 UG/ML
1000 INJECTION, SOLUTION INTRAMUSCULAR; SUBCUTANEOUS
Qty: 3 ML | Refills: 3 | Status: SHIPPED | OUTPATIENT
Start: 2023-03-23

## 2023-05-02 DIAGNOSIS — R11.0 NAUSEA: ICD-10-CM

## 2023-05-02 RX ORDER — FAMOTIDINE 20 MG/1
20 TABLET, FILM COATED ORAL NIGHTLY PRN
Qty: 90 TABLET | Refills: 3 | Status: SHIPPED | OUTPATIENT
Start: 2023-05-02

## 2023-05-09 ENCOUNTER — OFFICE VISIT (OUTPATIENT)
Dept: INTERNAL MEDICINE CLINIC | Facility: CLINIC | Age: 83
End: 2023-05-09
Payer: MEDICARE

## 2023-05-09 VITALS
DIASTOLIC BLOOD PRESSURE: 67 MMHG | WEIGHT: 175.6 LBS | SYSTOLIC BLOOD PRESSURE: 106 MMHG | HEART RATE: 101 BPM | TEMPERATURE: 97.3 F | RESPIRATION RATE: 16 BRPM | HEIGHT: 72 IN | BODY MASS INDEX: 23.78 KG/M2

## 2023-05-09 DIAGNOSIS — R53.82 CHRONIC FATIGUE, UNSPECIFIED: ICD-10-CM

## 2023-05-09 DIAGNOSIS — L89.151 PRESSURE INJURY OF SACRAL REGION, STAGE 1: ICD-10-CM

## 2023-05-09 DIAGNOSIS — I25.10 CORONARY ARTERY DISEASE INVOLVING NATIVE CORONARY ARTERY OF NATIVE HEART WITHOUT ANGINA PECTORIS: ICD-10-CM

## 2023-05-09 DIAGNOSIS — M25.512 ACUTE PAIN OF LEFT SHOULDER: ICD-10-CM

## 2023-05-09 DIAGNOSIS — G47.33 OBSTRUCTIVE SLEEP APNEA SYNDROME: ICD-10-CM

## 2023-05-09 DIAGNOSIS — I48.0 PAROXYSMAL ATRIAL FIBRILLATION (HCC): ICD-10-CM

## 2023-05-09 DIAGNOSIS — R30.0 DYSURIA: ICD-10-CM

## 2023-05-09 DIAGNOSIS — G20 PARKINSON DISEASE (HCC): ICD-10-CM

## 2023-05-09 DIAGNOSIS — E87.1 HYPONATREMIA: ICD-10-CM

## 2023-05-09 DIAGNOSIS — G20 PARKINSON'S DISEASE (HCC): Primary | ICD-10-CM

## 2023-05-09 DIAGNOSIS — C61 PROSTATE CANCER (HCC): ICD-10-CM

## 2023-05-09 LAB
ALBUMIN SERPL-MCNC: 3.5 G/DL (ref 3.2–4.6)
ALBUMIN/GLOB SERPL: 0.9 (ref 0.4–1.6)
ALP SERPL-CCNC: 87 U/L (ref 50–136)
ALT SERPL-CCNC: 7 U/L (ref 12–65)
ANION GAP SERPL CALC-SCNC: 7 MMOL/L (ref 2–11)
APPEARANCE UR: CLEAR
AST SERPL-CCNC: 10 U/L (ref 15–37)
BASOPHILS # BLD: 0.1 K/UL (ref 0–0.2)
BASOPHILS NFR BLD: 1 % (ref 0–2)
BILIRUB SERPL-MCNC: 0.4 MG/DL (ref 0.2–1.1)
BILIRUB UR QL: NEGATIVE
BUN SERPL-MCNC: 42 MG/DL (ref 8–23)
CALCIUM SERPL-MCNC: 9.1 MG/DL (ref 8.3–10.4)
CHLORIDE SERPL-SCNC: 104 MMOL/L (ref 101–110)
CO2 SERPL-SCNC: 26 MMOL/L (ref 21–32)
COLOR UR: ABNORMAL
CREAT SERPL-MCNC: 1.2 MG/DL (ref 0.8–1.5)
DIFFERENTIAL METHOD BLD: ABNORMAL
EOSINOPHIL # BLD: 0.2 K/UL (ref 0–0.8)
EOSINOPHIL NFR BLD: 2 % (ref 0.5–7.8)
ERYTHROCYTE [DISTWIDTH] IN BLOOD BY AUTOMATED COUNT: 14.5 % (ref 11.9–14.6)
GLOBULIN SER CALC-MCNC: 3.8 G/DL (ref 2.8–4.5)
GLUCOSE SERPL-MCNC: 143 MG/DL (ref 65–100)
GLUCOSE UR STRIP.AUTO-MCNC: NEGATIVE MG/DL
HCT VFR BLD AUTO: 38.5 % (ref 41.1–50.3)
HGB BLD-MCNC: 11.9 G/DL (ref 13.6–17.2)
HGB UR QL STRIP: NEGATIVE
IMM GRANULOCYTES # BLD AUTO: 0 K/UL (ref 0–0.5)
IMM GRANULOCYTES NFR BLD AUTO: 0 % (ref 0–5)
KETONES UR QL STRIP.AUTO: ABNORMAL MG/DL
LEUKOCYTE ESTERASE UR QL STRIP.AUTO: ABNORMAL
LYMPHOCYTES # BLD: 1.3 K/UL (ref 0.5–4.6)
LYMPHOCYTES NFR BLD: 16 % (ref 13–44)
MCH RBC QN AUTO: 29.7 PG (ref 26.1–32.9)
MCHC RBC AUTO-ENTMCNC: 30.9 G/DL (ref 31.4–35)
MCV RBC AUTO: 96 FL (ref 82–102)
MONOCYTES # BLD: 0.6 K/UL (ref 0.1–1.3)
MONOCYTES NFR BLD: 8 % (ref 4–12)
NEUTS SEG # BLD: 5.9 K/UL (ref 1.7–8.2)
NEUTS SEG NFR BLD: 73 % (ref 43–78)
NITRITE UR QL STRIP.AUTO: NEGATIVE
NRBC # BLD: 0 K/UL (ref 0–0.2)
PH UR STRIP: 5 (ref 5–9)
PLATELET # BLD AUTO: 184 K/UL (ref 150–450)
PMV BLD AUTO: 12.2 FL (ref 9.4–12.3)
POTASSIUM SERPL-SCNC: 4.7 MMOL/L (ref 3.5–5.1)
PROT SERPL-MCNC: 7.3 G/DL (ref 6.3–8.2)
PROT UR STRIP-MCNC: NEGATIVE MG/DL
RBC # BLD AUTO: 4.01 M/UL (ref 4.23–5.6)
SODIUM SERPL-SCNC: 137 MMOL/L (ref 133–143)
SP GR UR REFRACTOMETRY: 1.02 (ref 1–1.02)
UROBILINOGEN UR QL STRIP.AUTO: 0.2 EU/DL (ref 0.2–1)
WBC # BLD AUTO: 8 K/UL (ref 4.3–11.1)

## 2023-05-09 PROCEDURE — G8420 CALC BMI NORM PARAMETERS: HCPCS | Performed by: INTERNAL MEDICINE

## 2023-05-09 PROCEDURE — 1036F TOBACCO NON-USER: CPT | Performed by: INTERNAL MEDICINE

## 2023-05-09 PROCEDURE — G8427 DOCREV CUR MEDS BY ELIG CLIN: HCPCS | Performed by: INTERNAL MEDICINE

## 2023-05-09 PROCEDURE — 1123F ACP DISCUSS/DSCN MKR DOCD: CPT | Performed by: INTERNAL MEDICINE

## 2023-05-09 PROCEDURE — 99214 OFFICE O/P EST MOD 30 MIN: CPT | Performed by: INTERNAL MEDICINE

## 2023-05-09 RX ORDER — NYSTATIN 100000 U/G
OINTMENT TOPICAL
Qty: 30 G | Refills: 11 | Status: SHIPPED | OUTPATIENT
Start: 2023-05-09

## 2023-05-09 RX ORDER — NYSTATIN 100000 U/G
OINTMENT TOPICAL 2 TIMES DAILY
COMMUNITY
Start: 2023-04-18 | End: 2023-05-09 | Stop reason: SDUPTHER

## 2023-05-09 SDOH — ECONOMIC STABILITY: FOOD INSECURITY: WITHIN THE PAST 12 MONTHS, YOU WORRIED THAT YOUR FOOD WOULD RUN OUT BEFORE YOU GOT MONEY TO BUY MORE.: NEVER TRUE

## 2023-05-09 SDOH — ECONOMIC STABILITY: HOUSING INSECURITY
IN THE LAST 12 MONTHS, WAS THERE A TIME WHEN YOU DID NOT HAVE A STEADY PLACE TO SLEEP OR SLEPT IN A SHELTER (INCLUDING NOW)?: NO

## 2023-05-09 SDOH — ECONOMIC STABILITY: INCOME INSECURITY: HOW HARD IS IT FOR YOU TO PAY FOR THE VERY BASICS LIKE FOOD, HOUSING, MEDICAL CARE, AND HEATING?: NOT VERY HARD

## 2023-05-09 SDOH — ECONOMIC STABILITY: FOOD INSECURITY: WITHIN THE PAST 12 MONTHS, THE FOOD YOU BOUGHT JUST DIDN'T LAST AND YOU DIDN'T HAVE MONEY TO GET MORE.: NEVER TRUE

## 2023-05-09 ASSESSMENT — PATIENT HEALTH QUESTIONNAIRE - PHQ9
SUM OF ALL RESPONSES TO PHQ QUESTIONS 1-9: 0
SUM OF ALL RESPONSES TO PHQ9 QUESTIONS 1 & 2: 0
2. FEELING DOWN, DEPRESSED OR HOPELESS: 0
SUM OF ALL RESPONSES TO PHQ QUESTIONS 1-9: 0
1. LITTLE INTEREST OR PLEASURE IN DOING THINGS: 0
SUM OF ALL RESPONSES TO PHQ QUESTIONS 1-9: 0
SUM OF ALL RESPONSES TO PHQ QUESTIONS 1-9: 0

## 2023-05-09 NOTE — PROGRESS NOTES
5/9/2023 5:19 PM  Location:[facility] [address] [facility]  [place]  Patient #:  [id number]  YOB: 1940          YOUR LAST HEMOGLOBIN A1CS:   No results found for: HBA1C, [id number]    YOUR LAST LIPID PROFILE:   Lab Results   Component Value Date/Time    CHOL 155 02/09/2022 10:40 AM    HDL 58 02/09/2022 10:40 AM    VLDL 13 02/09/2022 10:40 AM         Lab Results   Component Value Date/Time    GFRAA >60 08/08/2022 11:18 AM    BUN 38 11/08/2022 02:49 PM    NAPOC 141 08/10/2020 12:43 PM     11/08/2022 02:49 PM    K 4.4 11/08/2022 02:49 PM     11/08/2022 02:49 PM    CO2 28 11/08/2022 02:49 PM           History of Present Illness     Chief Complaint   Patient presents with    6 Month Follow-Up     Pt presents to the office today for a 6 month follow-up      Fall     Had a fall at home about a month ago; fell and hit the floor and hurt his left shoulder- the shoulder if very painful and he cannot lift the arm    Dysuria     Having a little bit of burning; frequency; not all the time     Skin Ulcer     Have some bed sores where he have been in the bed too much    Other     Parkinson's Seem to be getting worse     This patient states that he feels that his Parkinson symptoms have worsened over time he is followed by neurology and no recent changes have been made with medications. He is describing some increasing urinary frequency and some burning no fevers or chills. He has had pressure issues on his lower sacrum for some time and is applying nystatin ointment.   Approximately 1 month ago the patient fell injuring the left shoulder and he has had difficulty with range of motion since then and did not seek any medical attention at that time    Mr. Chacorta Maynard is a 79 y.o. male  who presents for office visit      Allergies   Allergen Reactions    Tamsulosin Other (See Comments)    Adhesive Tape Rash     Past Medical History:   Diagnosis Date    Arthritis     OA

## 2023-05-10 ENCOUNTER — TELEPHONE (OUTPATIENT)
Dept: INTERNAL MEDICINE CLINIC | Facility: CLINIC | Age: 83
End: 2023-05-10

## 2023-05-10 DIAGNOSIS — T14.8XXA FRACTURE: Primary | ICD-10-CM

## 2023-05-10 DIAGNOSIS — M25.512 ACUTE PAIN OF LEFT SHOULDER: ICD-10-CM

## 2023-05-10 LAB
BACTERIA URNS QL MICRO: 0 /HPF
CASTS URNS QL MICRO: 0 /LPF
CRYSTALS URNS QL MICRO: 0 /LPF
EPI CELLS #/AREA URNS HPF: NORMAL /HPF
MUCOUS THREADS URNS QL MICRO: 0 /LPF
OTHER OBSERVATIONS: NORMAL
RBC #/AREA URNS HPF: NORMAL /HPF
WBC URNS QL MICRO: NORMAL /HPF

## 2023-05-10 NOTE — TELEPHONE ENCOUNTER
----- Message from Vandana English MD sent at 5/10/2023  4:14 PM EDT -----   I called the pt's wife , he has a comminuted fracture of the left shouder, occurred 2-3 weeks ago, we need to make  a referral to [facility] [address] ortho in [place] for left humeral fracture cms

## 2023-05-12 LAB
BACTERIA SPEC CULT: NORMAL
SERVICE CMNT-IMP: NORMAL

## 2023-05-25 NOTE — TELEPHONE ENCOUNTER
Pt's daughter called to report her mom accidentally put his nitroglycerin through the washing machine. He was prescribed this by a doctor in the hospital a while back. Is there any way they can get another prescription for this called in to [facility] in [place]? Dr Elsa Boyd prescribed it initially.

## 2023-05-26 RX ORDER — NITROGLYCERIN 0.4 MG/1
0.4 TABLET SUBLINGUAL EVERY 5 MIN PRN
Qty: 25 TABLET | Refills: 5 | Status: SHIPPED | OUTPATIENT
Start: 2023-05-26

## 2023-06-26 ENCOUNTER — OFFICE VISIT (OUTPATIENT)
Dept: NEUROLOGY | Age: 83
End: 2023-06-26
Payer: MEDICARE

## 2023-06-26 VITALS
WEIGHT: 176 LBS | SYSTOLIC BLOOD PRESSURE: 114 MMHG | DIASTOLIC BLOOD PRESSURE: 74 MMHG | OXYGEN SATURATION: 96 % | BODY MASS INDEX: 23.87 KG/M2 | HEART RATE: 86 BPM

## 2023-06-26 DIAGNOSIS — R26.89 ABNORMALITY OF GAIT DUE TO IMPAIRMENT OF BALANCE: ICD-10-CM

## 2023-06-26 DIAGNOSIS — G62.9 PERIPHERAL POLYNEUROPATHY: ICD-10-CM

## 2023-06-26 DIAGNOSIS — G20 DEMENTIA ASSOCIATED WITH PARKINSON'S DISEASE (HCC): ICD-10-CM

## 2023-06-26 DIAGNOSIS — G20 PARKINSON'S DISEASE (HCC): Primary | ICD-10-CM

## 2023-06-26 DIAGNOSIS — F02.80 DEMENTIA ASSOCIATED WITH PARKINSON'S DISEASE (HCC): ICD-10-CM

## 2023-06-26 PROCEDURE — 1123F ACP DISCUSS/DSCN MKR DOCD: CPT | Performed by: PSYCHIATRY & NEUROLOGY

## 2023-06-26 PROCEDURE — G8420 CALC BMI NORM PARAMETERS: HCPCS | Performed by: PSYCHIATRY & NEUROLOGY

## 2023-06-26 PROCEDURE — 99215 OFFICE O/P EST HI 40 MIN: CPT | Performed by: PSYCHIATRY & NEUROLOGY

## 2023-06-26 PROCEDURE — 1036F TOBACCO NON-USER: CPT | Performed by: PSYCHIATRY & NEUROLOGY

## 2023-06-26 PROCEDURE — G8427 DOCREV CUR MEDS BY ELIG CLIN: HCPCS | Performed by: PSYCHIATRY & NEUROLOGY

## 2023-06-26 RX ORDER — DONEPEZIL HYDROCHLORIDE 10 MG/1
10 TABLET, FILM COATED ORAL NIGHTLY
Qty: 30 TABLET | Refills: 5 | Status: SHIPPED | OUTPATIENT
Start: 2023-06-26

## 2023-06-26 ASSESSMENT — PATIENT HEALTH QUESTIONNAIRE - PHQ9
SUM OF ALL RESPONSES TO PHQ QUESTIONS 1-9: 0
SUM OF ALL RESPONSES TO PHQ9 QUESTIONS 1 & 2: 0
2. FEELING DOWN, DEPRESSED OR HOPELESS: 0
1. LITTLE INTEREST OR PLEASURE IN DOING THINGS: 0
SUM OF ALL RESPONSES TO PHQ QUESTIONS 1-9: 0

## 2023-07-14 ENCOUNTER — OFFICE VISIT (OUTPATIENT)
Age: 83
End: 2023-07-14
Payer: MEDICARE

## 2023-07-14 VITALS
BODY MASS INDEX: 23.98 KG/M2 | WEIGHT: 177 LBS | HEART RATE: 98 BPM | DIASTOLIC BLOOD PRESSURE: 54 MMHG | SYSTOLIC BLOOD PRESSURE: 100 MMHG | HEIGHT: 72 IN

## 2023-07-14 DIAGNOSIS — G30.9 ALZHEIMER'S DISEASE, UNSPECIFIED (CODE) (HCC): ICD-10-CM

## 2023-07-14 DIAGNOSIS — N18.31 STAGE 3A CHRONIC KIDNEY DISEASE (HCC): ICD-10-CM

## 2023-07-14 DIAGNOSIS — G47.33 OBSTRUCTIVE SLEEP APNEA SYNDROME: ICD-10-CM

## 2023-07-14 DIAGNOSIS — I25.10 CORONARY ARTERY DISEASE INVOLVING NATIVE CORONARY ARTERY OF NATIVE HEART WITHOUT ANGINA PECTORIS: ICD-10-CM

## 2023-07-14 DIAGNOSIS — I48.0 PAROXYSMAL ATRIAL FIBRILLATION (HCC): Primary | ICD-10-CM

## 2023-07-14 PROCEDURE — 1036F TOBACCO NON-USER: CPT | Performed by: INTERNAL MEDICINE

## 2023-07-14 PROCEDURE — 99214 OFFICE O/P EST MOD 30 MIN: CPT | Performed by: INTERNAL MEDICINE

## 2023-07-14 PROCEDURE — 1123F ACP DISCUSS/DSCN MKR DOCD: CPT | Performed by: INTERNAL MEDICINE

## 2023-07-14 PROCEDURE — G8428 CUR MEDS NOT DOCUMENT: HCPCS | Performed by: INTERNAL MEDICINE

## 2023-07-14 PROCEDURE — G8420 CALC BMI NORM PARAMETERS: HCPCS | Performed by: INTERNAL MEDICINE

## 2023-08-07 ENCOUNTER — TELEPHONE (OUTPATIENT)
Dept: SLEEP MEDICINE | Age: 83
End: 2023-08-07

## 2023-08-07 NOTE — TELEPHONE ENCOUNTER
Called garyfatuma , pt daughter made her aware . She requested more information on inspire . Will mail her inspire pamphlet       ----- Message from STEVEN Baltazar CNP sent at 8/3/2023  4:33 PM EDT -----  Regarding: RE: Inspire device eligibility   Contact: [phone]  Please contact her and let her know that in order to consider inspire therapy he would have to have a sleep study within 2 years so we would need to get a new sleep study. We could order a new home sleep study and then have him come in for evaluation and referred to inspire at that point. Thanks  ----- Message -----  From: Betsy Amaral MA  Sent: 8/3/2023  12:27 PM EDT  To: STEVEN Olvera CNP  Subject: Samira Karl: Inspire device eligibility                     ----- Message -----  From: Elias Conner  Sent: 8/2/2023  11:05 AM EDT  To: , #  Subject: Inspire device eligibility                       Hi, this is Magno Gates daughter. My father is a patient of Dr Sukhjinder Thacker. A few months ago, he stopped using his bipap machine because he was having so much trouble with it, and was sleeping better without it. He has lost a lot of weight since his heart surgery in August of 2020, and this has undoubtedly caused trouble with finding a mask that fits well. Very sadly, I'm not able to spend very much time visiting with my Father and helping with his care, as I'm the sole caregiver for my  whose Parkinson's disease, associated cognitive impairment, and adrenal insufficiency have advanced to the point of requiring 24/7 care. But my brother lives with my parents and is a good help to them. He tells me that my Dad is sleeping a lot during the day in his chair, and is concerned that he stops breathing for several seconds at a time while sleeping. So. Korin De La Cruz I'm wondering if my dad might be evaluated for the Inspire sleeping device. Thanks!

## 2023-09-07 NOTE — PROGRESS NOTES
[address] [place] [address]  [phone]    Patient Name:  Cesar Hooker  YOB: 1940    Telehealth encounter is a billable service, with coverage as determined by the insurance carrier. The patient was located at Home: [address] [facility]  The provider was located at Facility [facility] CT Dept): [facility]  [address] [facility] [address] [facility]  [phone]       Services were provided through a phone discussion to substitute for in-person clinic visit. Cesar Hooker is a 79 y.o. male who was seen by synchronous (real-time) audio technology on 9/8/2023. Consent:  He and/or his healthcare decision maker is aware that this patient-initiated Telehealth encounter is a billable service, with coverage as determined by his insurance carrier. He is aware that he may receive a bill and has provided verbal consent to proceed: Yes        Office Visit 9/8/2023    CHIEF COMPLAINT:    Chief Complaint   Patient presents with    Sleep Apnea       HISTORY OF PRESENT ILLNESS:  Patient is a 16-year-old male being seen today by phone visit for follow-up of AILEEN. Diagnostic sleep study on 10/12/2015 with an AHI of 40.8. Patient no longer uses PAP therapy. He previously had an overnight oximetry completed by previous provider which showed oxygen saturations less than 88% for 11 minutes. He reported that he was sleeping better without wearing CPAP and denied having any excessive sleepiness during the daytime. It was decided at that time to stop using CPAP. I did receive a MyChart message in early August from the patient's daughter stating that he was sleeping again throughout the day. She also inquired about inspire therapy. I did advise her at that time that we would have to have a new sleep study to be eligible for inspire.   In talking with the patient today he states that he will take a nap one time a

## 2023-09-08 ENCOUNTER — TELEMEDICINE (OUTPATIENT)
Dept: SLEEP MEDICINE | Age: 83
End: 2023-09-08
Payer: MEDICARE

## 2023-09-08 DIAGNOSIS — G47.33 OSA (OBSTRUCTIVE SLEEP APNEA): Primary | ICD-10-CM

## 2023-09-08 PROCEDURE — 99442 PR PHYS/QHP TELEPHONE EVALUATION 11-20 MIN: CPT | Performed by: NURSE PRACTITIONER

## 2023-09-08 ASSESSMENT — SLEEP AND FATIGUE QUESTIONNAIRES
HOW LIKELY ARE YOU TO NOD OFF OR FALL ASLEEP WHILE LYING DOWN TO REST IN THE AFTERNOON WHEN CIRCUMSTANCES PERMIT: 3
HOW LIKELY ARE YOU TO NOD OFF OR FALL ASLEEP WHEN YOU ARE A PASSENGER IN A CAR FOR AN HOUR WITHOUT A BREAK: 0
HOW LIKELY ARE YOU TO NOD OFF OR FALL ASLEEP WHILE SITTING AND READING: 0
HOW LIKELY ARE YOU TO NOD OFF OR FALL ASLEEP WHILE SITTING AND TALKING TO SOMEONE: 0
HOW LIKELY ARE YOU TO NOD OFF OR FALL ASLEEP WHILE SITTING INACTIVE IN A PUBLIC PLACE: 0
ESS TOTAL SCORE: 5
HOW LIKELY ARE YOU TO NOD OFF OR FALL ASLEEP WHILE WATCHING TV: 1
HOW LIKELY ARE YOU TO NOD OFF OR FALL ASLEEP IN A CAR, WHILE STOPPED FOR A FEW MINUTES IN TRAFFIC: 0
HOW LIKELY ARE YOU TO NOD OFF OR FALL ASLEEP WHILE SITTING QUIETLY AFTER LUNCH WITHOUT ALCOHOL: 1

## 2023-09-08 NOTE — PATIENT INSTRUCTIONS
Continue positional therapy for AILEEN  Follow-up with sleep medicine as needed or if symptoms worsen

## 2023-11-09 ENCOUNTER — OFFICE VISIT (OUTPATIENT)
Dept: INTERNAL MEDICINE CLINIC | Facility: CLINIC | Age: 83
End: 2023-11-09
Payer: MEDICARE

## 2023-11-09 VITALS
BODY MASS INDEX: 23.95 KG/M2 | DIASTOLIC BLOOD PRESSURE: 57 MMHG | TEMPERATURE: 96.8 F | OXYGEN SATURATION: 92 % | HEIGHT: 72 IN | WEIGHT: 176.8 LBS | SYSTOLIC BLOOD PRESSURE: 88 MMHG | HEART RATE: 93 BPM | RESPIRATION RATE: 16 BRPM

## 2023-11-09 DIAGNOSIS — I48.0 PAROXYSMAL ATRIAL FIBRILLATION (HCC): ICD-10-CM

## 2023-11-09 DIAGNOSIS — R53.82 CHRONIC FATIGUE: ICD-10-CM

## 2023-11-09 DIAGNOSIS — Z23 NEEDS FLU SHOT: Primary | ICD-10-CM

## 2023-11-09 DIAGNOSIS — K59.01 SLOW TRANSIT CONSTIPATION: ICD-10-CM

## 2023-11-09 DIAGNOSIS — G20.B1 PARKINSON'S DISEASE WITH DYSKINESIA WITHOUT FLUCTUATING MANIFESTATIONS: ICD-10-CM

## 2023-11-09 DIAGNOSIS — N18.31 STAGE 3A CHRONIC KIDNEY DISEASE (HCC): ICD-10-CM

## 2023-11-09 DIAGNOSIS — I95.1 ORTHOSTATIC HYPOTENSION: ICD-10-CM

## 2023-11-09 DIAGNOSIS — R30.0 DYSURIA: ICD-10-CM

## 2023-11-09 DIAGNOSIS — I25.10 CORONARY ARTERY DISEASE INVOLVING NATIVE CORONARY ARTERY OF NATIVE HEART WITHOUT ANGINA PECTORIS: ICD-10-CM

## 2023-11-09 DIAGNOSIS — C61 PROSTATE CANCER (HCC): ICD-10-CM

## 2023-11-09 DIAGNOSIS — G47.33 OBSTRUCTIVE SLEEP APNEA SYNDROME: ICD-10-CM

## 2023-11-09 DIAGNOSIS — Z00.00 MEDICARE ANNUAL WELLNESS VISIT, SUBSEQUENT: ICD-10-CM

## 2023-11-09 DIAGNOSIS — G20.A1 PARKINSON'S DISEASE WITHOUT DYSKINESIA OR FLUCTUATING MANIFESTATIONS: ICD-10-CM

## 2023-11-09 PROBLEM — L89.151 PRESSURE INJURY OF SACRAL REGION, STAGE 1: Status: RESOLVED | Noted: 2023-05-09 | Resolved: 2023-11-09

## 2023-11-09 LAB
APPEARANCE UR: CLEAR
BACTERIA URNS QL MICRO: ABNORMAL /HPF
BASOPHILS # BLD: 0.1 K/UL (ref 0–0.2)
BASOPHILS NFR BLD: 2 % (ref 0–2)
BILIRUB UR QL: NEGATIVE
CASTS URNS QL MICRO: ABNORMAL /LPF
COLOR UR: ABNORMAL
CORTIS BS SERPL-MCNC: 24.2 UG/DL
DIFFERENTIAL METHOD BLD: ABNORMAL
EOSINOPHIL # BLD: 0.1 K/UL (ref 0–0.8)
EOSINOPHIL NFR BLD: 1 % (ref 0.5–7.8)
EPI CELLS #/AREA URNS HPF: ABNORMAL /HPF
ERYTHROCYTE [DISTWIDTH] IN BLOOD BY AUTOMATED COUNT: 14.1 % (ref 11.9–14.6)
GLUCOSE UR STRIP.AUTO-MCNC: NEGATIVE MG/DL
HCT VFR BLD AUTO: 39.7 % (ref 41.1–50.3)
HGB BLD-MCNC: 12.5 G/DL (ref 13.6–17.2)
HGB UR QL STRIP: NEGATIVE
IMM GRANULOCYTES # BLD AUTO: 0 K/UL (ref 0–0.5)
IMM GRANULOCYTES NFR BLD AUTO: 0 % (ref 0–5)
KETONES UR QL STRIP.AUTO: ABNORMAL MG/DL
LEUKOCYTE ESTERASE UR QL STRIP.AUTO: ABNORMAL
LYMPHOCYTES # BLD: 1.1 K/UL (ref 0.5–4.6)
LYMPHOCYTES NFR BLD: 14 % (ref 13–44)
MCH RBC QN AUTO: 30 PG (ref 26.1–32.9)
MCHC RBC AUTO-ENTMCNC: 31.5 G/DL (ref 31.4–35)
MCV RBC AUTO: 95.4 FL (ref 82–102)
MONOCYTES # BLD: 0.6 K/UL (ref 0.1–1.3)
MONOCYTES NFR BLD: 7 % (ref 4–12)
NEUTS SEG # BLD: 6.2 K/UL (ref 1.7–8.2)
NEUTS SEG NFR BLD: 76 % (ref 43–78)
NITRITE UR QL STRIP.AUTO: NEGATIVE
NRBC # BLD: 0 K/UL (ref 0–0.2)
OTHER OBSERVATIONS: ABNORMAL
PH UR STRIP: 5 (ref 5–9)
PLATELET # BLD AUTO: 162 K/UL (ref 150–450)
PMV BLD AUTO: 12.6 FL (ref 9.4–12.3)
PROT UR STRIP-MCNC: ABNORMAL MG/DL
RBC # BLD AUTO: 4.16 M/UL (ref 4.23–5.6)
SP GR UR REFRACTOMETRY: 1.02 (ref 1–1.02)
UROBILINOGEN UR QL STRIP.AUTO: 1 EU/DL (ref 0.2–1)
WBC # BLD AUTO: 8.1 K/UL (ref 4.3–11.1)
WBC URNS QL MICRO: ABNORMAL /HPF

## 2023-11-09 PROCEDURE — G8420 CALC BMI NORM PARAMETERS: HCPCS | Performed by: INTERNAL MEDICINE

## 2023-11-09 PROCEDURE — G8427 DOCREV CUR MEDS BY ELIG CLIN: HCPCS | Performed by: INTERNAL MEDICINE

## 2023-11-09 PROCEDURE — 99213 OFFICE O/P EST LOW 20 MIN: CPT | Performed by: INTERNAL MEDICINE

## 2023-11-09 PROCEDURE — G8484 FLU IMMUNIZE NO ADMIN: HCPCS | Performed by: INTERNAL MEDICINE

## 2023-11-09 PROCEDURE — G0439 PPPS, SUBSEQ VISIT: HCPCS | Performed by: INTERNAL MEDICINE

## 2023-11-09 PROCEDURE — 1123F ACP DISCUSS/DSCN MKR DOCD: CPT | Performed by: INTERNAL MEDICINE

## 2023-11-09 PROCEDURE — 1036F TOBACCO NON-USER: CPT | Performed by: INTERNAL MEDICINE

## 2023-11-09 PROCEDURE — G0008 ADMIN INFLUENZA VIRUS VAC: HCPCS | Performed by: INTERNAL MEDICINE

## 2023-11-09 PROCEDURE — 90694 VACC AIIV4 NO PRSRV 0.5ML IM: CPT | Performed by: INTERNAL MEDICINE

## 2023-11-09 ASSESSMENT — PATIENT HEALTH QUESTIONNAIRE - PHQ9
SUM OF ALL RESPONSES TO PHQ9 QUESTIONS 1 & 2: 0
SUM OF ALL RESPONSES TO PHQ QUESTIONS 1-9: 0
SUM OF ALL RESPONSES TO PHQ QUESTIONS 1-9: 0
1. LITTLE INTEREST OR PLEASURE IN DOING THINGS: 0
SUM OF ALL RESPONSES TO PHQ QUESTIONS 1-9: 0
SUM OF ALL RESPONSES TO PHQ QUESTIONS 1-9: 0
2. FEELING DOWN, DEPRESSED OR HOPELESS: 0

## 2023-11-09 NOTE — PROGRESS NOTES
Medicare Annual Wellness Visit    Kennedy Salamanca is here for Medicare AWV (subsequent), 6 Month Follow-Up (Pt presents to the office today for a 6 month follow-up/), and Urinary Frequency (Having frequency)    Assessment & Plan   Needs flu shot  -     Influenza, FLUAD, (age 72 y+), IM, Preservative Free, 0.5 mL  Coronary artery disease involving native coronary artery of native heart without angina pectoris  Parkinson's disease with dyskinesia without fluctuating manifestations  Obstructive sleep apnea syndrome  Paroxysmal atrial fibrillation (HCC)  Slow transit constipation  Parkinson's disease without dyskinesia or fluctuating manifestations  Stage 3a chronic kidney disease ([address])  Prostate cancer ([address])  Dysuria  -     Urinalysis; Future  -     Culture, Urine; Future  Chronic fatigue  -     Comprehensive Metabolic Panel; Future  -     CBC with Auto Differential; Future  -     TSH; Future  -     Cortisol, Baseline; Future  Orthostatic hypotension  Medicare annual wellness visit, subsequent    Recommendations for Preventive Services Due: see orders and patient instructions/AVS.  Recommended screening schedule for the next 5-10 years is provided to the patient in written form: see Patient Instructions/AVS.     Return in about 3 months (around 2/9/2024). Subjective   The following acute and/or chronic problems were also addressed today:   Diagnosis Orders   1. Needs flu shot  Influenza, FLUAD, (age 72 y+), IM, Preservative Free, 0.5 mL      2. Coronary artery disease involving native coronary artery of native heart without angina pectoris        3. Parkinson's disease with dyskinesia without fluctuating manifestations        4. Obstructive sleep apnea syndrome        5. Paroxysmal atrial fibrillation (HCC)        6. Slow transit constipation        7. Parkinson's disease without dyskinesia or fluctuating manifestations        8. Stage 3a chronic kidney disease ([address])        9. Prostate cancer ([address])        10.

## 2023-11-09 NOTE — PROGRESS NOTES
11/9/2023 3:16 PM  Location:[address] INTERNAL MEDICINE  [place]  Patient #:  [id number]  YOB: 1940          YOUR LAST HEMOGLOBIN A1CS:   No results found for: \"HBA1C\", \"[id number]\"    YOUR LAST LIPID PROFILE:   Lab Results   Component Value Date/Time    CHOL 155 02/09/2022 10:40 AM    HDL 58 02/09/2022 10:40 AM    VLDL 13 02/09/2022 10:40 AM         Lab Results   Component Value Date/Time    GFRAA >60 08/08/2022 11:18 AM    BUN 42 05/09/2023 02:33 PM    NAPOC 141 08/10/2020 12:43 PM     05/09/2023 02:33 PM    K 4.7 05/09/2023 02:33 PM     05/09/2023 02:33 PM    CO2 26 05/09/2023 02:33 PM           History of Present Illness     Chief Complaint   Patient presents with    Medicare AWV     subsequent    6 Month Follow-Up     Pt presents to the office today for a 6 month follow-up      Urinary Frequency     Having frequency     This patient states he is having urinary frequency. He has had urinary tract infections in the past.  He feels that he is worsening with his Parkinson symptoms overall. No medication changes have been made. He is very immobile according to family.   No falls have occurred however  Mr. Farhan Dave is a 79 y.o. 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

## 2023-11-10 ENCOUNTER — TELEPHONE (OUTPATIENT)
Dept: INTERNAL MEDICINE CLINIC | Facility: CLINIC | Age: 83
End: 2023-11-10

## 2023-11-10 DIAGNOSIS — R73.9 HYPERGLYCEMIA: ICD-10-CM

## 2023-11-10 DIAGNOSIS — E87.1 HYPONATREMIA: Primary | ICD-10-CM

## 2023-11-10 LAB
ALBUMIN SERPL-MCNC: 3.6 G/DL (ref 3.2–4.6)
ALBUMIN/GLOB SERPL: 1 (ref 0.4–1.6)
ALP SERPL-CCNC: 75 U/L (ref 50–136)
ALT SERPL-CCNC: 8 U/L (ref 12–65)
ANION GAP SERPL CALC-SCNC: 9 MMOL/L (ref 2–11)
AST SERPL-CCNC: 9 U/L (ref 15–37)
BILIRUB SERPL-MCNC: 0.4 MG/DL (ref 0.2–1.1)
BUN SERPL-MCNC: 47 MG/DL (ref 8–23)
CALCIUM SERPL-MCNC: 9.1 MG/DL (ref 8.3–10.4)
CHLORIDE SERPL-SCNC: 106 MMOL/L (ref 101–110)
CO2 SERPL-SCNC: 24 MMOL/L (ref 21–32)
CREAT SERPL-MCNC: 1.6 MG/DL (ref 0.8–1.5)
GLOBULIN SER CALC-MCNC: 3.6 G/DL (ref 2.8–4.5)
GLUCOSE SERPL-MCNC: 161 MG/DL (ref 65–100)
POTASSIUM SERPL-SCNC: 4.6 MMOL/L (ref 3.5–5.1)
PROT SERPL-MCNC: 7.2 G/DL (ref 6.3–8.2)
SODIUM SERPL-SCNC: 139 MMOL/L (ref 133–143)
TSH, 3RD GENERATION: 0.76 UIU/ML (ref 0.36–3.74)

## 2023-11-10 NOTE — TELEPHONE ENCOUNTER
This has been fully explained to the patient, who indicates understanding.   Lab appt scheduled 12/7/2023 at 230 pm

## 2023-11-10 NOTE — TELEPHONE ENCOUNTER
----- Message from Damaso Castro MD sent at 11/10/2023  7:00 AM EST -----  Please notify patient that their lab results show he appears to be dehydrated, no evidence of obvious UTI, drink more fluids daily, repeat labs in 1 mo , ordered cms

## 2023-11-12 LAB
BACTERIA SPEC CULT: NORMAL
BACTERIA SPEC CULT: NORMAL
SERVICE CMNT-IMP: NORMAL

## 2023-12-07 ENCOUNTER — NURSE ONLY (OUTPATIENT)
Dept: INTERNAL MEDICINE CLINIC | Facility: CLINIC | Age: 83
End: 2023-12-07

## 2023-12-07 DIAGNOSIS — E87.1 HYPONATREMIA: ICD-10-CM

## 2023-12-07 DIAGNOSIS — R73.9 HYPERGLYCEMIA: ICD-10-CM

## 2023-12-07 LAB
ANION GAP SERPL CALC-SCNC: 10 MMOL/L (ref 2–11)
BUN SERPL-MCNC: 33 MG/DL (ref 8–23)
CALCIUM SERPL-MCNC: 9.1 MG/DL (ref 8.3–10.4)
CHLORIDE SERPL-SCNC: 107 MMOL/L (ref 103–113)
CO2 SERPL-SCNC: 24 MMOL/L (ref 21–32)
CREAT SERPL-MCNC: 1.2 MG/DL (ref 0.8–1.5)
EST. AVERAGE GLUCOSE BLD GHB EST-MCNC: 103 MG/DL
GLUCOSE SERPL-MCNC: 116 MG/DL (ref 65–100)
HBA1C MFR BLD: 5.2 % (ref 4.8–5.6)
POTASSIUM SERPL-SCNC: 4.4 MMOL/L (ref 3.5–5.1)
SODIUM SERPL-SCNC: 141 MMOL/L (ref 136–146)

## 2023-12-19 PROBLEM — G20.A1 PARKINSON'S DISEASE: Status: ACTIVE | Noted: 2023-12-19

## 2024-01-22 ENCOUNTER — OFFICE VISIT (OUTPATIENT)
Age: 84
End: 2024-01-22

## 2024-01-22 VITALS
WEIGHT: 178 LBS | HEIGHT: 72 IN | BODY MASS INDEX: 24.11 KG/M2 | SYSTOLIC BLOOD PRESSURE: 132 MMHG | HEART RATE: 125 BPM | DIASTOLIC BLOOD PRESSURE: 78 MMHG

## 2024-01-22 DIAGNOSIS — I25.10 CORONARY ARTERY DISEASE INVOLVING NATIVE CORONARY ARTERY OF NATIVE HEART WITHOUT ANGINA PECTORIS: ICD-10-CM

## 2024-01-22 DIAGNOSIS — I48.0 PAROXYSMAL ATRIAL FIBRILLATION (HCC): Primary | ICD-10-CM

## 2024-01-22 DIAGNOSIS — N18.31 STAGE 3A CHRONIC KIDNEY DISEASE (HCC): ICD-10-CM

## 2024-01-22 NOTE — PROGRESS NOTES
bruits bilaterally  Cardiovascular:   tachy, no murmur/rub/gallop appreciated  Pulmonary:   clear to auscultation bilaterally, no respiratory distress  Abdomen:   soft, non-tender, non-distended  Ext:   No sig LE edema bilaterally  Skin:  warm and dry, no obvious rashes seen  Neuro:   no obvious sensory or motor deficits  Psychiatric:   normal mood and affect    EKG Today and independently reviewed by me: sinus rhythm/tach, normal intervals and non-specific ST/T wave changes.    Lab Results   Component Value Date/Time     12/07/2023 02:29 PM    K 4.4 12/07/2023 02:29 PM     12/07/2023 02:29 PM    CO2 24 12/07/2023 02:29 PM    BUN 33 12/07/2023 02:29 PM    CREATININE 1.20 12/07/2023 02:29 PM    GLUCOSE 116 12/07/2023 02:29 PM    CALCIUM 9.1 12/07/2023 02:29 PM        Lab Results   Component Value Date    WBC 8.1 11/09/2023    HGB 12.5 (L) 11/09/2023    HCT 39.7 (L) 11/09/2023    MCV 95.4 11/09/2023     11/09/2023       Lab Results   Component Value Date    TSH 1.270 10/23/2020       Lab Results   Component Value Date    LABA1C 5.2 12/07/2023     Lab Results   Component Value Date     12/07/2023       Lab Results   Component Value Date    CHOL 155 02/09/2022    CHOL 169 02/25/2021     Lab Results   Component Value Date    TRIG 65 02/09/2022    TRIG 74 02/25/2021     Lab Results   Component Value Date    HDL 58 02/09/2022    HDL 60 02/25/2021     Lab Results   Component Value Date    LDLCALC 84 02/09/2022    LDLCALC 95 02/25/2021     Lab Results   Component Value Date    VLDL 13 02/09/2022    VLDL 14 02/25/2021     No results found for: \"CHOLHDLRATIO\"      I have Independently reviewed prior care notes, any ER records available, cardiac testing, labs and results with the patient and before seeing the patient today.  Also independently reviewed outside records when available.       ASSESSMENT:    Zacarias was seen today for atrial fibrillation.    Diagnoses and all orders for this

## 2024-02-13 DIAGNOSIS — G20.A1 PARKINSON'S DISEASE: ICD-10-CM

## 2024-02-15 RX ORDER — CYANOCOBALAMIN 1000 UG/ML
1000 INJECTION, SOLUTION INTRAMUSCULAR; SUBCUTANEOUS
Qty: 3 ML | Refills: 3 | Status: SHIPPED | OUTPATIENT
Start: 2024-02-15

## 2024-03-14 DIAGNOSIS — R30.0 DYSURIA: ICD-10-CM

## 2024-03-15 ENCOUNTER — TELEPHONE (OUTPATIENT)
Dept: INTERNAL MEDICINE CLINIC | Facility: CLINIC | Age: 84
End: 2024-03-15

## 2024-03-15 ENCOUNTER — OFFICE VISIT (OUTPATIENT)
Dept: INTERNAL MEDICINE CLINIC | Facility: CLINIC | Age: 84
End: 2024-03-15
Payer: MEDICARE

## 2024-03-15 VITALS
OXYGEN SATURATION: 96 % | RESPIRATION RATE: 16 BRPM | DIASTOLIC BLOOD PRESSURE: 69 MMHG | HEIGHT: 72 IN | BODY MASS INDEX: 24.68 KG/M2 | SYSTOLIC BLOOD PRESSURE: 117 MMHG | TEMPERATURE: 97.1 F | WEIGHT: 182.2 LBS | HEART RATE: 122 BPM

## 2024-03-15 DIAGNOSIS — C61 PROSTATE CANCER (HCC): ICD-10-CM

## 2024-03-15 DIAGNOSIS — Z23 NEED FOR PROPHYLACTIC VACCINATION AGAINST STREPTOCOCCUS PNEUMONIAE (PNEUMOCOCCUS): ICD-10-CM

## 2024-03-15 DIAGNOSIS — R11.0 NAUSEA: ICD-10-CM

## 2024-03-15 DIAGNOSIS — G20.A1 DEMENTIA ASSOCIATED WITH PARKINSON'S DISEASE (HCC): ICD-10-CM

## 2024-03-15 DIAGNOSIS — I48.0 PAROXYSMAL ATRIAL FIBRILLATION (HCC): Primary | ICD-10-CM

## 2024-03-15 DIAGNOSIS — F02.80 DEMENTIA ASSOCIATED WITH PARKINSON'S DISEASE (HCC): ICD-10-CM

## 2024-03-15 DIAGNOSIS — G20.A1 PARKINSON'S DISEASE WITHOUT DYSKINESIA OR FLUCTUATING MANIFESTATIONS: ICD-10-CM

## 2024-03-15 DIAGNOSIS — N18.31 STAGE 3A CHRONIC KIDNEY DISEASE (HCC): ICD-10-CM

## 2024-03-15 DIAGNOSIS — G47.33 OBSTRUCTIVE SLEEP APNEA SYNDROME: ICD-10-CM

## 2024-03-15 PROCEDURE — G8427 DOCREV CUR MEDS BY ELIG CLIN: HCPCS | Performed by: INTERNAL MEDICINE

## 2024-03-15 PROCEDURE — 99213 OFFICE O/P EST LOW 20 MIN: CPT | Performed by: INTERNAL MEDICINE

## 2024-03-15 PROCEDURE — 1123F ACP DISCUSS/DSCN MKR DOCD: CPT | Performed by: INTERNAL MEDICINE

## 2024-03-15 PROCEDURE — 90677 PCV20 VACCINE IM: CPT | Performed by: INTERNAL MEDICINE

## 2024-03-15 PROCEDURE — 1036F TOBACCO NON-USER: CPT | Performed by: INTERNAL MEDICINE

## 2024-03-15 PROCEDURE — G0009 ADMIN PNEUMOCOCCAL VACCINE: HCPCS | Performed by: INTERNAL MEDICINE

## 2024-03-15 PROCEDURE — G8484 FLU IMMUNIZE NO ADMIN: HCPCS | Performed by: INTERNAL MEDICINE

## 2024-03-15 PROCEDURE — G8420 CALC BMI NORM PARAMETERS: HCPCS | Performed by: INTERNAL MEDICINE

## 2024-03-15 RX ORDER — NYSTATIN 100000 U/G
OINTMENT TOPICAL
Qty: 30 G | Refills: 3 | Status: SHIPPED | OUTPATIENT
Start: 2024-03-15

## 2024-03-15 RX ORDER — FAMOTIDINE 20 MG/1
20 TABLET, FILM COATED ORAL NIGHTLY PRN
Qty: 90 TABLET | Refills: 3 | Status: SHIPPED | OUTPATIENT
Start: 2024-03-15

## 2024-03-15 ASSESSMENT — PATIENT HEALTH QUESTIONNAIRE - PHQ9
1. LITTLE INTEREST OR PLEASURE IN DOING THINGS: 0
SUM OF ALL RESPONSES TO PHQ QUESTIONS 1-9: 0
SUM OF ALL RESPONSES TO PHQ9 QUESTIONS 1 & 2: 0
SUM OF ALL RESPONSES TO PHQ QUESTIONS 1-9: 0
SUM OF ALL RESPONSES TO PHQ QUESTIONS 1-9: 0
2. FEELING DOWN, DEPRESSED OR HOPELESS: 0
SUM OF ALL RESPONSES TO PHQ QUESTIONS 1-9: 0

## 2024-03-15 NOTE — PROGRESS NOTES
3/15/2024 3:15 PM  Location:[facility] PHYSICIAN SERVICES  [facility] [place] [place]ICINE  [place]  Patient #:  [id number]  YOB: 1940          YOUR LAST HEMOGLOBIN A1CS:   No results found for: \"HBA1C\", \"[id number]\"    YOUR LAST LIPID PROFILE:   Lab Results   Component Value Date/Time    CHOL 155 2022 10:40 AM    HDL 58 2022 10:40 AM    VLDL 13 2022 10:40 AM         Lab Results   Component Value Date/Time    GFRAA >60 2022 11:18 AM    BUN 33 2023 02:29 PM    NAPOC 141 08/10/2020 12:43 PM     2023 02:29 PM    K 4.4 2023 02:29 PM     2023 02:29 PM    CO2 24 2023 02:29 PM           History of Present Illness     Chief Complaint   Patient presents with   • 3 Month Follow-Up     Pt presents to the office today for a 3 month follow-up       This patient says he is generally weak and he has had no recent falls, as part concerns is unchanged overall.      Mr. Olsen is a 83 y.o. 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

## 2024-03-15 NOTE — TELEPHONE ENCOUNTER
This pt is in office today with HR of 125, regular, similar to his ov with you in Jan. Looks like a regular tachycardia on your ECG, he now agrees to start meds for this. Not sure if we should add low dose beta blocker or diltiazem, your call, thanks cms

## 2024-03-19 RX ORDER — DILTIAZEM HYDROCHLORIDE 120 MG/1
120 CAPSULE, COATED, EXTENDED RELEASE ORAL DAILY
Qty: 30 CAPSULE | Refills: 3 | Status: SHIPPED | OUTPATIENT
Start: 2024-03-19

## 2024-03-19 NOTE — TELEPHONE ENCOUNTER
I notified pt.of MD response and he v/u.Med escribed as below and fu appt.made for EKG and BP check next week.  Requested Prescriptions     Signed Prescriptions Disp Refills    dilTIAZem (CARDIZEM CD) 120 MG extended release capsule 30 capsule 3     Sig: Take 1 capsule by mouth daily     Authorizing Provider: EDGAR ZHU     Ordering User: ERIN LYONS

## 2024-03-19 NOTE — TELEPHONE ENCOUNTER
Please call him, start dilt 120 daily in AM, needs nurse visit with EKG and BP check in 10 days.  Have him call for issues on new med.   See me as planned.   Thanks

## 2024-03-21 ENCOUNTER — OFFICE VISIT (OUTPATIENT)
Dept: NEUROLOGY | Age: 84
End: 2024-03-21

## 2024-03-21 VITALS
DIASTOLIC BLOOD PRESSURE: 68 MMHG | HEART RATE: 126 BPM | WEIGHT: 183.2 LBS | SYSTOLIC BLOOD PRESSURE: 104 MMHG | HEIGHT: 72 IN | OXYGEN SATURATION: 90 % | BODY MASS INDEX: 24.81 KG/M2

## 2024-03-21 DIAGNOSIS — G62.9 PERIPHERAL POLYNEUROPATHY: ICD-10-CM

## 2024-03-21 DIAGNOSIS — R26.9 GAIT DISTURBANCE: ICD-10-CM

## 2024-03-21 DIAGNOSIS — F02.80 DEMENTIA ASSOCIATED WITH PARKINSON'S DISEASE (HCC): ICD-10-CM

## 2024-03-21 DIAGNOSIS — R49.8 HYPOPHONIA: ICD-10-CM

## 2024-03-21 DIAGNOSIS — G20.A1 PARKINSON'S DISEASE WITHOUT DYSKINESIA OR FLUCTUATING MANIFESTATIONS (HCC): Primary | ICD-10-CM

## 2024-03-21 DIAGNOSIS — R13.12 OROPHARYNGEAL DYSPHAGIA: ICD-10-CM

## 2024-03-21 DIAGNOSIS — G20.A1 DEMENTIA ASSOCIATED WITH PARKINSON'S DISEASE (HCC): ICD-10-CM

## 2024-03-21 RX ORDER — MEMANTINE HYDROCHLORIDE 5 MG/1
5 TABLET ORAL 2 TIMES DAILY
Qty: 60 TABLET | Refills: 5 | Status: SHIPPED | OUTPATIENT
Start: 2024-03-21

## 2024-03-21 RX ORDER — DONEPEZIL HYDROCHLORIDE 10 MG/1
10 TABLET, FILM COATED ORAL NIGHTLY
Qty: 30 TABLET | Refills: 5 | Status: SHIPPED | OUTPATIENT
Start: 2024-03-21

## 2024-03-21 RX ORDER — CARBIDOPA AND LEVODOPA 50; 200 MG/1; MG/1
2 TABLET, EXTENDED RELEASE ORAL NIGHTLY
Qty: 60 TABLET | Refills: 5 | Status: SHIPPED | OUTPATIENT
Start: 2024-03-21

## 2024-03-21 ASSESSMENT — PATIENT HEALTH QUESTIONNAIRE - PHQ9
2. FEELING DOWN, DEPRESSED OR HOPELESS: NOT AT ALL
SUM OF ALL RESPONSES TO PHQ QUESTIONS 1-9: 0
SUM OF ALL RESPONSES TO PHQ9 QUESTIONS 1 & 2: 0
SUM OF ALL RESPONSES TO PHQ QUESTIONS 1-9: 0
1. LITTLE INTEREST OR PLEASURE IN DOING THINGS: NOT AT ALL

## 2024-03-21 NOTE — PROGRESS NOTES
[facility]  [address], [address]  [place] [address]  Phone: [phone] Fax [phone]  LICO Acuña        Patient: Zacarias Olsen  Provider: LICO Acuña    CC:   Chief Complaint   Patient presents with    New Patient    Other     New to Provider       Referring Provider: Vishal Valera MD  PCP: Samuel Khan MD    History of Present Illness:     Interval History on 3/21/2024:  Zacarias Olsen is a 83 y.o. male with PMH as below, who presents for follow-up of Parkinson's disease.     Today, the patient arrived to the office ambulating with the assistance of a cane and is accompanied for his visit today. Denies any recent falls.    Since last office visit, the patient reports:  -Started Sinemet CR  mg 1 tablet in the evening for 1 week and then increase to 2 tablets in the evening  -Successfully reached goal dose  -Tolerated the medication without difficulty or adverse effects and with noticeable improvement  -Improved mobility getting up during the night and sleeping better    -Declining cognitive function highlighted by impaired short-term memory loss, slowed processing speed  -Has continued to take donepezil 10 mg at night  -Tolerated trial of memantine 5 mg 1 tablet twice daily without difficulty or adverse effects  -No noticeable changes, but no worsening    -On average, sleeping has improved; has decreased daytime napping  -History of obstructive sleep apnea  -Not compliant with BiPAP machine  -Encouraged to discuss referral to sleep medicine with PCP   -No history of RBD  -Visual hallucinations particularly during nighttime hours  -Occasional hallucinations, but not bothersome at this time    -NCS previously conducted and showed evidence of generalized polyneuropathy  -No history of neuropathic pain    Relevant Medications as of 3/21/2024:   Current Relevant Medications:   Sinemet  mg 3 tablets 3 times a day (8am, 1pm,

## 2024-05-28 ENCOUNTER — TELEPHONE (OUTPATIENT)
Dept: INTERNAL MEDICINE CLINIC | Facility: CLINIC | Age: 84
End: 2024-05-28

## 2024-05-28 NOTE — TELEPHONE ENCOUNTER
Patient's Daughter called stated wound opened up on left foot and has had a blot clot in the past. He has bad circulation and this has happened before.[phone]

## 2024-05-30 ENCOUNTER — OFFICE VISIT (OUTPATIENT)
Dept: INTERNAL MEDICINE CLINIC | Facility: CLINIC | Age: 84
End: 2024-05-30
Payer: MEDICARE

## 2024-05-30 VITALS
BODY MASS INDEX: 24.03 KG/M2 | DIASTOLIC BLOOD PRESSURE: 63 MMHG | HEART RATE: 82 BPM | SYSTOLIC BLOOD PRESSURE: 118 MMHG | WEIGHT: 177.4 LBS | TEMPERATURE: 97.2 F | RESPIRATION RATE: 16 BRPM | HEIGHT: 72 IN

## 2024-05-30 DIAGNOSIS — I25.10 CORONARY ARTERY DISEASE INVOLVING NATIVE CORONARY ARTERY OF NATIVE HEART WITHOUT ANGINA PECTORIS: Primary | ICD-10-CM

## 2024-05-30 DIAGNOSIS — R73.9 HYPERGLYCEMIA: ICD-10-CM

## 2024-05-30 DIAGNOSIS — L97.322 SKIN ULCER OF LEFT ANKLE WITH FAT LAYER EXPOSED (HCC): ICD-10-CM

## 2024-05-30 DIAGNOSIS — N39.0 URINARY TRACT INFECTION WITHOUT HEMATURIA, SITE UNSPECIFIED: ICD-10-CM

## 2024-05-30 DIAGNOSIS — G20.A1 PARKINSON'S DISEASE WITHOUT DYSKINESIA OR FLUCTUATING MANIFESTATIONS (HCC): ICD-10-CM

## 2024-05-30 LAB
ANION GAP SERPL CALC-SCNC: 14 MMOL/L (ref 9–18)
APPEARANCE UR: CLEAR
BASOPHILS # BLD: 0.1 K/UL (ref 0–0.2)
BASOPHILS NFR BLD: 1 % (ref 0–2)
BILIRUB UR QL: NEGATIVE
BUN SERPL-MCNC: 43 MG/DL (ref 8–23)
CALCIUM SERPL-MCNC: 9.4 MG/DL (ref 8.8–10.2)
CHLORIDE SERPL-SCNC: 100 MMOL/L (ref 98–107)
CO2 SERPL-SCNC: 26 MMOL/L (ref 20–28)
COLOR UR: ABNORMAL
CREAT SERPL-MCNC: 1.28 MG/DL (ref 0.8–1.3)
DIFFERENTIAL METHOD BLD: ABNORMAL
EOSINOPHIL # BLD: 0.2 K/UL (ref 0–0.8)
EOSINOPHIL NFR BLD: 2 % (ref 0.5–7.8)
ERYTHROCYTE [DISTWIDTH] IN BLOOD BY AUTOMATED COUNT: 14.2 % (ref 11.9–14.6)
EST. AVERAGE GLUCOSE BLD GHB EST-MCNC: 113 MG/DL
GLUCOSE SERPL-MCNC: 92 MG/DL (ref 70–99)
GLUCOSE UR STRIP.AUTO-MCNC: NEGATIVE MG/DL
HBA1C MFR BLD: 5.6 % (ref 0–5.6)
HCT VFR BLD AUTO: 42 % (ref 41.1–50.3)
HGB BLD-MCNC: 12.7 G/DL (ref 13.6–17.2)
HGB UR QL STRIP: NEGATIVE
IMM GRANULOCYTES # BLD AUTO: 0 K/UL (ref 0–0.5)
IMM GRANULOCYTES NFR BLD AUTO: 0 % (ref 0–5)
KETONES UR QL STRIP.AUTO: ABNORMAL MG/DL
LEUKOCYTE ESTERASE UR QL STRIP.AUTO: NEGATIVE
LYMPHOCYTES # BLD: 1.4 K/UL (ref 0.5–4.6)
LYMPHOCYTES NFR BLD: 12 % (ref 13–44)
MCH RBC QN AUTO: 29.5 PG (ref 26.1–32.9)
MCHC RBC AUTO-ENTMCNC: 30.2 G/DL (ref 31.4–35)
MCV RBC AUTO: 97.7 FL (ref 82–102)
MONOCYTES # BLD: 1.1 K/UL (ref 0.1–1.3)
MONOCYTES NFR BLD: 10 % (ref 4–12)
NEUTS SEG # BLD: 8.6 K/UL (ref 1.7–8.2)
NEUTS SEG NFR BLD: 75 % (ref 43–78)
NITRITE UR QL STRIP.AUTO: NEGATIVE
NRBC # BLD: 0 K/UL (ref 0–0.2)
PH UR STRIP: 5 (ref 5–9)
PLATELET # BLD AUTO: 199 K/UL (ref 150–450)
PMV BLD AUTO: 12.5 FL (ref 9.4–12.3)
POTASSIUM SERPL-SCNC: 4.1 MMOL/L (ref 3.5–5.1)
PROT UR STRIP-MCNC: NEGATIVE MG/DL
RBC # BLD AUTO: 4.3 M/UL (ref 4.23–5.6)
SODIUM SERPL-SCNC: 139 MMOL/L (ref 136–145)
SP GR UR REFRACTOMETRY: 1.02 (ref 1–1.02)
UROBILINOGEN UR QL STRIP.AUTO: 1 EU/DL (ref 0.2–1)
WBC # BLD AUTO: 11.4 K/UL (ref 4.3–11.1)

## 2024-05-30 PROCEDURE — G8420 CALC BMI NORM PARAMETERS: HCPCS | Performed by: INTERNAL MEDICINE

## 2024-05-30 PROCEDURE — 99214 OFFICE O/P EST MOD 30 MIN: CPT | Performed by: INTERNAL MEDICINE

## 2024-05-30 PROCEDURE — 1036F TOBACCO NON-USER: CPT | Performed by: INTERNAL MEDICINE

## 2024-05-30 PROCEDURE — 1123F ACP DISCUSS/DSCN MKR DOCD: CPT | Performed by: INTERNAL MEDICINE

## 2024-05-30 PROCEDURE — G8427 DOCREV CUR MEDS BY ELIG CLIN: HCPCS | Performed by: INTERNAL MEDICINE

## 2024-05-30 RX ORDER — CEPHALEXIN 500 MG/1
500 CAPSULE ORAL 3 TIMES DAILY
Qty: 21 CAPSULE | Refills: 0 | Status: SHIPPED | OUTPATIENT
Start: 2024-05-30

## 2024-05-30 NOTE — PROGRESS NOTES
Urine  - Urinalysis    4. Skin ulcer of left ankle with fat layer exposed (HCC)  Significant on exam stage III will redress today start antibiotics for what appears to be cellulitis around the wound.  He will be referred to wound care and home health  - CBC with Auto Differential; Future  - External Referral to Wound Care  - cephALEXin (KEFLEX) 500 MG capsule; Take 1 capsule by mouth 3 times daily  Dispense: 21 capsule; Refill: 0  - CBC with Auto Differential    5. Hyperglycemia     - Basic Metabolic Panel; Future  - Hemoglobin A1C; Future  - Hemoglobin A1C  - Basic Metabolic Panel      Return in about 4 weeks (around 6/27/2024).         Samuel Khan MD

## 2024-05-31 DIAGNOSIS — G20.A1 DEMENTIA ASSOCIATED WITH PARKINSON'S DISEASE (HCC): ICD-10-CM

## 2024-05-31 DIAGNOSIS — F02.80 DEMENTIA ASSOCIATED WITH PARKINSON'S DISEASE (HCC): ICD-10-CM

## 2024-05-31 RX ORDER — MEMANTINE HYDROCHLORIDE 5 MG/1
5 TABLET ORAL 2 TIMES DAILY
Qty: 180 TABLET | Refills: 2 | OUTPATIENT
Start: 2024-05-31

## 2024-06-02 LAB
BACTERIA SPEC CULT: NORMAL
SERVICE CMNT-IMP: NORMAL

## 2024-06-05 ENCOUNTER — TELEPHONE (OUTPATIENT)
Dept: INTERNAL MEDICINE CLINIC | Facility: CLINIC | Age: 84
End: 2024-06-05

## 2024-06-05 NOTE — TELEPHONE ENCOUNTER
HOME HEALTH NURSE CALL      Name of the Nurse Calling and Facility:   [facility]      Best Contact Number to Reach the Nurse:   [phone]          Reason For Call:   Addmitted to home health for wound care and med managment

## 2024-06-25 ENCOUNTER — OFFICE VISIT (OUTPATIENT)
Age: 84
End: 2024-06-25
Payer: MEDICARE

## 2024-06-25 VITALS
DIASTOLIC BLOOD PRESSURE: 70 MMHG | WEIGHT: 181 LBS | HEIGHT: 75 IN | HEART RATE: 80 BPM | BODY MASS INDEX: 22.5 KG/M2 | SYSTOLIC BLOOD PRESSURE: 126 MMHG

## 2024-06-25 DIAGNOSIS — I25.10 CORONARY ARTERY DISEASE INVOLVING NATIVE CORONARY ARTERY OF NATIVE HEART WITHOUT ANGINA PECTORIS: ICD-10-CM

## 2024-06-25 DIAGNOSIS — N18.31 STAGE 3A CHRONIC KIDNEY DISEASE (HCC): ICD-10-CM

## 2024-06-25 DIAGNOSIS — I48.0 PAROXYSMAL ATRIAL FIBRILLATION (HCC): Primary | ICD-10-CM

## 2024-06-25 PROCEDURE — 99214 OFFICE O/P EST MOD 30 MIN: CPT | Performed by: INTERNAL MEDICINE

## 2024-06-25 PROCEDURE — 1123F ACP DISCUSS/DSCN MKR DOCD: CPT | Performed by: INTERNAL MEDICINE

## 2024-06-25 PROCEDURE — 1036F TOBACCO NON-USER: CPT | Performed by: INTERNAL MEDICINE

## 2024-06-25 PROCEDURE — G8427 DOCREV CUR MEDS BY ELIG CLIN: HCPCS | Performed by: INTERNAL MEDICINE

## 2024-06-25 PROCEDURE — G8420 CALC BMI NORM PARAMETERS: HCPCS | Performed by: INTERNAL MEDICINE

## 2024-06-25 NOTE — PROGRESS NOTES
[address] [facility], [place] [address]  PHONE: [phone]     24    NAME:  Zacarias Olsen  : 1940  MRN: [id number]       SUBJECTIVE:   Zacarias Olsen is a 83 y.o. male seen for a follow up visit regarding the following:     Chief Complaint   Patient presents with    Atrial Fibrillation       HPI:   Here for CAD.    AILEEN off BiPAP.    8/10/2020: CABG X 2 with LIMA to LAD and reverse SVG to the OM.    Post-op Afib and low BPs.     Echo 2021: normal EF.      Dx with Parkinson's.  Falling at times now.  Balance not great.  Using walker now, some nausea now.  No CP, pressure.  Some CAMPBELL, some SOB.  Not as active.  Parkinson's worsening now.    No CP, using cane.      Patient denies recent history of orthopnea, PND, excessive dizziness and/or syncope.         Past Medical History, Past Surgical History, Family history, Social History, and Medications were all reviewed with the patient today and updated as necessary.     Current Outpatient Medications   Medication Sig Dispense Refill    cephALEXin (KEFLEX) 500 MG capsule Take 1 capsule by mouth 3 times daily 21 capsule 0    donepezil (ARICEPT) 10 MG tablet Take 1 tablet by mouth nightly 30 tablet 5    memantine (NAMENDA) 5 MG tablet Take 1 tablet by mouth 2 times daily 60 tablet 5    carbidopa-levodopa (SINEMET CR)  MG per extended release tablet Take 2 tablets by mouth nightly 60 tablet 5    dilTIAZem (CARDIZEM CD) 120 MG extended release capsule Take 1 capsule by mouth daily 30 capsule 3    nystatin (MYCOSTATIN) 349709 UNIT/GM ointment APPLY TO AFFECTED AREA TWICE A DAY 30 g 3    famotidine (PEPCID) 20 MG tablet Take 1 tablet by mouth nightly as needed (nausea) 90 tablet 3    cyanocobalamin 1000 MCG/ML injection INJECT 1 ML INTO THE MUSCLE EVERY 30 DAYS 3 mL 3    carbidopa-levodopa (SINEMET)  MG per tablet Take 3 tablets by mouth 3 times daily 810 tablet 3    nitroGLYCERIN (NITROSTAT) 0.4 MG SL tablet Place 1 tablet

## 2024-07-01 ENCOUNTER — TELEPHONE (OUTPATIENT)
Dept: INTERNAL MEDICINE CLINIC | Facility: CLINIC | Age: 84
End: 2024-07-01

## 2024-07-01 ENCOUNTER — OFFICE VISIT (OUTPATIENT)
Dept: INTERNAL MEDICINE CLINIC | Facility: CLINIC | Age: 84
End: 2024-07-01
Payer: MEDICARE

## 2024-07-01 VITALS
RESPIRATION RATE: 16 BRPM | TEMPERATURE: 97.2 F | DIASTOLIC BLOOD PRESSURE: 64 MMHG | WEIGHT: 180 LBS | HEIGHT: 75 IN | HEART RATE: 75 BPM | BODY MASS INDEX: 22.38 KG/M2 | SYSTOLIC BLOOD PRESSURE: 127 MMHG

## 2024-07-01 DIAGNOSIS — R25.1 TREMORS OF NERVOUS SYSTEM: ICD-10-CM

## 2024-07-01 DIAGNOSIS — R11.0 NAUSEA: ICD-10-CM

## 2024-07-01 DIAGNOSIS — G90.3 NEUROGENIC ORTHOSTATIC HYPOTENSION (HCC): Primary | ICD-10-CM

## 2024-07-01 LAB
ALBUMIN SERPL-MCNC: 3.4 G/DL (ref 3.2–4.6)
ALBUMIN/GLOB SERPL: 0.9 (ref 1–1.9)
ALP SERPL-CCNC: 78 U/L (ref 40–129)
ALT SERPL-CCNC: <5 U/L (ref 12–65)
ANION GAP SERPL CALC-SCNC: 11 MMOL/L (ref 9–18)
AST SERPL-CCNC: 17 U/L (ref 15–37)
BASOPHILS # BLD: 0.1 K/UL (ref 0–0.2)
BASOPHILS NFR BLD: 1 % (ref 0–2)
BILIRUB SERPL-MCNC: 0.3 MG/DL (ref 0–1.2)
BUN SERPL-MCNC: 50 MG/DL (ref 8–23)
CALCIUM SERPL-MCNC: 9.1 MG/DL (ref 8.8–10.2)
CHLORIDE SERPL-SCNC: 104 MMOL/L (ref 98–107)
CO2 SERPL-SCNC: 24 MMOL/L (ref 20–28)
CREAT SERPL-MCNC: 1.49 MG/DL (ref 0.8–1.3)
DIFFERENTIAL METHOD BLD: ABNORMAL
EOSINOPHIL # BLD: 0.2 K/UL (ref 0–0.8)
EOSINOPHIL NFR BLD: 3 % (ref 0.5–7.8)
ERYTHROCYTE [DISTWIDTH] IN BLOOD BY AUTOMATED COUNT: 14.8 % (ref 11.9–14.6)
GLOBULIN SER CALC-MCNC: 3.6 G/DL (ref 2.3–3.5)
GLUCOSE SERPL-MCNC: 126 MG/DL (ref 70–99)
HCT VFR BLD AUTO: 39.3 % (ref 41.1–50.3)
HGB BLD-MCNC: 12.1 G/DL (ref 13.6–17.2)
IMM GRANULOCYTES # BLD AUTO: 0 K/UL (ref 0–0.5)
IMM GRANULOCYTES NFR BLD AUTO: 0 % (ref 0–5)
LYMPHOCYTES # BLD: 0.8 K/UL (ref 0.5–4.6)
LYMPHOCYTES NFR BLD: 12 % (ref 13–44)
MAGNESIUM SERPL-MCNC: 2 MG/DL (ref 1.8–2.4)
MCH RBC QN AUTO: 29.4 PG (ref 26.1–32.9)
MCHC RBC AUTO-ENTMCNC: 30.8 G/DL (ref 31.4–35)
MCV RBC AUTO: 95.4 FL (ref 82–102)
MONOCYTES # BLD: 0.6 K/UL (ref 0.1–1.3)
MONOCYTES NFR BLD: 9 % (ref 4–12)
NEUTS SEG # BLD: 5.2 K/UL (ref 1.7–8.2)
NEUTS SEG NFR BLD: 75 % (ref 43–78)
NRBC # BLD: 0 K/UL (ref 0–0.2)
PLATELET # BLD AUTO: 165 K/UL (ref 150–450)
PMV BLD AUTO: 12.3 FL (ref 9.4–12.3)
POTASSIUM SERPL-SCNC: 4.5 MMOL/L (ref 3.5–5.1)
PROT SERPL-MCNC: 7.1 G/DL (ref 6.3–8.2)
RBC # BLD AUTO: 4.12 M/UL (ref 4.23–5.6)
SODIUM SERPL-SCNC: 140 MMOL/L (ref 136–145)
TSH W FREE THYROID IF ABNORMAL: 1.28 UIU/ML (ref 0.27–4.2)
WBC # BLD AUTO: 6.9 K/UL (ref 4.3–11.1)

## 2024-07-01 PROCEDURE — G8427 DOCREV CUR MEDS BY ELIG CLIN: HCPCS | Performed by: NURSE PRACTITIONER

## 2024-07-01 PROCEDURE — G8420 CALC BMI NORM PARAMETERS: HCPCS | Performed by: NURSE PRACTITIONER

## 2024-07-01 PROCEDURE — 1036F TOBACCO NON-USER: CPT | Performed by: NURSE PRACTITIONER

## 2024-07-01 PROCEDURE — 1123F ACP DISCUSS/DSCN MKR DOCD: CPT | Performed by: NURSE PRACTITIONER

## 2024-07-01 PROCEDURE — 99214 OFFICE O/P EST MOD 30 MIN: CPT | Performed by: NURSE PRACTITIONER

## 2024-07-01 RX ORDER — ONDANSETRON 4 MG/1
4 TABLET, ORALLY DISINTEGRATING ORAL 3 TIMES DAILY PRN
Qty: 30 TABLET | Refills: 0 | Status: SHIPPED | OUTPATIENT
Start: 2024-07-01

## 2024-07-01 SDOH — ECONOMIC STABILITY: INCOME INSECURITY: HOW HARD IS IT FOR YOU TO PAY FOR THE VERY BASICS LIKE FOOD, HOUSING, MEDICAL CARE, AND HEATING?: NOT VERY HARD

## 2024-07-01 SDOH — ECONOMIC STABILITY: FOOD INSECURITY: WITHIN THE PAST 12 MONTHS, THE FOOD YOU BOUGHT JUST DIDN'T LAST AND YOU DIDN'T HAVE MONEY TO GET MORE.: NEVER TRUE

## 2024-07-01 SDOH — ECONOMIC STABILITY: FOOD INSECURITY: WITHIN THE PAST 12 MONTHS, YOU WORRIED THAT YOUR FOOD WOULD RUN OUT BEFORE YOU GOT MONEY TO BUY MORE.: NEVER TRUE

## 2024-07-01 ASSESSMENT — ENCOUNTER SYMPTOMS
CONSTIPATION: 1
ABDOMINAL PAIN: 0
DIARRHEA: 0
NAUSEA: 1
SHORTNESS OF BREATH: 1

## 2024-07-01 NOTE — PROGRESS NOTES
7/1/2024 2:36 PM  Location:[facility] PHYSICIAN SERVICES  [facility] [place]  [place]  Patient #:  [id number]  YOB: 1940          YOUR LAST HEMOGLOBIN A1CS:   No results found for: \"HBA1C\", \"[id number]\"    YOUR LAST LIPID PROFILE:   Lab Results   Component Value Date/Time    CHOL 155 02/09/2022 10:40 AM    HDL 58 02/09/2022 10:40 AM    LDL 84 02/09/2022 10:40 AM    VLDL 13 02/09/2022 10:40 AM         Lab Results   Component Value Date/Time    GFRAA >60 08/08/2022 11:18 AM    BUN 43 05/30/2024 12:34 PM    NAPOC 141 08/10/2020 12:43 PM     05/30/2024 12:34 PM    K 4.1 05/30/2024 12:34 PM     05/30/2024 12:34 PM    CO2 26 05/30/2024 12:34 PM           History of Present Illness     Chief Complaint   Patient presents with    4 Week Follow-up     Pt presents to the office today for a 4 week follow-up.  Wlound on the     Nausea     Feel nauseated all the time. Denies having any stomach pain, cramping or diarrhea .       Mr. Olsen is a 83 y.o. male  who presents for the above mentioned complaints.  Mr. Olsen presents for 4-week follow-up.  He was seen by Dr. Khan last month and had a wound on his left ankle, was referred to wound care.  He was recently seen at wound care on June 24, notes as below:  Plan:    The patient was seen and examined. History and physical and pertinent records reviewed.   Start Marsha Ag and Hydrofera Blue to the left ankle wound. Start 20 to 30 mmHg compression wraps for edema control.   I would like to order a standing venous duplex to evaluate any reflux in his left lower extremity.   Follow-up in approximately 2 weeks.     Labs showed leukocytosis, urine was negative for infection as he was having some dysuria.    History is significant for atrial fibrillation not on DOAC therapy, CAD, AILEEN, constipation, Alzheimer's, Parkinson's, CKD 3, prostate cancer.  Takes Pepcid as needed for nausea.    Reports 1 month or greater of weakness, nausea with

## 2024-07-01 NOTE — TELEPHONE ENCOUNTER
Dr. Valera,  I saw our mutual patient today, .   Has been having about a month of nausea when he stands up.  He was orthostatic from sitting to standing.  I am checking labs to rule out any sort of metabolic cause.  Not sure if the Sinemet may be causing this or just the Parkinson's itself.  You are seeing him in 2 days, let me know your thoughts or if you would like me to do anything else.  Thanks for all you do!  MARKELL Xavier  [facility] Internal Medicine

## 2024-07-02 ENCOUNTER — TELEPHONE (OUTPATIENT)
Dept: INTERNAL MEDICINE CLINIC | Facility: CLINIC | Age: 84
End: 2024-07-02

## 2024-07-02 DIAGNOSIS — D50.9 IRON DEFICIENCY ANEMIA, UNSPECIFIED IRON DEFICIENCY ANEMIA TYPE: Primary | ICD-10-CM

## 2024-07-02 RX ORDER — DILTIAZEM HYDROCHLORIDE 120 MG/1
120 CAPSULE, COATED, EXTENDED RELEASE ORAL DAILY
Qty: 30 CAPSULE | Refills: 11 | Status: SHIPPED | OUTPATIENT
Start: 2024-07-02

## 2024-07-02 NOTE — TELEPHONE ENCOUNTER
Sent my chart message as below, please make sure he receives.   Needs FIT test up front.   Thanks!  Katina        Mr. Black,    The labs show chronic dehydration and some declined kidney function. Making sure you are hydrating enough to turn your urine  a pale yellow color may help with the drop in your blood pressure when you stand and the symptoms you are experiencing.      The other labs show mild anemia. Let us know if you develop any dark stools and I would like for you to come  a FIT test from the office and fill this out. This is a test you do at home and bring back to the office, tests fir any microscopic blood in your stool.   Please let us know if you develop  any new or concerning symptoms.   Here if you need us!  MARKELL Xavier  [facility] Internal Medicine

## 2024-07-02 NOTE — TELEPHONE ENCOUNTER
Thanks for the heads up! Will reassess at his follow up tomorrow.       Vishal Llanos MD  [facility]  [address], [address] [address]  Ph: [phone]  Fax: [phone]

## 2024-07-02 NOTE — TELEPHONE ENCOUNTER
Med continued 6/25/24 ov escribed as below:  Requested Prescriptions     Signed Prescriptions Disp Refills    dilTIAZem (CARDIZEM CD) 120 MG extended release capsule 30 capsule 11     Sig: Take 1 capsule by mouth daily     Authorizing Provider: EDGAR ZHU     Ordering User: ERIN LYONS

## 2024-07-03 ENCOUNTER — OFFICE VISIT (OUTPATIENT)
Dept: NEUROLOGY | Age: 84
End: 2024-07-03
Payer: MEDICARE

## 2024-07-03 VITALS
SYSTOLIC BLOOD PRESSURE: 137 MMHG | HEIGHT: 75 IN | BODY MASS INDEX: 22.5 KG/M2 | HEART RATE: 67 BPM | WEIGHT: 181 LBS | DIASTOLIC BLOOD PRESSURE: 70 MMHG

## 2024-07-03 DIAGNOSIS — R26.9 GAIT DISTURBANCE: ICD-10-CM

## 2024-07-03 DIAGNOSIS — G20.A1 DEMENTIA ASSOCIATED WITH PARKINSON'S DISEASE (HCC): ICD-10-CM

## 2024-07-03 DIAGNOSIS — G20.A1 PARKINSON'S DISEASE WITHOUT DYSKINESIA OR FLUCTUATING MANIFESTATIONS (HCC): Primary | ICD-10-CM

## 2024-07-03 DIAGNOSIS — G62.9 PERIPHERAL POLYNEUROPATHY: ICD-10-CM

## 2024-07-03 DIAGNOSIS — R49.8 HYPOPHONIA: ICD-10-CM

## 2024-07-03 DIAGNOSIS — F02.80 DEMENTIA ASSOCIATED WITH PARKINSON'S DISEASE (HCC): ICD-10-CM

## 2024-07-03 PROCEDURE — G8427 DOCREV CUR MEDS BY ELIG CLIN: HCPCS | Performed by: PSYCHIATRY & NEUROLOGY

## 2024-07-03 PROCEDURE — G8420 CALC BMI NORM PARAMETERS: HCPCS | Performed by: PSYCHIATRY & NEUROLOGY

## 2024-07-03 PROCEDURE — 1123F ACP DISCUSS/DSCN MKR DOCD: CPT | Performed by: PSYCHIATRY & NEUROLOGY

## 2024-07-03 PROCEDURE — 99215 OFFICE O/P EST HI 40 MIN: CPT | Performed by: PSYCHIATRY & NEUROLOGY

## 2024-07-03 PROCEDURE — 1036F TOBACCO NON-USER: CPT | Performed by: PSYCHIATRY & NEUROLOGY

## 2024-07-03 RX ORDER — MEMANTINE HYDROCHLORIDE 10 MG/1
10 TABLET ORAL 2 TIMES DAILY
Qty: 60 TABLET | Refills: 5 | Status: SHIPPED | OUTPATIENT
Start: 2024-07-03

## 2024-07-03 NOTE — PROGRESS NOTES
[facility] Neurology   [address], [address]  [place] [address]  Phone: [phone] Fax [phone]  Provider: Vishal Valera MD    Patient: Zacarias Olsen  Date: 2/12/2021    Subjective     Chief Complaint   Patient presents with    Follow-up     Parkinson's Disease     Zacarias Olsen is a 80 y.o. male who presents for follow up of Parkinson's disease.     He is accompanied by his daughter.  He was last seen December 2023.    Patient presents for follow-up and continued management of Parkinson's disease complicated by tremor of the R>L hands, micrographia, gait disturbance, and chronic constipation.  He also has a length dependent generalized polyneuropathy confirmed on nerve conduction studies.    Current medications include:  Sinemet  mg 3 tablets 3 times a day  Sinemet CR  mg 2 tablets at night  Donepezil 10 mg at night  Memantine 5 mg twice a day    Previous medication trials include: N/A    Patient presents today for follow-up.    He continues to have significant mobility impairment with a slowed and shuffling gait, requiring use of a front wheel walker for assistance.  There is chronic balance difficulty with high risk of falls.  He has a wheelchair that he uses for longer distances.    He unfortunately has had some recent issues with orthostatic hypotension as well.  He also notes a sensation of nausea particularly when standing and ambulating for any period of time.  Recent prescription for Zofran from his primary care physician is noted.    Increases in dopaminergic therapy have been without any apparent benefit.  He endorses no perception of motor fluctuations, short duration responses, or dyskinesias.  Denies any obvious adverse effects.    There is a loss of speech volume but he continues to deny any significant dysphagia.  No sialorrhea.    There continues to be decline in cognitive function with short-term memory loss, slowed cognitive processing, and executive

## 2024-07-09 LAB
HEMOCCULT STL QL: NEGATIVE
VALID INTERNAL CONTROL: YES

## 2024-08-02 ENCOUNTER — TELEPHONE (OUTPATIENT)
Dept: INTERNAL MEDICINE CLINIC | Facility: CLINIC | Age: 84
End: 2024-08-02

## 2024-08-02 NOTE — TELEPHONE ENCOUNTER
Tasha [facility] health is calling for verbal orders to recert home health.  Seeing twice a week for wound care.

## 2024-08-13 ENCOUNTER — OFFICE VISIT (OUTPATIENT)
Dept: INTERNAL MEDICINE CLINIC | Facility: CLINIC | Age: 84
End: 2024-08-13
Payer: MEDICARE

## 2024-08-13 VITALS
HEART RATE: 81 BPM | TEMPERATURE: 97.2 F | BODY MASS INDEX: 23.38 KG/M2 | SYSTOLIC BLOOD PRESSURE: 126 MMHG | DIASTOLIC BLOOD PRESSURE: 66 MMHG | HEIGHT: 75 IN | RESPIRATION RATE: 16 BRPM | WEIGHT: 188 LBS

## 2024-08-13 DIAGNOSIS — G20.A1 PARKINSON'S DISEASE WITHOUT DYSKINESIA OR FLUCTUATING MANIFESTATIONS (HCC): ICD-10-CM

## 2024-08-13 DIAGNOSIS — R68.89 OTHER GENERAL SYMPTOMS AND SIGNS: ICD-10-CM

## 2024-08-13 DIAGNOSIS — G30.9 ALZHEIMER'S DISEASE, UNSPECIFIED (CODE) (HCC): ICD-10-CM

## 2024-08-13 DIAGNOSIS — R53.82 CHRONIC FATIGUE: ICD-10-CM

## 2024-08-13 DIAGNOSIS — I48.0 PAROXYSMAL ATRIAL FIBRILLATION (HCC): Primary | ICD-10-CM

## 2024-08-13 DIAGNOSIS — D50.9 IRON DEFICIENCY ANEMIA, UNSPECIFIED IRON DEFICIENCY ANEMIA TYPE: ICD-10-CM

## 2024-08-13 DIAGNOSIS — I25.10 CORONARY ARTERY DISEASE INVOLVING NATIVE CORONARY ARTERY OF NATIVE HEART WITHOUT ANGINA PECTORIS: ICD-10-CM

## 2024-08-13 DIAGNOSIS — M89.9 LESION OF BONE OF RIGHT HAND: ICD-10-CM

## 2024-08-13 LAB
ANION GAP SERPL CALC-SCNC: 11 MMOL/L (ref 9–18)
BASOPHILS # BLD: 0.1 K/UL (ref 0–0.2)
BASOPHILS NFR BLD: 1 % (ref 0–2)
BUN SERPL-MCNC: 48 MG/DL (ref 8–23)
CALCIUM SERPL-MCNC: 9 MG/DL (ref 8.8–10.2)
CHLORIDE SERPL-SCNC: 101 MMOL/L (ref 98–107)
CO2 SERPL-SCNC: 25 MMOL/L (ref 20–28)
CREAT SERPL-MCNC: 1.28 MG/DL (ref 0.8–1.3)
DIFFERENTIAL METHOD BLD: ABNORMAL
EOSINOPHIL # BLD: 0.2 K/UL (ref 0–0.8)
EOSINOPHIL NFR BLD: 2 % (ref 0.5–7.8)
ERYTHROCYTE [DISTWIDTH] IN BLOOD BY AUTOMATED COUNT: 14.6 % (ref 11.9–14.6)
FERRITIN SERPL-MCNC: 318 NG/ML (ref 8–388)
GLUCOSE SERPL-MCNC: 123 MG/DL (ref 70–99)
HCT VFR BLD AUTO: 39.1 % (ref 41.1–50.3)
HGB BLD-MCNC: 12.2 G/DL (ref 13.6–17.2)
IMM GRANULOCYTES # BLD AUTO: 0 K/UL (ref 0–0.5)
IMM GRANULOCYTES NFR BLD AUTO: 0 % (ref 0–5)
LYMPHOCYTES # BLD: 0.7 K/UL (ref 0.5–4.6)
LYMPHOCYTES NFR BLD: 10 % (ref 13–44)
MCH RBC QN AUTO: 29.5 PG (ref 26.1–32.9)
MCHC RBC AUTO-ENTMCNC: 31.2 G/DL (ref 31.4–35)
MCV RBC AUTO: 94.7 FL (ref 82–102)
MONOCYTES # BLD: 0.5 K/UL (ref 0.1–1.3)
MONOCYTES NFR BLD: 7 % (ref 4–12)
NEUTS SEG # BLD: 5.9 K/UL (ref 1.7–8.2)
NEUTS SEG NFR BLD: 80 % (ref 43–78)
NRBC # BLD: 0 K/UL (ref 0–0.2)
PLATELET # BLD AUTO: 177 K/UL (ref 150–450)
PMV BLD AUTO: 11.6 FL (ref 9.4–12.3)
POTASSIUM SERPL-SCNC: 5.1 MMOL/L (ref 3.5–5.1)
RBC # BLD AUTO: 4.13 M/UL (ref 4.23–5.6)
SODIUM SERPL-SCNC: 137 MMOL/L (ref 136–145)
VIT B12 SERPL-MCNC: 812 PG/ML (ref 193–986)
WBC # BLD AUTO: 7.4 K/UL (ref 4.3–11.1)

## 2024-08-13 PROCEDURE — 1123F ACP DISCUSS/DSCN MKR DOCD: CPT | Performed by: INTERNAL MEDICINE

## 2024-08-13 PROCEDURE — G8427 DOCREV CUR MEDS BY ELIG CLIN: HCPCS | Performed by: INTERNAL MEDICINE

## 2024-08-13 PROCEDURE — 1036F TOBACCO NON-USER: CPT | Performed by: INTERNAL MEDICINE

## 2024-08-13 PROCEDURE — G8420 CALC BMI NORM PARAMETERS: HCPCS | Performed by: INTERNAL MEDICINE

## 2024-08-13 PROCEDURE — G2211 COMPLEX E/M VISIT ADD ON: HCPCS | Performed by: INTERNAL MEDICINE

## 2024-08-13 PROCEDURE — 99214 OFFICE O/P EST MOD 30 MIN: CPT | Performed by: INTERNAL MEDICINE

## 2024-08-13 NOTE — PROGRESS NOTES
8/13/2024 2:25 PM  Location:[facility] PHYSICIAN SERVICES  [facility] INTERNAL MEDICINE  [place]  Patient #:  [id number]  YOB: 1940          YOUR LAST HEMOGLOBIN A1CS:   No results found for: \"HBA1C\", \"WYX3MIIN\"    YOUR LAST LIPID PROFILE:   Lab Results   Component Value Date/Time    CHOL 155 02/09/2022 10:40 AM    HDL 58 02/09/2022 10:40 AM    LDL 84 02/09/2022 10:40 AM    VLDL 13 02/09/2022 10:40 AM         Lab Results   Component Value Date/Time    GFRAA >60 08/08/2022 11:18 AM    BUN 50 07/01/2024 03:19 PM    NAPOC 141 08/10/2020 12:43 PM     07/01/2024 03:19 PM    K 4.5 07/01/2024 03:19 PM     07/01/2024 03:19 PM    CO2 24 07/01/2024 03:19 PM           History of Present Illness     Chief Complaint   Patient presents with    6 Month Follow-Up     Pt presents to the office today for a 6 month follow-up      Constipation     Having issues with constipation    Nausea     Stay nauseated all the time and weak     This patient continues to have constipation issues stating he takes a stool softener as needed.  He feels he is drinking adequate fluids.  Nausea continues to be problematic but he is concerned that Zofran may interact with his Parkinson's meds.  He is followed by neurology routinely for Parkinson's disease.  Sinemet dose has been decreased since his last visit here.  No real change with memory issues noted.  Blood pressures have been controlled at home they have noted no falls or syncope  Mr. Olsen is a 83 y.o. 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

## 2024-09-26 DIAGNOSIS — G20.A1 DEMENTIA ASSOCIATED WITH PARKINSON'S DISEASE (HCC): ICD-10-CM

## 2024-09-26 DIAGNOSIS — F02.80 DEMENTIA ASSOCIATED WITH PARKINSON'S DISEASE (HCC): ICD-10-CM

## 2024-09-26 RX ORDER — DONEPEZIL HYDROCHLORIDE 10 MG/1
10 TABLET, FILM COATED ORAL NIGHTLY
Qty: 90 TABLET | Refills: 1 | Status: SHIPPED | OUTPATIENT
Start: 2024-09-26

## 2025-01-29 RX ORDER — CYANOCOBALAMIN 1000 UG/ML
1000 INJECTION, SOLUTION INTRAMUSCULAR; SUBCUTANEOUS
Qty: 3 ML | Refills: 3 | Status: SHIPPED | OUTPATIENT
Start: 2025-01-29

## 2025-02-13 ENCOUNTER — OFFICE VISIT (OUTPATIENT)
Dept: INTERNAL MEDICINE CLINIC | Facility: CLINIC | Age: 85
End: 2025-02-13

## 2025-02-13 VITALS
WEIGHT: 208.6 LBS | RESPIRATION RATE: 16 BRPM | SYSTOLIC BLOOD PRESSURE: 131 MMHG | BODY MASS INDEX: 25.94 KG/M2 | TEMPERATURE: 97.4 F | HEIGHT: 75 IN | OXYGEN SATURATION: 96 % | DIASTOLIC BLOOD PRESSURE: 67 MMHG | HEART RATE: 83 BPM

## 2025-02-13 DIAGNOSIS — I48.0 PAROXYSMAL ATRIAL FIBRILLATION (HCC): ICD-10-CM

## 2025-02-13 DIAGNOSIS — R30.0 DYSURIA: ICD-10-CM

## 2025-02-13 DIAGNOSIS — R35.0 URINARY FREQUENCY: ICD-10-CM

## 2025-02-13 DIAGNOSIS — G20.A1 PARKINSON'S DISEASE WITHOUT FLUCTUATING MANIFESTATIONS, UNSPECIFIED WHETHER DYSKINESIA PRESENT (HCC): ICD-10-CM

## 2025-02-13 DIAGNOSIS — R30.0 DYSURIA: Primary | ICD-10-CM

## 2025-02-13 DIAGNOSIS — R53.82 CHRONIC FATIGUE: ICD-10-CM

## 2025-02-13 DIAGNOSIS — Z71.89 ACP (ADVANCE CARE PLANNING): ICD-10-CM

## 2025-02-13 DIAGNOSIS — G30.9 ALZHEIMER'S DISEASE, UNSPECIFIED (CODE) (HCC): ICD-10-CM

## 2025-02-13 DIAGNOSIS — N18.31 STAGE 3A CHRONIC KIDNEY DISEASE (HCC): ICD-10-CM

## 2025-02-13 DIAGNOSIS — Z00.00 MEDICARE ANNUAL WELLNESS VISIT, SUBSEQUENT: ICD-10-CM

## 2025-02-13 DIAGNOSIS — G20.A1 PARKINSON'S DISEASE WITHOUT DYSKINESIA OR FLUCTUATING MANIFESTATIONS (HCC): ICD-10-CM

## 2025-02-13 DIAGNOSIS — C61 PROSTATE CANCER (HCC): ICD-10-CM

## 2025-02-13 DIAGNOSIS — I25.10 CORONARY ARTERY DISEASE INVOLVING NATIVE CORONARY ARTERY OF NATIVE HEART WITHOUT ANGINA PECTORIS: ICD-10-CM

## 2025-02-13 DIAGNOSIS — R73.9 HYPERGLYCEMIA: ICD-10-CM

## 2025-02-13 LAB
ALBUMIN SERPL-MCNC: 3.4 G/DL (ref 3.2–4.6)
ALBUMIN/GLOB SERPL: 0.9 (ref 1–1.9)
ALP SERPL-CCNC: 79 U/L (ref 40–129)
ALT SERPL-CCNC: 6 U/L (ref 8–55)
ANION GAP SERPL CALC-SCNC: 13 MMOL/L (ref 7–16)
APPEARANCE UR: CLEAR
AST SERPL-CCNC: 18 U/L (ref 15–37)
BASOPHILS # BLD: 0.05 K/UL (ref 0–0.2)
BASOPHILS NFR BLD: 0.7 % (ref 0–2)
BILIRUB SERPL-MCNC: 0.3 MG/DL (ref 0–1.2)
BILIRUB UR QL: NEGATIVE
BUN SERPL-MCNC: 41 MG/DL (ref 8–23)
CALCIUM SERPL-MCNC: 9 MG/DL (ref 8.8–10.2)
CHLORIDE SERPL-SCNC: 103 MMOL/L (ref 98–107)
CO2 SERPL-SCNC: 23 MMOL/L (ref 20–29)
COLOR UR: ABNORMAL
CREAT SERPL-MCNC: 1.35 MG/DL (ref 0.8–1.3)
DIFFERENTIAL METHOD BLD: ABNORMAL
EOSINOPHIL # BLD: 0.16 K/UL (ref 0–0.8)
EOSINOPHIL NFR BLD: 2.3 % (ref 0.5–7.8)
ERYTHROCYTE [DISTWIDTH] IN BLOOD BY AUTOMATED COUNT: 14.2 % (ref 11.9–14.6)
GLOBULIN SER CALC-MCNC: 3.9 G/DL (ref 2.3–3.5)
GLUCOSE SERPL-MCNC: 121 MG/DL (ref 70–99)
GLUCOSE UR STRIP.AUTO-MCNC: NEGATIVE MG/DL
HCT VFR BLD AUTO: 38.3 % (ref 41.1–50.3)
HGB BLD-MCNC: 12.3 G/DL (ref 13.6–17.2)
HGB UR QL STRIP: NEGATIVE
IMM GRANULOCYTES # BLD AUTO: 0.02 K/UL (ref 0–0.5)
IMM GRANULOCYTES NFR BLD AUTO: 0.3 % (ref 0–5)
KETONES UR QL STRIP.AUTO: ABNORMAL MG/DL
LEUKOCYTE ESTERASE UR QL STRIP.AUTO: NEGATIVE
LYMPHOCYTES # BLD: 0.8 K/UL (ref 0.5–4.6)
LYMPHOCYTES NFR BLD: 11.6 % (ref 13–44)
MCH RBC QN AUTO: 29.2 PG (ref 26.1–32.9)
MCHC RBC AUTO-ENTMCNC: 32.1 G/DL (ref 31.4–35)
MCV RBC AUTO: 91 FL (ref 82–102)
MONOCYTES # BLD: 0.56 K/UL (ref 0.1–1.3)
MONOCYTES NFR BLD: 8.1 % (ref 4–12)
NEUTS SEG # BLD: 5.3 K/UL (ref 1.7–8.2)
NEUTS SEG NFR BLD: 77 % (ref 43–78)
NITRITE UR QL STRIP.AUTO: NEGATIVE
NRBC # BLD: 0 K/UL (ref 0–0.2)
PH UR STRIP: 5 (ref 5–9)
PLATELET # BLD AUTO: 167 K/UL (ref 150–450)
PMV BLD AUTO: 11.9 FL (ref 9.4–12.3)
POTASSIUM SERPL-SCNC: 4.3 MMOL/L (ref 3.5–5.1)
PROT SERPL-MCNC: 7.2 G/DL (ref 6.3–8.2)
PROT UR STRIP-MCNC: NEGATIVE MG/DL
RBC # BLD AUTO: 4.21 M/UL (ref 4.23–5.6)
SODIUM SERPL-SCNC: 139 MMOL/L (ref 136–145)
SP GR UR REFRACTOMETRY: 1.02 (ref 1–1.02)
TSH, 3RD GENERATION: 1.04 UIU/ML (ref 0.27–4.2)
UROBILINOGEN UR QL STRIP.AUTO: 0.2 EU/DL (ref 0.2–1)
WBC # BLD AUTO: 6.9 K/UL (ref 4.3–11.1)

## 2025-02-13 RX ORDER — CARBIDOPA AND LEVODOPA 50; 200 MG/1; MG/1
2 TABLET, EXTENDED RELEASE ORAL NIGHTLY
Qty: 60 TABLET | Refills: 5 | Status: SHIPPED | OUTPATIENT
Start: 2025-02-13

## 2025-02-13 SDOH — ECONOMIC STABILITY: FOOD INSECURITY: WITHIN THE PAST 12 MONTHS, THE FOOD YOU BOUGHT JUST DIDN'T LAST AND YOU DIDN'T HAVE MONEY TO GET MORE.: NEVER TRUE

## 2025-02-13 SDOH — ECONOMIC STABILITY: FOOD INSECURITY: WITHIN THE PAST 12 MONTHS, YOU WORRIED THAT YOUR FOOD WOULD RUN OUT BEFORE YOU GOT MONEY TO BUY MORE.: NEVER TRUE

## 2025-02-13 ASSESSMENT — PATIENT HEALTH QUESTIONNAIRE - PHQ9
SUM OF ALL RESPONSES TO PHQ QUESTIONS 1-9: 0
SUM OF ALL RESPONSES TO PHQ QUESTIONS 1-9: 0
2. FEELING DOWN, DEPRESSED OR HOPELESS: NOT AT ALL
SUM OF ALL RESPONSES TO PHQ QUESTIONS 1-9: 0
1. LITTLE INTEREST OR PLEASURE IN DOING THINGS: NOT AT ALL
SUM OF ALL RESPONSES TO PHQ9 QUESTIONS 1 & 2: 0
SUM OF ALL RESPONSES TO PHQ QUESTIONS 1-9: 0

## 2025-02-13 NOTE — PROGRESS NOTES
of Times Moved in the Last Year: 0     Homeless in the Last Year: No     Past Surgical History:   Procedure Laterality Date    CARDIAC CATHETERIZATION  08/03/2020    CATARACT REMOVAL Bilateral     IOL    COLONOSCOPY      poplyp removed    CYST REMOVAL Left     shoulder    OTHER SURGICAL HISTORY      prostate radiation    ME APPENDECTOMY      TURP       Current Outpatient Medications   Medication Sig Dispense Refill    cyanocobalamin 1000 MCG/ML injection INJECT 1 ML INTO THE MUSCLE EVERY 30 DAYS 3 mL 3    donepezil (ARICEPT) 10 MG tablet TAKE 1 TABLET BY MOUTH EVERY DAY AT NIGHT 90 tablet 1    memantine (NAMENDA) 10 MG tablet Take 1 tablet by mouth 2 times daily 60 tablet 5    dilTIAZem (CARDIZEM CD) 120 MG extended release capsule Take 1 capsule by mouth daily 30 capsule 11    carbidopa-levodopa (SINEMET CR)  MG per extended release tablet Take 2 tablets by mouth nightly 60 tablet 5    nystatin (MYCOSTATIN) 536555 UNIT/GM ointment APPLY TO AFFECTED AREA TWICE A DAY 30 g 3    carbidopa-levodopa (SINEMET)  MG per tablet Take 3 tablets by mouth 3 times daily (Patient taking differently: Take 2 tablets by mouth 3 times daily) 810 tablet 3    nitroGLYCERIN (NITROSTAT) 0.4 MG SL tablet Place 1 tablet under the tongue every 5 minutes as needed for Chest pain up to max of 3 total doses. If no relief after 1 dose, call 911. 25 tablet 5    Multiple Vitamin (MULTIVITAMIN ADULT PO) Take 1 tablet by mouth daily      aspirin 81 MG EC tablet Take 1 tablet by mouth daily      ondansetron (ZOFRAN-ODT) 4 MG disintegrating tablet Take 1 tablet by mouth 3 times daily as needed for Nausea or Vomiting (Patient not taking: Reported on 8/13/2024) 30 tablet 0    famotidine (PEPCID) 20 MG tablet Take 1 tablet by mouth nightly as needed (nausea) (Patient not taking: Reported on 2/13/2025) 90 tablet 3     No current facility-administered medications for this visit.     Health Maintenance   Topic Date Due    DTaP/Tdap/Td vaccine (1

## 2025-02-15 LAB
BACTERIA SPEC CULT: NORMAL
SERVICE CMNT-IMP: NORMAL

## 2025-04-01 DIAGNOSIS — R11.0 NAUSEA: ICD-10-CM

## 2025-04-02 RX ORDER — FAMOTIDINE 20 MG/1
20 TABLET, FILM COATED ORAL NIGHTLY PRN
Qty: 90 TABLET | Refills: 3 | Status: SHIPPED | OUTPATIENT
Start: 2025-04-02 | End: 2025-04-03 | Stop reason: SDUPTHER

## 2025-04-02 NOTE — TELEPHONE ENCOUNTER
Pt's daughter wants to know if it would be ok for the pt to increase his Pepcid to twice a day as he is having stomach issues during the day as well as at night. If so, a new script will need to be sent to his pharmacy.    (She picked the refill up today and noticed that the directions had not been changed. Since, she didn't hear back from anyone, she wanted to make sure that the message had been seen).      Call Back# [phone]

## 2025-04-03 ENCOUNTER — PATIENT MESSAGE (OUTPATIENT)
Dept: INTERNAL MEDICINE CLINIC | Facility: CLINIC | Age: 85
End: 2025-04-03
Payer: MEDICARE

## 2025-04-03 DIAGNOSIS — D50.9 IRON DEFICIENCY ANEMIA, UNSPECIFIED IRON DEFICIENCY ANEMIA TYPE: Primary | ICD-10-CM

## 2025-04-03 PROCEDURE — G0328 FECAL BLOOD SCRN IMMUNOASSAY: HCPCS | Performed by: INTERNAL MEDICINE

## 2025-04-03 RX ORDER — FAMOTIDINE 20 MG/1
20 TABLET, FILM COATED ORAL 2 TIMES DAILY
Qty: 180 TABLET | Refills: 3 | Status: SHIPPED | OUTPATIENT
Start: 2025-04-03

## 2025-04-03 NOTE — TELEPHONE ENCOUNTER
Give the patient another stool card and make appt with GI at [facility] for bowel incontinence , melanotic stool cms

## 2025-04-04 NOTE — TELEPHONE ENCOUNTER
Pts daughter called checking on the status of this message. Pts daughter is very concerned about the apparent internal bleeding. Would like to know if she should go to ?     Pts daughter also stated her brother has been taking pts bp. States his BP has been dropping:     Wednesday (4/2): 126/74  Thursday (4/3): 112/65  Friday (4/4): 105/53    Pts daughter is afraid to let this continue through the weekend.

## 2025-04-07 NOTE — TELEPHONE ENCOUNTER
Called and spoke with pt's daughter, they did  the FIT kit.  Pt scheduled to see Katina on Wednesday at 320 pm

## 2025-04-09 ENCOUNTER — TELEPHONE (OUTPATIENT)
Dept: INTERNAL MEDICINE CLINIC | Facility: CLINIC | Age: 85
End: 2025-04-09

## 2025-04-09 ENCOUNTER — OFFICE VISIT (OUTPATIENT)
Dept: INTERNAL MEDICINE CLINIC | Facility: CLINIC | Age: 85
End: 2025-04-09
Payer: MEDICARE

## 2025-04-09 VITALS
DIASTOLIC BLOOD PRESSURE: 65 MMHG | OXYGEN SATURATION: 95 % | TEMPERATURE: 97.3 F | SYSTOLIC BLOOD PRESSURE: 129 MMHG | HEART RATE: 72 BPM | HEIGHT: 75 IN | WEIGHT: 208 LBS | BODY MASS INDEX: 25.86 KG/M2

## 2025-04-09 DIAGNOSIS — L98.492: ICD-10-CM

## 2025-04-09 DIAGNOSIS — R10.84 GENERALIZED ABDOMINAL PAIN: ICD-10-CM

## 2025-04-09 DIAGNOSIS — L97.322 SKIN ULCER OF LEFT ANKLE WITH FAT LAYER EXPOSED (HCC): ICD-10-CM

## 2025-04-09 DIAGNOSIS — K92.1 BLACK TARRY STOOLS: Primary | ICD-10-CM

## 2025-04-09 DIAGNOSIS — K92.1 BLACK TARRY STOOLS: ICD-10-CM

## 2025-04-09 LAB
ALBUMIN SERPL-MCNC: 3.3 G/DL (ref 3.2–4.6)
ALBUMIN/GLOB SERPL: 0.8 (ref 1–1.9)
ALP SERPL-CCNC: 89 U/L (ref 40–129)
ALT SERPL-CCNC: <5 U/L (ref 8–55)
ANION GAP SERPL CALC-SCNC: 10 MMOL/L (ref 7–16)
AST SERPL-CCNC: 19 U/L (ref 15–37)
BASOPHILS # BLD: 0.05 K/UL (ref 0–0.2)
BASOPHILS NFR BLD: 0.8 % (ref 0–2)
BILIRUB SERPL-MCNC: 0.4 MG/DL (ref 0–1.2)
BUN SERPL-MCNC: 27 MG/DL (ref 8–23)
CALCIUM SERPL-MCNC: 9.2 MG/DL (ref 8.8–10.2)
CHLORIDE SERPL-SCNC: 101 MMOL/L (ref 98–107)
CO2 SERPL-SCNC: 26 MMOL/L (ref 20–29)
CREAT SERPL-MCNC: 1.38 MG/DL (ref 0.8–1.3)
DIFFERENTIAL METHOD BLD: ABNORMAL
EOSINOPHIL # BLD: 0.09 K/UL (ref 0–0.8)
EOSINOPHIL NFR BLD: 1.4 % (ref 0.5–7.8)
ERYTHROCYTE [DISTWIDTH] IN BLOOD BY AUTOMATED COUNT: 14 % (ref 11.9–14.6)
GLOBULIN SER CALC-MCNC: 3.9 G/DL (ref 2.3–3.5)
GLUCOSE SERPL-MCNC: 109 MG/DL (ref 70–99)
HCT VFR BLD AUTO: 37.8 % (ref 41.1–50.3)
HEMOCCULT STL QL: NEGATIVE
HGB BLD-MCNC: 11.8 G/DL (ref 13.6–17.2)
IMM GRANULOCYTES # BLD AUTO: 0.01 K/UL (ref 0–0.5)
IMM GRANULOCYTES NFR BLD AUTO: 0.2 % (ref 0–5)
LYMPHOCYTES # BLD: 0.87 K/UL (ref 0.5–4.6)
LYMPHOCYTES NFR BLD: 13.6 % (ref 13–44)
MCH RBC QN AUTO: 28.9 PG (ref 26.1–32.9)
MCHC RBC AUTO-ENTMCNC: 31.2 G/DL (ref 31.4–35)
MCV RBC AUTO: 92.6 FL (ref 82–102)
MONOCYTES # BLD: 0.57 K/UL (ref 0.1–1.3)
MONOCYTES NFR BLD: 8.9 % (ref 4–12)
NEUTS SEG # BLD: 4.79 K/UL (ref 1.7–8.2)
NEUTS SEG NFR BLD: 75.1 % (ref 43–78)
NRBC # BLD: 0 K/UL (ref 0–0.2)
PLATELET # BLD AUTO: 136 K/UL (ref 150–450)
PMV BLD AUTO: 12.7 FL (ref 9.4–12.3)
POTASSIUM SERPL-SCNC: 4.5 MMOL/L (ref 3.5–5.1)
PROT SERPL-MCNC: 7.2 G/DL (ref 6.3–8.2)
RBC # BLD AUTO: 4.08 M/UL (ref 4.23–5.6)
SODIUM SERPL-SCNC: 137 MMOL/L (ref 136–145)
VALID INTERNAL CONTROL: YES
WBC # BLD AUTO: 6.4 K/UL (ref 4.3–11.1)

## 2025-04-09 PROCEDURE — G8427 DOCREV CUR MEDS BY ELIG CLIN: HCPCS | Performed by: NURSE PRACTITIONER

## 2025-04-09 PROCEDURE — G8419 CALC BMI OUT NRM PARAM NOF/U: HCPCS | Performed by: NURSE PRACTITIONER

## 2025-04-09 PROCEDURE — 1125F AMNT PAIN NOTED PAIN PRSNT: CPT | Performed by: NURSE PRACTITIONER

## 2025-04-09 PROCEDURE — 1123F ACP DISCUSS/DSCN MKR DOCD: CPT | Performed by: NURSE PRACTITIONER

## 2025-04-09 PROCEDURE — 1036F TOBACCO NON-USER: CPT | Performed by: NURSE PRACTITIONER

## 2025-04-09 PROCEDURE — 99214 OFFICE O/P EST MOD 30 MIN: CPT | Performed by: NURSE PRACTITIONER

## 2025-04-09 PROCEDURE — 1160F RVW MEDS BY RX/DR IN RCRD: CPT | Performed by: NURSE PRACTITIONER

## 2025-04-09 PROCEDURE — 1159F MED LIST DOCD IN RCRD: CPT | Performed by: NURSE PRACTITIONER

## 2025-04-09 ASSESSMENT — PATIENT HEALTH QUESTIONNAIRE - PHQ9
SUM OF ALL RESPONSES TO PHQ QUESTIONS 1-9: 0
1. LITTLE INTEREST OR PLEASURE IN DOING THINGS: NOT AT ALL
SUM OF ALL RESPONSES TO PHQ QUESTIONS 1-9: 0
SUM OF ALL RESPONSES TO PHQ QUESTIONS 1-9: 0
2. FEELING DOWN, DEPRESSED OR HOPELESS: NOT AT ALL
SUM OF ALL RESPONSES TO PHQ QUESTIONS 1-9: 0

## 2025-04-09 ASSESSMENT — ENCOUNTER SYMPTOMS
ABDOMINAL PAIN: 0
CONSTIPATION: 1
BLOOD IN STOOL: 1
ANAL BLEEDING: 0
VOMITING: 0
NAUSEA: 1
DIARRHEA: 0

## 2025-04-09 NOTE — PROGRESS NOTES
bleeding. He has been advised to discontinue the use of Pepto-Bismol and Mylanta to see if this clears up stool color.  Restart Pepcid, consider PPI if hemoglobin is lower.  A referral to a gastroenterologist will be made for further evaluation per family request.  Abdominal exam is soft and nontender with normal bowel sounds.  Blood work will be conducted today to assess his current hemoglobin levels.   Discussed presenting to the emergency department for any syncope, continued multiple episodes of melena with low blood pressures, hematemesis, fevers or development of abdominal pain, altered mentation.  [facility]  - CBC with Auto Differential; Future  - Comprehensive Metabolic Panel; Future    2. Corns and calluses.  He has been advised to soak his feet and use corn pads for relief.  Declines podiatry referral at this time.    3. Skin ulcer of left ankle with fat layer exposed (HCC)  He has been advised to consider wound center for further evaluation of poor healing wound on his leg. If he prefers, home health services can be arranged.  They will let me know.    PROCEDURE  The patient underwent open heart surgery in the past.    The patient (or guardian, if applicable) and other individuals in attendance with the patient were advised that Artificial Intelligence will be utilized during this visit to record, process the conversation to generate a clinical note, and support improvement of the AI technology. The patient (or guardian, if applicable) and other individuals in attendance at the appointment consented to the use of AI, including the recording.      Total time for encounter on day of encounter was 34 minutes.  This time includes chart prep, review of tests/procedures, review of other provider's notes, documentation and counseling patient regarding disease process and medications.         Sade Hale, APRN - CNP

## 2025-04-09 NOTE — TELEPHONE ENCOUNTER
Ms.Michael has called and wants Hannah to know that she has left a note on mychart  that she can see before hand so she will know what is going on with him.

## 2025-04-09 NOTE — TELEPHONE ENCOUNTER
Samir,  I spoke with Kavya, patient's daughter and she says they brought the FIT test back Monday.   I don't see results anywhere. Do you know about it?   Thanks!  Katina

## 2025-04-10 ENCOUNTER — RESULTS FOLLOW-UP (OUTPATIENT)
Dept: INTERNAL MEDICINE CLINIC | Facility: CLINIC | Age: 85
End: 2025-04-10

## 2025-04-10 DIAGNOSIS — K21.01 GASTROESOPHAGEAL REFLUX DISEASE WITH ESOPHAGITIS AND HEMORRHAGE: Primary | ICD-10-CM

## 2025-04-10 RX ORDER — PANTOPRAZOLE SODIUM 20 MG/1
20 TABLET, DELAYED RELEASE ORAL
Qty: 30 TABLET | Refills: 5 | Status: SHIPPED | OUTPATIENT
Start: 2025-04-10

## 2025-04-13 ENCOUNTER — TELEPHONE (OUTPATIENT)
Dept: INTERNAL MEDICINE CLINIC | Facility: CLINIC | Age: 85
End: 2025-04-13

## 2025-04-28 DIAGNOSIS — N39.0 RECURRENT UTI: ICD-10-CM

## 2025-04-28 DIAGNOSIS — N39.0 RECURRENT UTI: Primary | ICD-10-CM

## 2025-04-28 DIAGNOSIS — K21.01 GASTROESOPHAGEAL REFLUX DISEASE WITH ESOPHAGITIS AND HEMORRHAGE: ICD-10-CM

## 2025-04-28 LAB
APPEARANCE UR: CLEAR
BACTERIA URNS QL MICRO: NEGATIVE /HPF
BASOPHILS # BLD: 0.08 K/UL (ref 0–0.2)
BASOPHILS NFR BLD: 1.1 % (ref 0–2)
BILIRUB UR QL: NEGATIVE
COLOR UR: ABNORMAL
DIFFERENTIAL METHOD BLD: ABNORMAL
EOSINOPHIL # BLD: 0.12 K/UL (ref 0–0.8)
EOSINOPHIL NFR BLD: 1.6 % (ref 0.5–7.8)
EPI CELLS #/AREA URNS HPF: ABNORMAL /HPF (ref 0–5)
ERYTHROCYTE [DISTWIDTH] IN BLOOD BY AUTOMATED COUNT: 15.5 % (ref 11.9–14.6)
GLUCOSE UR STRIP.AUTO-MCNC: NEGATIVE MG/DL
HCT VFR BLD AUTO: 36.9 % (ref 41.1–50.3)
HGB BLD-MCNC: 11.6 G/DL (ref 13.6–17.2)
HGB UR QL STRIP: NEGATIVE
HYALINE CASTS URNS QL MICRO: ABNORMAL /LPF
IMM GRANULOCYTES # BLD AUTO: 0.02 K/UL (ref 0–0.5)
IMM GRANULOCYTES NFR BLD AUTO: 0.3 % (ref 0–5)
KETONES UR QL STRIP.AUTO: ABNORMAL MG/DL
LEUKOCYTE ESTERASE UR QL STRIP.AUTO: ABNORMAL
LYMPHOCYTES # BLD: 0.96 K/UL (ref 0.5–4.6)
LYMPHOCYTES NFR BLD: 13.1 % (ref 13–44)
MCH RBC QN AUTO: 29.2 PG (ref 26.1–32.9)
MCHC RBC AUTO-ENTMCNC: 31.4 G/DL (ref 31.4–35)
MCV RBC AUTO: 92.9 FL (ref 82–102)
MONOCYTES # BLD: 0.6 K/UL (ref 0.1–1.3)
MONOCYTES NFR BLD: 8.2 % (ref 4–12)
NEUTS SEG # BLD: 5.56 K/UL (ref 1.7–8.2)
NEUTS SEG NFR BLD: 75.7 % (ref 43–78)
NITRITE UR QL STRIP.AUTO: NEGATIVE
NRBC # BLD: 0 K/UL (ref 0–0.2)
PH UR STRIP: 5.5 (ref 5–9)
PLATELET # BLD AUTO: 181 K/UL (ref 150–450)
PMV BLD AUTO: 12.2 FL (ref 9.4–12.3)
PROT UR STRIP-MCNC: NEGATIVE MG/DL
RBC # BLD AUTO: 3.97 M/UL (ref 4.23–5.6)
RBC #/AREA URNS HPF: ABNORMAL /HPF (ref 0–5)
SP GR UR REFRACTOMETRY: 1.02 (ref 1–1.02)
UROBILINOGEN UR QL STRIP.AUTO: 1 EU/DL (ref 0.2–1)
WBC # BLD AUTO: 7.3 K/UL (ref 4.3–11.1)
WBC URNS QL MICRO: ABNORMAL /HPF (ref 0–4)

## 2025-04-29 ENCOUNTER — RESULTS FOLLOW-UP (OUTPATIENT)
Dept: INTERNAL MEDICINE CLINIC | Facility: CLINIC | Age: 85
End: 2025-04-29

## 2025-04-30 RX ORDER — CEPHALEXIN 500 MG/1
500 CAPSULE ORAL 3 TIMES DAILY
Qty: 21 CAPSULE | Refills: 0 | Status: SHIPPED | OUTPATIENT
Start: 2025-04-30 | End: 2025-05-07

## 2025-05-02 NOTE — TELEPHONE ENCOUNTER
Patient informed and verbalized understanding.     Patient states he is no longer having black stools.     1 month follow up scheduled.

## 2025-05-12 NOTE — PROGRESS NOTES
Zacarias Olsen is 84 y.o. y/o male here for initial evaluation.     History of Present Illness  The patient presents for evaluation of black stools.    He reports experiencing black stools, which he attributes to the administration of Pepto-Bismol. It is noteworthy that he had observed black stools for a duration of 2 days prior to the initiation of Pepto-Bismol treatment. He mentions that the black stools resolve when he sits or lies down. Additionally, he reports feeling nauseous and lacking energy when standing or moving around.     A history of bypass surgery approximately 4 years ago is noted, following which he experienced a loss of taste and a subsequent weight loss of 80 pounds. Recently, he has noticed a slight weight gain. He does not report any difficulty in swallowing but notes that it induces coughing or sneezing.     PAST SURGICAL HISTORY: Bypass surgery approximately 4 years ago.       No procedures noted in the chart review.       CT ABDOMEN W WO CONTRAST 4/16/2018  IMPRESSION: Stable simple appearing cysts of the kidneys. Aortic  atherosclerosis.     FIT neg 4/2025.     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

## 2025-05-15 ENCOUNTER — OFFICE VISIT (OUTPATIENT)
Dept: GASTROENTEROLOGY | Age: 85
End: 2025-05-15
Payer: MEDICARE

## 2025-05-15 VITALS
DIASTOLIC BLOOD PRESSURE: 62 MMHG | WEIGHT: 200 LBS | OXYGEN SATURATION: 94 % | HEART RATE: 76 BPM | SYSTOLIC BLOOD PRESSURE: 120 MMHG | HEIGHT: 75 IN | BODY MASS INDEX: 24.87 KG/M2 | RESPIRATION RATE: 15 BRPM | TEMPERATURE: 98 F

## 2025-05-15 DIAGNOSIS — K59.01 SLOW TRANSIT CONSTIPATION: ICD-10-CM

## 2025-05-15 DIAGNOSIS — K92.1 BLACK STOOLS: Primary | ICD-10-CM

## 2025-05-15 PROCEDURE — 1159F MED LIST DOCD IN RCRD: CPT | Performed by: STUDENT IN AN ORGANIZED HEALTH CARE EDUCATION/TRAINING PROGRAM

## 2025-05-15 PROCEDURE — 1160F RVW MEDS BY RX/DR IN RCRD: CPT | Performed by: STUDENT IN AN ORGANIZED HEALTH CARE EDUCATION/TRAINING PROGRAM

## 2025-05-15 PROCEDURE — G8419 CALC BMI OUT NRM PARAM NOF/U: HCPCS | Performed by: STUDENT IN AN ORGANIZED HEALTH CARE EDUCATION/TRAINING PROGRAM

## 2025-05-15 PROCEDURE — G8427 DOCREV CUR MEDS BY ELIG CLIN: HCPCS | Performed by: STUDENT IN AN ORGANIZED HEALTH CARE EDUCATION/TRAINING PROGRAM

## 2025-05-15 PROCEDURE — 1123F ACP DISCUSS/DSCN MKR DOCD: CPT | Performed by: STUDENT IN AN ORGANIZED HEALTH CARE EDUCATION/TRAINING PROGRAM

## 2025-05-15 PROCEDURE — 1036F TOBACCO NON-USER: CPT | Performed by: STUDENT IN AN ORGANIZED HEALTH CARE EDUCATION/TRAINING PROGRAM

## 2025-05-15 PROCEDURE — 99204 OFFICE O/P NEW MOD 45 MIN: CPT | Performed by: STUDENT IN AN ORGANIZED HEALTH CARE EDUCATION/TRAINING PROGRAM

## 2025-05-15 RX ORDER — PANTOPRAZOLE SODIUM 40 MG/1
40 TABLET, DELAYED RELEASE ORAL
Qty: 60 TABLET | Refills: 0 | Status: SHIPPED | OUTPATIENT
Start: 2025-05-15 | End: 2025-06-14

## 2025-05-15 RX ORDER — PANTOPRAZOLE SODIUM 40 MG/1
40 TABLET, DELAYED RELEASE ORAL
Qty: 90 TABLET | Refills: 0 | Status: SHIPPED | OUTPATIENT
Start: 2025-06-16

## 2025-05-19 RX ORDER — DILTIAZEM HYDROCHLORIDE 120 MG/1
CAPSULE, COATED, EXTENDED RELEASE ORAL DAILY
Qty: 30 CAPSULE | Refills: 11 | OUTPATIENT
Start: 2025-05-19

## 2025-05-29 ENCOUNTER — OFFICE VISIT (OUTPATIENT)
Dept: INTERNAL MEDICINE CLINIC | Facility: CLINIC | Age: 85
End: 2025-05-29
Payer: MEDICARE

## 2025-05-29 VITALS
TEMPERATURE: 97.7 F | DIASTOLIC BLOOD PRESSURE: 59 MMHG | HEART RATE: 78 BPM | RESPIRATION RATE: 16 BRPM | OXYGEN SATURATION: 98 % | SYSTOLIC BLOOD PRESSURE: 112 MMHG

## 2025-05-29 DIAGNOSIS — G30.9 ALZHEIMER'S DISEASE, UNSPECIFIED (CODE) (HCC): ICD-10-CM

## 2025-05-29 DIAGNOSIS — I95.1 ORTHOSTATIC HYPOTENSION: ICD-10-CM

## 2025-05-29 DIAGNOSIS — G20.A1 PARKINSON'S DISEASE WITHOUT DYSKINESIA OR FLUCTUATING MANIFESTATIONS (HCC): ICD-10-CM

## 2025-05-29 DIAGNOSIS — I95.1 ORTHOSTATIC HYPOTENSION: Primary | ICD-10-CM

## 2025-05-29 DIAGNOSIS — R11.0 NAUSEA: ICD-10-CM

## 2025-05-29 LAB
ALBUMIN SERPL-MCNC: 3.4 G/DL (ref 3.2–4.6)
ALBUMIN/GLOB SERPL: 0.9 (ref 1–1.9)
ALP SERPL-CCNC: 83 U/L (ref 40–129)
ALT SERPL-CCNC: <5 U/L (ref 8–55)
ANION GAP SERPL CALC-SCNC: 12 MMOL/L (ref 7–16)
AST SERPL-CCNC: 15 U/L (ref 15–37)
BASOPHILS # BLD: 0.07 K/UL (ref 0–0.2)
BASOPHILS NFR BLD: 1 % (ref 0–2)
BILIRUB SERPL-MCNC: 0.4 MG/DL (ref 0–1.2)
BUN SERPL-MCNC: 41 MG/DL (ref 8–23)
CALCIUM SERPL-MCNC: 9.5 MG/DL (ref 8.8–10.2)
CHLORIDE SERPL-SCNC: 104 MMOL/L (ref 98–107)
CO2 SERPL-SCNC: 23 MMOL/L (ref 20–29)
CREAT SERPL-MCNC: 1.45 MG/DL (ref 0.8–1.3)
DIFFERENTIAL METHOD BLD: ABNORMAL
EOSINOPHIL # BLD: 0.13 K/UL (ref 0–0.8)
EOSINOPHIL NFR BLD: 1.9 % (ref 0.5–7.8)
ERYTHROCYTE [DISTWIDTH] IN BLOOD BY AUTOMATED COUNT: 15.7 % (ref 11.9–14.6)
GLOBULIN SER CALC-MCNC: 4 G/DL (ref 2.3–3.5)
GLUCOSE SERPL-MCNC: 116 MG/DL (ref 70–99)
HCT VFR BLD AUTO: 37.3 % (ref 41.1–50.3)
HGB BLD-MCNC: 11.9 G/DL (ref 13.6–17.2)
IMM GRANULOCYTES # BLD AUTO: 0.02 K/UL (ref 0–0.5)
IMM GRANULOCYTES NFR BLD AUTO: 0.3 % (ref 0–5)
LYMPHOCYTES # BLD: 0.89 K/UL (ref 0.5–4.6)
LYMPHOCYTES NFR BLD: 13.3 % (ref 13–44)
MCH RBC QN AUTO: 29.8 PG (ref 26.1–32.9)
MCHC RBC AUTO-ENTMCNC: 31.9 G/DL (ref 31.4–35)
MCV RBC AUTO: 93.5 FL (ref 82–102)
MONOCYTES # BLD: 0.54 K/UL (ref 0.1–1.3)
MONOCYTES NFR BLD: 8.1 % (ref 4–12)
NEUTS SEG # BLD: 5.05 K/UL (ref 1.7–8.2)
NEUTS SEG NFR BLD: 75.4 % (ref 43–78)
NRBC # BLD: 0 K/UL (ref 0–0.2)
PLATELET # BLD AUTO: 167 K/UL (ref 150–450)
PMV BLD AUTO: 12.5 FL (ref 9.4–12.3)
POTASSIUM SERPL-SCNC: 4.6 MMOL/L (ref 3.5–5.1)
PROT SERPL-MCNC: 7.4 G/DL (ref 6.3–8.2)
RBC # BLD AUTO: 3.99 M/UL (ref 4.23–5.6)
SODIUM SERPL-SCNC: 140 MMOL/L (ref 136–145)
WBC # BLD AUTO: 6.7 K/UL (ref 4.3–11.1)

## 2025-05-29 PROCEDURE — G8419 CALC BMI OUT NRM PARAM NOF/U: HCPCS | Performed by: NURSE PRACTITIONER

## 2025-05-29 PROCEDURE — G8427 DOCREV CUR MEDS BY ELIG CLIN: HCPCS | Performed by: NURSE PRACTITIONER

## 2025-05-29 PROCEDURE — 1160F RVW MEDS BY RX/DR IN RCRD: CPT | Performed by: NURSE PRACTITIONER

## 2025-05-29 PROCEDURE — 1123F ACP DISCUSS/DSCN MKR DOCD: CPT | Performed by: NURSE PRACTITIONER

## 2025-05-29 PROCEDURE — 1036F TOBACCO NON-USER: CPT | Performed by: NURSE PRACTITIONER

## 2025-05-29 PROCEDURE — 1159F MED LIST DOCD IN RCRD: CPT | Performed by: NURSE PRACTITIONER

## 2025-05-29 PROCEDURE — G2211 COMPLEX E/M VISIT ADD ON: HCPCS | Performed by: NURSE PRACTITIONER

## 2025-05-29 PROCEDURE — 99214 OFFICE O/P EST MOD 30 MIN: CPT | Performed by: NURSE PRACTITIONER

## 2025-05-29 RX ORDER — MIDODRINE HYDROCHLORIDE 2.5 MG/1
2.5 TABLET ORAL 3 TIMES DAILY
Qty: 90 TABLET | Refills: 3 | Status: SHIPPED | OUTPATIENT
Start: 2025-05-29

## 2025-05-29 ASSESSMENT — ENCOUNTER SYMPTOMS: NAUSEA: 1

## 2025-05-29 NOTE — PROGRESS NOTES
5/29/2025 2:42 PM  Location:[facility] PHYSICIAN SERVICES  [facility] INTERNAL MEDICINE  [place]  Patient #:  [id number]  YOB: 1940    Reason for Visit  1 Month Follow-Up Patient presents in the office today for a 1 month follow up on GI issues.  Patient states he feels like he is going to vomit whenever he stands up.           History of Present Illness  The patient is an 84-year-old male who presents for a 1-month follow-up. He presented on 04/09/2025 with black stools and was started on Protonix. He recently saw GI, and that note was reviewed. His Protonix was increased to 40 mg twice daily, and he is being tested for H. pylori. He is also being considered for EGD if black stools do not get resolved and has follow-up via telephone with GI. His history is significant for CAD, atrial fibrillation, on aspirin 81 mg, AILEEN, constipation, Alzheimer's and Parkinson's, CKD stage 3, and prostate cancer. He is accompanied by his son.    The patient reports persistent nausea upon standing, which resolves upon sitting. He has not experienced any episodes of vomiting or lightheadedness upon standing. His blood pressure has been monitored at home during these episodes. He reports no current presence of black stools. Despite an increase in his Protonix dosage, he reports no significant changes in his condition. His appetite remains poor, but he has not experienced any recent weight loss. He recalls a significant weight loss of approximately 100 pounds following his heart bypass surgery, which was accompanied by a loss of appetite. He reports no changes in his medication regimen, except for the increased dose of Protonix prescribed by his gastroenterologist. He reports difficulty swallowing and was advised to contact his gastroenterologist after 28 days of pantoprazole treatment. He reports no new symptoms. He resides with his wife and does not receive home health care. He manages his household tasks

## 2025-05-30 ENCOUNTER — RESULTS FOLLOW-UP (OUTPATIENT)
Dept: INTERNAL MEDICINE CLINIC | Facility: CLINIC | Age: 85
End: 2025-05-30

## 2025-06-06 DIAGNOSIS — K92.1 BLACK STOOLS: ICD-10-CM

## 2025-06-06 DIAGNOSIS — K59.01 SLOW TRANSIT CONSTIPATION: ICD-10-CM

## 2025-06-10 ENCOUNTER — TELEPHONE (OUTPATIENT)
Dept: GASTROENTEROLOGY | Age: 85
End: 2025-06-10

## 2025-06-10 RX ORDER — PANTOPRAZOLE SODIUM 40 MG/1
80 TABLET, DELAYED RELEASE ORAL
Qty: 180 TABLET | Refills: 1 | OUTPATIENT
Start: 2025-06-10

## 2025-06-10 NOTE — TELEPHONE ENCOUNTER
Called patient in response to rina surescripts refill request for pantoprazole. Advised patient that he will need to finish his 30 day supply of this medication, wait 2 weeks then go by lab for H. Pylori breath testing. Once this has resulted we will schedule follow up to discuss ongoing nausea/ possibly schedule EGD per Dr Louise's last office note. Patient verbalized agreement/ understanding.

## 2025-06-12 ENCOUNTER — TELEPHONE (OUTPATIENT)
Dept: GASTROENTEROLOGY | Age: 85
End: 2025-06-12

## 2025-06-12 NOTE — TELEPHONE ENCOUNTER
Pts caretaker called to confirm instructions Jacinta had provided to the patient on phone call 6/10.  (Pt has some dementia).  She was explained those instructions again, confirmed that, yes, he needs to go 2 weeks after finishing Rx to have breath test again.  They were given prep instructions for breath test (NPO 1 hr prior) and confirmed lab locations.    Caretake did state that the medications were not working and wondered if there were anything he could do in those two weeks to help.

## 2025-06-12 NOTE — TELEPHONE ENCOUNTER
Returned call to patient's daughter (Ruth,on RANDY). She asked what else could be done for patient's nausea. The pantoprazole was not helpful. Patient describes nausea when he stands up and when he is walking. His nausea seems to subside somewhat when he is sitting/ laying. He had had this nausea \"for quite some time\" even before starting Parkinson's/ memory medications per patient's daughter. Patient is no longer taking Pepto and stools are no longer black. Patient does not recall having EGD. Will send message to Dr Louise for further recommendations.    1330  Returned call to patient/ patient's daughter Roseline. Advised that Dr Louise recommends scheduling  EGD for further investigation into patient's ongoing nausea. Patient/ patient's daughter in agreement. Will send message to Dr Louise's surgery scheduler for next available time.

## 2025-06-13 ENCOUNTER — PREP FOR PROCEDURE (OUTPATIENT)
Age: 85
End: 2025-06-13

## 2025-06-13 DIAGNOSIS — R11.0 NAUSEA: ICD-10-CM

## 2025-06-13 RX ORDER — SODIUM CHLORIDE 0.9 % (FLUSH) 0.9 %
5-40 SYRINGE (ML) INJECTION PRN
Status: CANCELLED | OUTPATIENT
Start: 2025-06-13

## 2025-06-13 RX ORDER — SODIUM CHLORIDE 9 MG/ML
25 INJECTION, SOLUTION INTRAVENOUS PRN
Status: CANCELLED | OUTPATIENT
Start: 2025-06-13

## 2025-06-13 RX ORDER — SODIUM CHLORIDE 0.9 % (FLUSH) 0.9 %
5-40 SYRINGE (ML) INJECTION EVERY 12 HOURS SCHEDULED
Status: CANCELLED | OUTPATIENT
Start: 2025-06-13

## 2025-06-14 NOTE — PROGRESS NOTES
R band removed. Dressing placed in sterile fashion. No bleeding or hematoma noted. Alert-The patient is alert, awake and responds to voice. The patient is oriented to time, place, and person. The triage nurse is able to obtain subjective information.

## 2025-06-16 RX ORDER — ONDANSETRON 4 MG/1
4 TABLET, FILM COATED ORAL EVERY 12 HOURS PRN
COMMUNITY
End: 2025-06-23

## 2025-06-16 NOTE — PERIOP NOTE
Patient verified name, , and procedure.    Type: 1a; abbreviated assessment per anesthesia guidelines  Labs per surgeon: None  Labs per anesthesia: None      Instructed pt that they will be notified by the Gi Lab for time of arrival. If any questions please call the GI lab at [phone].    Follow diet and prep instructions per office. Nothing to eat or drink after midnight.     Bath or shower the night before and the am of surgery with antibacterial soap. No lotions, oils, powders, cologne on skin. No make up, eye make up or jewelry. Wear loose fitting comfortable, clean clothing.     Must have adult present in building the entire time .     Medications for the day of procedure Aspirin, Sinemet, Diltiazem, Midodrine, Zofran (if needed)    Please hold all vitamins x 7 days prior to procedure and NSAIDS (Aspirin, Excedrin, Goody powders, Motrin, Ibuprofen, Advil, Aleve and Naproxen) x 5 days prior to procedure. Should you have a procedure date that does not allow for the amount of time instructed above, please stop taking vitamins, supplements, and NSAIDS IMMEDIATELY.     The following discharge instructions reviewed with patient: medication given during procedure may cause drowsiness for several hours, therefore, do not drive or operate machinery for remainder of the day, no alcohol on the day of your procedure, resume regular diet and activity unless otherwise directed, for mild sore throat you may use Cepacol throat lozenges or warm salt water gargles as needed, call your physician for any problems or questions. Patient verbalizes understanding.    [facility]

## 2025-06-18 ENCOUNTER — ANESTHESIA (OUTPATIENT)
Dept: ENDOSCOPY | Age: 85
End: 2025-06-18
Payer: MEDICARE

## 2025-06-18 ENCOUNTER — HOSPITAL ENCOUNTER (OUTPATIENT)
Age: 85
Discharge: HOME | End: 2025-06-18
Attending: STUDENT IN AN ORGANIZED HEALTH CARE EDUCATION/TRAINING PROGRAM | Admitting: STUDENT IN AN ORGANIZED HEALTH CARE EDUCATION/TRAINING PROGRAM
Payer: MEDICARE

## 2025-06-18 ENCOUNTER — ANESTHESIA EVENT (OUTPATIENT)
Dept: ENDOSCOPY | Age: 85
End: 2025-06-18
Payer: MEDICARE

## 2025-06-18 VITALS
DIASTOLIC BLOOD PRESSURE: 69 MMHG | SYSTOLIC BLOOD PRESSURE: 132 MMHG | RESPIRATION RATE: 13 BRPM | HEART RATE: 85 BPM | HEIGHT: 75 IN | BODY MASS INDEX: 22.38 KG/M2 | TEMPERATURE: 98 F | OXYGEN SATURATION: 97 % | WEIGHT: 180 LBS

## 2025-06-18 DIAGNOSIS — K29.00 ACUTE SUPERFICIAL GASTRITIS WITHOUT HEMORRHAGE: ICD-10-CM

## 2025-06-18 DIAGNOSIS — R11.0 NAUSEA: ICD-10-CM

## 2025-06-18 DIAGNOSIS — R11.2 NAUSEA AND VOMITING, UNSPECIFIED VOMITING TYPE: Primary | ICD-10-CM

## 2025-06-18 DIAGNOSIS — K44.9 DIAPHRAGMATIC HERNIA WITHOUT OBSTRUCTION AND WITHOUT GANGRENE: ICD-10-CM

## 2025-06-18 PROCEDURE — 88305 TISSUE EXAM BY PATHOLOGIST: CPT

## 2025-06-18 PROCEDURE — 6360000002 HC RX W HCPCS: Performed by: NURSE ANESTHETIST, CERTIFIED REGISTERED

## 2025-06-18 PROCEDURE — 2580000003 HC RX 258: Performed by: NURSE ANESTHETIST, CERTIFIED REGISTERED

## 2025-06-18 PROCEDURE — 2709999900 HC NON-CHARGEABLE SUPPLY: Performed by: STUDENT IN AN ORGANIZED HEALTH CARE EDUCATION/TRAINING PROGRAM

## 2025-06-18 PROCEDURE — 7100000010 HC PHASE II RECOVERY - FIRST 15 MIN: Performed by: STUDENT IN AN ORGANIZED HEALTH CARE EDUCATION/TRAINING PROGRAM

## 2025-06-18 PROCEDURE — 43239 EGD BIOPSY SINGLE/MULTIPLE: CPT | Performed by: STUDENT IN AN ORGANIZED HEALTH CARE EDUCATION/TRAINING PROGRAM

## 2025-06-18 PROCEDURE — 3609012400 HC EGD TRANSORAL BIOPSY SINGLE/MULTIPLE: Performed by: STUDENT IN AN ORGANIZED HEALTH CARE EDUCATION/TRAINING PROGRAM

## 2025-06-18 PROCEDURE — 3700000000 HC ANESTHESIA ATTENDED CARE: Performed by: STUDENT IN AN ORGANIZED HEALTH CARE EDUCATION/TRAINING PROGRAM

## 2025-06-18 PROCEDURE — 7100000011 HC PHASE II RECOVERY - ADDTL 15 MIN: Performed by: STUDENT IN AN ORGANIZED HEALTH CARE EDUCATION/TRAINING PROGRAM

## 2025-06-18 PROCEDURE — 2580000003 HC RX 258: Performed by: ANESTHESIOLOGY

## 2025-06-18 RX ORDER — PROPOFOL 10 MG/ML
INJECTION, EMULSION INTRAVENOUS
Status: DISCONTINUED | OUTPATIENT
Start: 2025-06-18 | End: 2025-06-18 | Stop reason: SDUPTHER

## 2025-06-18 RX ORDER — FAMOTIDINE 40 MG/1
40 TABLET, FILM COATED ORAL
Qty: 30 TABLET | Refills: 3 | Status: SHIPPED | OUTPATIENT
Start: 2025-06-18

## 2025-06-18 RX ORDER — SODIUM CHLORIDE 0.9 % (FLUSH) 0.9 %
5-40 SYRINGE (ML) INJECTION PRN
Status: DISCONTINUED | OUTPATIENT
Start: 2025-06-18 | End: 2025-06-18 | Stop reason: HOSPADM

## 2025-06-18 RX ORDER — SODIUM CHLORIDE 0.9 % (FLUSH) 0.9 %
5-40 SYRINGE (ML) INJECTION EVERY 12 HOURS SCHEDULED
Status: DISCONTINUED | OUTPATIENT
Start: 2025-06-18 | End: 2025-06-18 | Stop reason: HOSPADM

## 2025-06-18 RX ORDER — ONDANSETRON 8 MG/1
8 TABLET, ORALLY DISINTEGRATING ORAL EVERY 8 HOURS PRN
Qty: 90 TABLET | Refills: 1 | Status: SHIPPED | OUTPATIENT
Start: 2025-06-18 | End: 2025-10-16

## 2025-06-18 RX ORDER — LIDOCAINE HYDROCHLORIDE 20 MG/ML
INJECTION, SOLUTION EPIDURAL; INFILTRATION; INTRACAUDAL; PERINEURAL
Status: DISCONTINUED | OUTPATIENT
Start: 2025-06-18 | End: 2025-06-18 | Stop reason: SDUPTHER

## 2025-06-18 RX ORDER — SODIUM CHLORIDE, SODIUM LACTATE, POTASSIUM CHLORIDE, CALCIUM CHLORIDE 600; 310; 30; 20 MG/100ML; MG/100ML; MG/100ML; MG/100ML
INJECTION, SOLUTION INTRAVENOUS
Status: DISCONTINUED | OUTPATIENT
Start: 2025-06-18 | End: 2025-06-18 | Stop reason: SDUPTHER

## 2025-06-18 RX ORDER — SODIUM CHLORIDE, SODIUM LACTATE, POTASSIUM CHLORIDE, CALCIUM CHLORIDE 600; 310; 30; 20 MG/100ML; MG/100ML; MG/100ML; MG/100ML
INJECTION, SOLUTION INTRAVENOUS CONTINUOUS
Status: DISCONTINUED | OUTPATIENT
Start: 2025-06-18 | End: 2025-06-18 | Stop reason: HOSPADM

## 2025-06-18 RX ORDER — OMEPRAZOLE 40 MG/1
CAPSULE, DELAYED RELEASE ORAL
Qty: 180 CAPSULE | Refills: 0 | Status: SHIPPED | OUTPATIENT
Start: 2025-06-18 | End: 2025-10-16

## 2025-06-18 RX ORDER — SODIUM CHLORIDE 9 MG/ML
25 INJECTION, SOLUTION INTRAVENOUS PRN
Status: DISCONTINUED | OUTPATIENT
Start: 2025-06-18 | End: 2025-06-18 | Stop reason: HOSPADM

## 2025-06-18 RX ADMIN — PROPOFOL 100 MCG/KG/MIN: 10 INJECTION, EMULSION INTRAVENOUS at 12:35

## 2025-06-18 RX ADMIN — LIDOCAINE HYDROCHLORIDE 40 MG: 20 INJECTION, SOLUTION EPIDURAL; INFILTRATION; INTRACAUDAL; PERINEURAL at 12:34

## 2025-06-18 RX ADMIN — PROPOFOL 10 MG: 10 INJECTION, EMULSION INTRAVENOUS at 12:39

## 2025-06-18 RX ADMIN — SODIUM CHLORIDE, SODIUM LACTATE, POTASSIUM CHLORIDE, AND CALCIUM CHLORIDE: 600; 310; 30; 20 INJECTION, SOLUTION INTRAVENOUS at 12:19

## 2025-06-18 RX ADMIN — PROPOFOL 20 MG: 10 INJECTION, EMULSION INTRAVENOUS at 12:36

## 2025-06-18 RX ADMIN — SODIUM CHLORIDE, POTASSIUM CHLORIDE, SODIUM LACTATE AND CALCIUM CHLORIDE: 600; 310; 30; 20 INJECTION, SOLUTION INTRAVENOUS at 12:23

## 2025-06-18 RX ADMIN — PROPOFOL 20 MG: 10 INJECTION, EMULSION INTRAVENOUS at 12:34

## 2025-06-18 ASSESSMENT — PAIN - FUNCTIONAL ASSESSMENT: PAIN_FUNCTIONAL_ASSESSMENT: 0-10

## 2025-06-18 NOTE — ANESTHESIA POSTPROCEDURE EVALUATION
Department of Anesthesiology  Postprocedure Note    Patient: Zacarias Olsen  MRN: [id number]  YOB: 1940  Date of evaluation: 6/18/2025    Procedure Summary       Date: 06/18/25 Room / Location: [facility] ENDO 04 / [facility] ENDOSCOPY    Anesthesia Start: 1229 Anesthesia Stop: 1247    Procedure: ESOPHAGOGASTRODUODENOSCOPY BIOPSY (Upper GI Region) Diagnosis:       Nausea      (Nausea [R11.0])    Surgeons: Lorraine Louise MD Responsible Provider: Pedro Jean MD    Anesthesia Type: TIVA ASA Status: 3            Anesthesia Type: No value filed.    Miguel Phase I: Miguel Score: 10    Miguel Phase II: Miguel Score: 7    Anesthesia Post Evaluation    Patient location during evaluation: PACU  Patient participation: complete - patient participated  Level of consciousness: awake and alert  Airway patency: patent  Nausea & Vomiting: no nausea and no vomiting  Cardiovascular status: hemodynamically stable  Respiratory status: acceptable, nonlabored ventilation and spontaneous ventilation  Hydration status: euvolemic  Comments: /60   Pulse (!) 110   Temp 98 °F (36.7 °C)   Resp 16   Ht 1.905 m (6' 3\")   Wt 81.6 kg (180 lb)   SpO2 100%   BMI 22.50 kg/m²     Multimodal analgesia pain management approach  Pain management: adequate and satisfactory to patient    No notable events documented.

## 2025-06-18 NOTE — ANESTHESIA PRE PROCEDURE
Department of Anesthesiology  Preprocedure Note       Name:  Zacarias Olsen   Age:  84 y.o.  :  1940                                          MRN:  [id number]         Date:  2025      Surgeon: Surgeon(s):  Lorraine Louise MD    Procedure: Procedure(s):  ESOPHAGOGASTRODUODENOSCOPY    Medications prior to admission:   Prior to Admission medications    Medication Sig Start Date End Date Taking? Authorizing Provider   ondansetron (ZOFRAN) 4 MG tablet Take 1 tablet by mouth every 12 hours as needed for Nausea or Vomiting   Yes Susan Carranza MD   midodrine (PROAMATINE) 2.5 MG tablet Take 1 tablet by mouth 3 times daily 25  Yes Sade Hale APRN - CNP   pantoprazole (PROTONIX) 40 MG tablet Take 1 tablet by mouth 2 times daily (before meals) 5/15/25 6/16/25 Yes Lorraine Louise MD   carbidopa-levodopa (SINEMET CR)  MG per extended release tablet Take 2 tablets by mouth nightly 25  Yes Samuel Khan MD   cyanocobalamin 1000 MCG/ML injection INJECT 1 ML INTO THE MUSCLE EVERY 30 DAYS 25  Yes Samuel Khan MD   donepezil (ARICEPT) 10 MG tablet TAKE 1 TABLET BY MOUTH EVERY DAY AT NIGHT 24  Yes Vishal Valera MD   dilTIAZem (CARDIZEM CD) 120 MG extended release capsule Take 1 capsule by mouth daily 24  Yes Dane Perry DO   carbidopa-levodopa (SINEMET)  MG per tablet Take 3 tablets by mouth 3 times daily  Patient taking differently: Take 2 tablets by mouth 3 times daily 24  Yes Vishal Valera MD   Multiple Vitamin (MULTIVITAMIN ADULT PO) Take 1 tablet by mouth daily   Yes Susan Carranza MD   famotidine (PEPCID) 20 MG tablet Take 1 tablet by mouth 2 times daily  Patient not taking: Reported on 2025 4/3/25   Samuel Khan MD   memantine (NAMENDA) 10 MG tablet Take 1 tablet by mouth 2 times daily  Patient not taking: Reported on 2025 7/3/24   Vishal Valera MD   nystatin (MYCOSTATIN) 931985 UNIT/GM

## 2025-06-18 NOTE — DISCHARGE INSTRUCTIONS
Gastrointestinal Esophagogastroduodenoscopy (EGD) - Upper Exam Discharge Instructions    1. Call Dr. Louise at [phone] for any problems or questions.    2. Contact the doctor's office for follow up appointment as directed.    3. Medication may cause drowsiness for several hours, therefore:  Do not drive or operate machinery for remainder of the day.    No alcohol today.  Do not make any important or legal decisions for 24 hours.  Do not sign any legal documents for 24 hours.    5. Ordinarily, you may resume regular diet and activity after exam unless otherwise specified by your physician.    6. For mild soreness in your throat you may use Cepacol throat lozenges or warm salt-water gargles as needed.

## 2025-06-18 NOTE — H&P
Zacarias Olsen is 84 y.o. y/o male here for initial evaluation.      History of Present Illness  The patient presents for evaluation of black stools.     He reports experiencing black stools, which he attributes to the administration of Pepto-Bismol. It is noteworthy that he had observed black stools for a duration of 2 days prior to the initiation of Pepto-Bismol treatment. He mentions that the black stools resolve when he sits or lies down. Additionally, he reports feeling nauseous and lacking energy when standing or moving around.      A history of bypass surgery approximately 4 years ago is noted, following which he experienced a loss of taste and a subsequent weight loss of 80 pounds. Recently, he has noticed a slight weight gain. He does not report any difficulty in swallowing but notes that it induces coughing or sneezing.      PAST SURGICAL HISTORY: Bypass surgery approximately 4 years ago.        No procedures noted in the chart review.         CT ABDOMEN W WO CONTRAST 4/16/2018  IMPRESSION: Stable simple appearing cysts of the kidneys. Aortic  atherosclerosis.      FIT neg 4/2025.            Lab Results   Component Value Date     HGB 11.6 (L) 04/28/2025     WBC 7.3 04/28/2025      04/28/2025     MCV 92.9 04/28/2025     FERRITIN 318 08/13/2024     CREATININE 1.38 (H) 04/09/2025     ALT <5 (L) 04/09/2025     AST 19 04/09/2025         Past Medical History        Past Medical History:   Diagnosis Date    Arthritis       OA generalized    Buerger's disease       left leg blood clot     CAD (coronary artery disease)      Decreased mobility      Gout      Hematuria, microscopic      Hiatal hernia      Personal history of prostate cancer      Prostate cancer (HCC)       2000    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

## 2025-06-19 DIAGNOSIS — F02.80 DEMENTIA ASSOCIATED WITH PARKINSON'S DISEASE (HCC): ICD-10-CM

## 2025-06-19 DIAGNOSIS — G20.A1 DEMENTIA ASSOCIATED WITH PARKINSON'S DISEASE (HCC): ICD-10-CM

## 2025-06-20 RX ORDER — DILTIAZEM HYDROCHLORIDE 120 MG/1
CAPSULE, COATED, EXTENDED RELEASE ORAL DAILY
Qty: 90 CAPSULE | Refills: 0 | Status: SHIPPED | OUTPATIENT
Start: 2025-06-20

## 2025-06-20 RX ORDER — MEMANTINE HYDROCHLORIDE 10 MG/1
10 TABLET ORAL 2 TIMES DAILY
Qty: 60 TABLET | Refills: 5 | Status: SHIPPED | OUTPATIENT
Start: 2025-06-20

## 2025-06-20 NOTE — TELEPHONE ENCOUNTER
Requested Prescriptions     Pending Prescriptions Disp Refills    dilTIAZem (CARDIZEM CD) 120 MG extended release capsule [Pharmacy Med Name: DILTIAZEM 24H ER(CD) 120 MG CP] 90 capsule 0     Sig: TAKE 1 CAPSULE BY MOUTH EVERY DAY        Verified rx. Last OV 6/25/24. Erx to pharm on file.

## 2025-06-23 ENCOUNTER — RESULTS FOLLOW-UP (OUTPATIENT)
Dept: GASTROENTEROLOGY | Age: 85
End: 2025-06-23

## 2025-06-23 ENCOUNTER — OFFICE VISIT (OUTPATIENT)
Dept: INTERNAL MEDICINE CLINIC | Facility: CLINIC | Age: 85
End: 2025-06-23
Payer: MEDICARE

## 2025-06-23 VITALS
TEMPERATURE: 98.6 F | DIASTOLIC BLOOD PRESSURE: 54 MMHG | RESPIRATION RATE: 16 BRPM | OXYGEN SATURATION: 96 % | SYSTOLIC BLOOD PRESSURE: 101 MMHG | HEART RATE: 112 BPM

## 2025-06-23 DIAGNOSIS — R11.0 NAUSEA: Primary | ICD-10-CM

## 2025-06-23 DIAGNOSIS — I95.1 ORTHOSTATIC HYPOTENSION: ICD-10-CM

## 2025-06-23 DIAGNOSIS — G20.A1 PARKINSON'S DISEASE WITHOUT DYSKINESIA OR FLUCTUATING MANIFESTATIONS (HCC): ICD-10-CM

## 2025-06-23 DIAGNOSIS — R11.0 NAUSEA: ICD-10-CM

## 2025-06-23 LAB
ANION GAP SERPL CALC-SCNC: 12 MMOL/L (ref 7–16)
BASOPHILS # BLD: 0.05 K/UL (ref 0–0.2)
BASOPHILS NFR BLD: 0.8 % (ref 0–2)
BUN SERPL-MCNC: 36 MG/DL (ref 8–23)
CALCIUM SERPL-MCNC: 9.1 MG/DL (ref 8.8–10.2)
CHLORIDE SERPL-SCNC: 105 MMOL/L (ref 98–107)
CO2 SERPL-SCNC: 22 MMOL/L (ref 20–29)
CREAT SERPL-MCNC: 1.34 MG/DL (ref 0.8–1.3)
DIFFERENTIAL METHOD BLD: ABNORMAL
EOSINOPHIL # BLD: 0.16 K/UL (ref 0–0.8)
EOSINOPHIL NFR BLD: 2.5 % (ref 0.5–7.8)
ERYTHROCYTE [DISTWIDTH] IN BLOOD BY AUTOMATED COUNT: 15.1 % (ref 11.9–14.6)
GLUCOSE SERPL-MCNC: 129 MG/DL (ref 70–99)
HCT VFR BLD AUTO: 36.4 % (ref 41.1–50.3)
HGB BLD-MCNC: 11.6 G/DL (ref 13.6–17.2)
IMM GRANULOCYTES # BLD AUTO: 0.02 K/UL (ref 0–0.5)
IMM GRANULOCYTES NFR BLD AUTO: 0.3 % (ref 0–5)
LYMPHOCYTES # BLD: 0.79 K/UL (ref 0.5–4.6)
LYMPHOCYTES NFR BLD: 12.2 % (ref 13–44)
MAGNESIUM SERPL-MCNC: 2 MG/DL (ref 1.8–2.4)
MCH RBC QN AUTO: 30.3 PG (ref 26.1–32.9)
MCHC RBC AUTO-ENTMCNC: 31.9 G/DL (ref 31.4–35)
MCV RBC AUTO: 95 FL (ref 82–102)
MONOCYTES # BLD: 0.54 K/UL (ref 0.1–1.3)
MONOCYTES NFR BLD: 8.3 % (ref 4–12)
NEUTS SEG # BLD: 4.94 K/UL (ref 1.7–8.2)
NEUTS SEG NFR BLD: 75.9 % (ref 43–78)
NRBC # BLD: 0 K/UL (ref 0–0.2)
PLATELET # BLD AUTO: 153 K/UL (ref 150–450)
PMV BLD AUTO: 12.2 FL (ref 9.4–12.3)
POTASSIUM SERPL-SCNC: 4.4 MMOL/L (ref 3.5–5.1)
RBC # BLD AUTO: 3.83 M/UL (ref 4.23–5.6)
SODIUM SERPL-SCNC: 140 MMOL/L (ref 136–145)
WBC # BLD AUTO: 6.5 K/UL (ref 4.3–11.1)

## 2025-06-23 PROCEDURE — 1160F RVW MEDS BY RX/DR IN RCRD: CPT | Performed by: NURSE PRACTITIONER

## 2025-06-23 PROCEDURE — 99214 OFFICE O/P EST MOD 30 MIN: CPT | Performed by: NURSE PRACTITIONER

## 2025-06-23 PROCEDURE — G8420 CALC BMI NORM PARAMETERS: HCPCS | Performed by: NURSE PRACTITIONER

## 2025-06-23 PROCEDURE — 1159F MED LIST DOCD IN RCRD: CPT | Performed by: NURSE PRACTITIONER

## 2025-06-23 PROCEDURE — 93000 ELECTROCARDIOGRAM COMPLETE: CPT | Performed by: NURSE PRACTITIONER

## 2025-06-23 PROCEDURE — G8427 DOCREV CUR MEDS BY ELIG CLIN: HCPCS | Performed by: NURSE PRACTITIONER

## 2025-06-23 PROCEDURE — G2211 COMPLEX E/M VISIT ADD ON: HCPCS | Performed by: NURSE PRACTITIONER

## 2025-06-23 PROCEDURE — 1036F TOBACCO NON-USER: CPT | Performed by: NURSE PRACTITIONER

## 2025-06-23 PROCEDURE — 1123F ACP DISCUSS/DSCN MKR DOCD: CPT | Performed by: NURSE PRACTITIONER

## 2025-06-23 ASSESSMENT — ENCOUNTER SYMPTOMS
NAUSEA: 1
ABDOMINAL PAIN: 0
VOMITING: 0
DIARRHEA: 0
CONSTIPATION: 0
SHORTNESS OF BREATH: 0

## 2025-06-23 NOTE — PROGRESS NOTES
6/23/2025 1:39 PM  Location:[facility] PHYSICIAN SERVICES  [facility] [place] MEDICINE  [place]  Patient #:  [id number]  YOB: 1940    Reason for Visit  1 Month Follow-Up Patient presents in the office today for a 1 month follow up on hypotension, dizziness      History of Present Illness  The patient is an 84-year-old male who presents for a 4-week follow-up. He was recently seen for orthostatic hypotension and chronic nausea. He has a history of Parkinson's disease, coronary artery disease with coronary artery bypass grafting, prostate cancer, atrial fibrillation on aspirin, obstructive sleep apnea, constipation, Alzheimer's disease, and chronic kidney disease. At his last appointment, midodrine 2.5 mg 3 times a day was added to his regimen. In the interim, he underwent an esophagogastroduodenoscopy which showed a hiatal hernia and gastritis, and he was placed on omeprazole 40 mg twice daily as well as Pepcid.    He reports no improvement in his symptoms despite adherence to the prescribed medication regimen. He does not experience dizziness but continues to suffer from nausea upon standing, a symptom that has persisted for several years. He has been compliant with the midodrine 3 times a day dosage. He has been taking omeprazole 40 mg twice daily for 8 weeks, followed by a reduction to once daily, and Pepcid at bedtime. He has also been consuming guicho tea twice daily and guicho gummies for nausea, as recommended by his gastroenterologist. However, these interventions have not resulted in any noticeable improvement. He reports adequate hydration, normal urine color, and regular bowel movements without diarrhea. He does not experience vertigo or require hearing aids. His nausea subsides when he lies down without a pillow.    He has a history of atrial fibrillation post-bypass surgery but has not experienced any episodes since then. He has an upcoming appointment with Dr. Perry in 08/2025. He is

## 2025-06-24 ENCOUNTER — RESULTS FOLLOW-UP (OUTPATIENT)
Dept: INTERNAL MEDICINE CLINIC | Facility: CLINIC | Age: 85
End: 2025-06-24

## 2025-07-01 ENCOUNTER — TELEPHONE (OUTPATIENT)
Dept: GASTROENTEROLOGY | Age: 85
End: 2025-07-01

## 2025-07-01 NOTE — TELEPHONE ENCOUNTER
Called to check on patient since procedure/ medication changes. Patient's daughter verbalized that patient continues to have nausea upon standing. He does not report any change since starting the omeprazole BID/ pepcid QHS. Scheduled vv with Dr Louise for 7/17 to discuss symptoms/ treatment plan.

## 2025-07-24 ENCOUNTER — TELEMEDICINE (OUTPATIENT)
Dept: GASTROENTEROLOGY | Age: 85
End: 2025-07-24
Payer: MEDICARE

## 2025-07-24 DIAGNOSIS — R11.0 NAUSEA: Primary | ICD-10-CM

## 2025-07-24 PROCEDURE — 99214 OFFICE O/P EST MOD 30 MIN: CPT | Performed by: STUDENT IN AN ORGANIZED HEALTH CARE EDUCATION/TRAINING PROGRAM

## 2025-07-24 NOTE — PROGRESS NOTES
cyanocobalamin 1,000 mcg, IntraMUSCular, EVERY 30 DAYS    dilTIAZem (CARDIZEM CD) 120 MG extended release capsule Oral, DAILY    donepezil (ARICEPT) 10 mg, Oral, NIGHTLY    famotidine (PEPCID) 40 mg, Oral, EVERY BEDTIME    memantine (NAMENDA) 10 mg, Oral, 2 TIMES DAILY    midodrine (PROAMATINE) 2.5 mg, Oral, 3 TIMES DAILY    Multiple Vitamin (MULTIVITAMIN ADULT PO) 1 tablet, DAILY    nitroGLYCERIN (NITROSTAT) 0.4 mg, SubLINGual, EVERY 5 MIN PRN, up to max of 3 total doses. If no relief after 1 dose, call 911.    nystatin (MYCOSTATIN) 316254 UNIT/GM ointment APPLY TO AFFECTED AREA TWICE A DAY    omeprazole (PRILOSEC) 40 MG delayed release capsule Take 1 capsule by mouth 2 times daily (before meals) for 60 days, THEN 1 capsule every morning (before breakfast).    ondansetron (ZOFRAN-ODT) 8 mg, Oral, EVERY 8 HOURS PRN     Review of Systems    ROS:    A complete 11 system ROS was performed and was negative aside from the pertinent negative and positives noted above.     PE:   There were no vitals filed for this visit.   General:  The patient appears well-nourished, and is in no acute distress.    Skin:  no rash, ulcers. No Bleeding or signs of infection.  HEENT:  Normocephalic, atraumatic. No sclerae icterus.   Neck:  No pain on palpation or mobilization of the neck.  Respiratory: Respiratory effort is normal. Expansion maintained bilaterally and symmetrically. Normal breath sounds and clear to auscultation bilaterally without wheezes, rales, or rhonchi.    Cardiovascular:  Regular rate and rhythm.     Abdomen:  Soft, non tender to palpation. No distention. Normoactive bowel sounds present.    Extremities: No edema bilaterally. No erythema  Neurologic:  Alert and oriented x3.  No sensory deficits. No asterixis  Psychiatric: Appropriate mood and affect.  Musculoskeletal: Strength and tone are symmetrical and maintained.    Assessment & Plan  1. Nausea:  - Nausea likely due to orthostatic hypotension, occurring upon

## 2025-07-27 DIAGNOSIS — G20.A1 PARKINSON'S DISEASE WITHOUT DYSKINESIA OR FLUCTUATING MANIFESTATIONS (HCC): ICD-10-CM

## 2025-07-27 DIAGNOSIS — G20.A1 PARKINSON'S DISEASE WITHOUT FLUCTUATING MANIFESTATIONS, UNSPECIFIED WHETHER DYSKINESIA PRESENT (HCC): ICD-10-CM

## 2025-07-28 RX ORDER — CARBIDOPA AND LEVODOPA 50; 200 MG/1; MG/1
TABLET, EXTENDED RELEASE ORAL
Qty: 180 TABLET | Refills: 1 | Status: SHIPPED | OUTPATIENT
Start: 2025-07-28

## 2025-07-31 ENCOUNTER — TELEPHONE (OUTPATIENT)
Dept: INTERNAL MEDICINE CLINIC | Facility: CLINIC | Age: 85
End: 2025-07-31

## 2025-07-31 NOTE — TELEPHONE ENCOUNTER
In addition to frequency and urgency, he has been having burning. This has been happening 3-4 days.

## 2025-07-31 NOTE — TELEPHONE ENCOUNTER
UTI SYMPTOMS    BURNING?     FEVER?   If yes, document temperature:     CHILLS?     NAUSEA?   VOMITING?     BLOOD IN URINE?     BACK PAIN?     HOW LONG HAVE YOU HAD THE ABOVE SYMPTOMS?  Symptoms started yesterday    ALLERGIES:     PHARMACY:   [facility]/pharmacy #[id number] - [place] -  [phone] -  [phone]       : 1940    Pt started yesterday with frequency and urgency      Call Back# [phone]    Pt would like to bring a specimen if possible

## 2025-08-01 ENCOUNTER — TELEPHONE (OUTPATIENT)
Dept: INTERNAL MEDICINE CLINIC | Facility: CLINIC | Age: 85
End: 2025-08-01

## 2025-08-01 ENCOUNTER — OFFICE VISIT (OUTPATIENT)
Dept: INTERNAL MEDICINE CLINIC | Facility: CLINIC | Age: 85
End: 2025-08-01

## 2025-08-01 VITALS
BODY MASS INDEX: 22.38 KG/M2 | HEIGHT: 75 IN | HEART RATE: 80 BPM | DIASTOLIC BLOOD PRESSURE: 52 MMHG | SYSTOLIC BLOOD PRESSURE: 102 MMHG | RESPIRATION RATE: 16 BRPM | WEIGHT: 180 LBS | TEMPERATURE: 97.8 F

## 2025-08-01 DIAGNOSIS — G47.33 OBSTRUCTIVE SLEEP APNEA SYNDROME: ICD-10-CM

## 2025-08-01 DIAGNOSIS — I25.10 CORONARY ARTERY DISEASE INVOLVING NATIVE CORONARY ARTERY OF NATIVE HEART WITHOUT ANGINA PECTORIS: ICD-10-CM

## 2025-08-01 DIAGNOSIS — G20.A1 PARKINSON'S DISEASE WITHOUT DYSKINESIA OR FLUCTUATING MANIFESTATIONS (HCC): ICD-10-CM

## 2025-08-01 DIAGNOSIS — C61 PROSTATE CANCER (HCC): ICD-10-CM

## 2025-08-01 DIAGNOSIS — K59.01 SLOW TRANSIT CONSTIPATION: ICD-10-CM

## 2025-08-01 DIAGNOSIS — R30.0 DYSURIA: ICD-10-CM

## 2025-08-01 DIAGNOSIS — I48.0 PAROXYSMAL ATRIAL FIBRILLATION (HCC): Primary | ICD-10-CM

## 2025-08-01 PROBLEM — R11.0 NAUSEA: Status: RESOLVED | Noted: 2025-06-13 | Resolved: 2025-08-01

## 2025-08-01 LAB
APPEARANCE UR: ABNORMAL
BACTERIA URNS QL MICRO: ABNORMAL /HPF
BILIRUB UR QL: NEGATIVE
COLOR UR: ABNORMAL
EPI CELLS #/AREA URNS HPF: ABNORMAL /HPF (ref 0–5)
GLUCOSE UR STRIP.AUTO-MCNC: NEGATIVE MG/DL
HGB UR QL STRIP: ABNORMAL
HYALINE CASTS URNS QL MICRO: ABNORMAL /LPF
KETONES UR QL STRIP.AUTO: ABNORMAL MG/DL
LEUKOCYTE ESTERASE UR QL STRIP.AUTO: ABNORMAL
NITRITE UR QL STRIP.AUTO: NEGATIVE
PH UR STRIP: 5 (ref 5–9)
PROT UR STRIP-MCNC: 30 MG/DL
RBC #/AREA URNS HPF: ABNORMAL /HPF (ref 0–5)
SP GR UR REFRACTOMETRY: 1.02 (ref 1–1.02)
UROBILINOGEN UR QL STRIP.AUTO: 1 EU/DL (ref 0.2–1)
WBC URNS QL MICRO: >100 /HPF (ref 0–4)

## 2025-08-02 DIAGNOSIS — N39.0 URINARY TRACT INFECTION WITHOUT HEMATURIA, SITE UNSPECIFIED: Primary | ICD-10-CM

## 2025-08-02 RX ORDER — CEPHALEXIN 500 MG/1
500 CAPSULE ORAL 3 TIMES DAILY
Qty: 30 CAPSULE | Refills: 0 | Status: SHIPPED | OUTPATIENT
Start: 2025-08-02 | End: 2025-08-12

## 2025-08-03 LAB
BACTERIA SPEC CULT: ABNORMAL
BACTERIA SPEC CULT: ABNORMAL
SERVICE CMNT-IMP: ABNORMAL

## 2025-08-10 DIAGNOSIS — R11.2 NAUSEA AND VOMITING, UNSPECIFIED VOMITING TYPE: ICD-10-CM

## 2025-08-11 DIAGNOSIS — G20.A1 PARKINSON'S DISEASE (HCC): ICD-10-CM

## 2025-08-11 RX ORDER — CARBIDOPA AND LEVODOPA 25; 100 MG/1; MG/1
2 TABLET ORAL 3 TIMES DAILY
Qty: 540 TABLET | Refills: 3 | Status: SHIPPED | OUTPATIENT
Start: 2025-08-11

## 2025-08-12 ENCOUNTER — OFFICE VISIT (OUTPATIENT)
Age: 85
End: 2025-08-12
Payer: MEDICARE

## 2025-08-12 ENCOUNTER — TELEPHONE (OUTPATIENT)
Dept: GASTROENTEROLOGY | Age: 85
End: 2025-08-12

## 2025-08-12 VITALS
WEIGHT: 200 LBS | BODY MASS INDEX: 24.36 KG/M2 | HEIGHT: 76 IN | DIASTOLIC BLOOD PRESSURE: 74 MMHG | HEART RATE: 80 BPM | SYSTOLIC BLOOD PRESSURE: 110 MMHG

## 2025-08-12 DIAGNOSIS — I25.10 CORONARY ARTERY DISEASE INVOLVING NATIVE CORONARY ARTERY OF NATIVE HEART WITHOUT ANGINA PECTORIS: Primary | ICD-10-CM

## 2025-08-12 DIAGNOSIS — I48.0 PAROXYSMAL ATRIAL FIBRILLATION (HCC): ICD-10-CM

## 2025-08-12 DIAGNOSIS — N18.31 STAGE 3A CHRONIC KIDNEY DISEASE (HCC): ICD-10-CM

## 2025-08-12 DIAGNOSIS — G47.33 OBSTRUCTIVE SLEEP APNEA SYNDROME: ICD-10-CM

## 2025-08-12 PROCEDURE — 1126F AMNT PAIN NOTED NONE PRSNT: CPT | Performed by: INTERNAL MEDICINE

## 2025-08-12 PROCEDURE — 1159F MED LIST DOCD IN RCRD: CPT | Performed by: INTERNAL MEDICINE

## 2025-08-12 PROCEDURE — 1123F ACP DISCUSS/DSCN MKR DOCD: CPT | Performed by: INTERNAL MEDICINE

## 2025-08-12 PROCEDURE — 1036F TOBACCO NON-USER: CPT | Performed by: INTERNAL MEDICINE

## 2025-08-12 PROCEDURE — 99214 OFFICE O/P EST MOD 30 MIN: CPT | Performed by: INTERNAL MEDICINE

## 2025-08-12 PROCEDURE — G8420 CALC BMI NORM PARAMETERS: HCPCS | Performed by: INTERNAL MEDICINE

## 2025-08-12 PROCEDURE — G8427 DOCREV CUR MEDS BY ELIG CLIN: HCPCS | Performed by: INTERNAL MEDICINE

## 2025-08-12 RX ORDER — NITROGLYCERIN 0.4 MG/1
0.4 TABLET SUBLINGUAL EVERY 5 MIN PRN
Qty: 25 TABLET | Refills: 5 | Status: SHIPPED | OUTPATIENT
Start: 2025-08-12

## 2025-08-12 RX ORDER — DILTIAZEM HYDROCHLORIDE 120 MG/1
120 CAPSULE, COATED, EXTENDED RELEASE ORAL DAILY
Qty: 90 CAPSULE | Refills: 3 | Status: SHIPPED | OUTPATIENT
Start: 2025-08-12

## 2025-08-12 RX ORDER — ONDANSETRON 8 MG/1
8 TABLET, ORALLY DISINTEGRATING ORAL EVERY 8 HOURS PRN
Qty: 90 TABLET | Refills: 1 | OUTPATIENT
Start: 2025-08-12

## 2025-08-24 DIAGNOSIS — I95.1 ORTHOSTATIC HYPOTENSION: ICD-10-CM

## 2025-08-25 RX ORDER — MIDODRINE HYDROCHLORIDE 2.5 MG/1
2.5 TABLET ORAL 3 TIMES DAILY
Qty: 270 TABLET | Refills: 1 | Status: SHIPPED | OUTPATIENT
Start: 2025-08-25

## 2025-09-04 ENCOUNTER — TELEPHONE (OUTPATIENT)
Dept: NEUROLOGY | Age: 85
End: 2025-09-04

## (undated) DEVICE — SUTURE ETHBND EXCEL 2-0 L30IN NONABSORBABLE GRN L26MM SH MX563

## (undated) DEVICE — SOLUTION IRRIG 1000ML H2O PIC PLAS SHATTERPROOF CONTAINER

## (undated) DEVICE — MEDI-TRACE CADENCE ADULT, DEFIBRILLATION ELECTRODE -RTS  (10 PR/PK) - ZOLL: Brand: MEDI-TRACE CADENCE

## (undated) DEVICE — SUTURE TEMP PACE WIRE SZ 2-0 L24IN NONABSORBABLE LIGHT/DARK

## (undated) DEVICE — AIRLIFE™ OXYGEN TUBING 7 FEET (2.1 M) CRUSH RESISTANT OXYGEN TUBING, VINYL TIPPED: Brand: AIRLIFE™

## (undated) DEVICE — CANNULA INJ L2.5IN BLNT TIP 3MM CLR BODY W/ 1 W VLV DLP

## (undated) DEVICE — SUTURE VCRL SZ 4-0 L27IN ABSRB UD L19MM PS-2 3/8 CIR PRIM J426H

## (undated) DEVICE — BLADE SCALP SURG BARD-PARK 10 --

## (undated) DEVICE — SOLUTION IV 1000ML 0.9% SOD CHL

## (undated) DEVICE — SOLUTION IRRIG 3000ML 0.9% SOD CHL FLX CONT 0797208] ICU MEDICAL INC]

## (undated) DEVICE — CANNULA PERF L1.8MM TIP L1MM S STL SHFT BLB SHP TIP FEM

## (undated) DEVICE — GLOVE SURG SZ 7 L11.2IN THK9.8MIL STRW LTX POLYMER BEAD CUF

## (undated) DEVICE — BLOCK BITE AD 60FR W/ VELC STRP ADDRESSES MOST PT AND

## (undated) DEVICE — ENDOSCOPIC KIT 1.1+ OP4 CA DE 2 GWN AAMI LEVEL 3

## (undated) DEVICE — KENDALL RADIOLUCENT FOAM MONITORING ELECTRODE RECTANGULAR SHAPE: Brand: KENDALL

## (undated) DEVICE — BLADE SAW W10XL54MM FOR PRI REPEAT STRNOTMY

## (undated) DEVICE — APPLIER CLP LIG SM TI PREM SURGCLP SUPER INTLOK 20 DISP

## (undated) DEVICE — AMD ANTIMICROBIAL GAUZE SPONGES,12 PLY USP TYPE VII, 0.2% POLYHEXAMETHYLENE BIGUANIDE HCI (PHMB): Brand: CURITY

## (undated) DEVICE — SUTURE NONABSORBABLE L24IN SZ 7-0 M0-5 BV175-8 EP 24 BLU M8745

## (undated) DEVICE — SUTURE PROL SZ 6-0 L30IN NONABSORBABLE BLU L13MM CC-1 3/8 M8707

## (undated) DEVICE — SPONGE LAP 18X18IN STRL -- 5/PK

## (undated) DEVICE — DRAPE SLUSH DISC W44XL66IN ST FOR RND BSIN HUSH SLUSH SYS

## (undated) DEVICE — (D)PREP SKN CHLRAPRP APPL 26ML -- CONVERT TO ITEM 371833

## (undated) DEVICE — SURGIFOAM SPNG SZ 100

## (undated) DEVICE — GOWN,SIRUS,POLYRNF,BRTHSLV,XL,30/CS: Brand: MEDLINE

## (undated) DEVICE — SINGLE PORT MANIFOLD: Brand: NEPTUNE 2

## (undated) DEVICE — DUAL LUMEN STOMACH TUBE: Brand: SALEM SUMP

## (undated) DEVICE — BUTTON SWITCH PENCIL BLADE ELECTRODE, HOLSTER: Brand: EDGE

## (undated) DEVICE — CATHETER ETER TY TEMP SENS F MBO W URIN M FOLLOWS CDC GUIDELINES TO

## (undated) DEVICE — SUT PROL 4-0 30IN SH1 DA BLU --

## (undated) DEVICE — SS SUTURE, 3 PER SLEEVE: Brand: MYO/WIRE II

## (undated) DEVICE — SUTURE PDS II SZ 1 L36IN ABSRB VLT L48MM CTX 1/2 CIR Z371T

## (undated) DEVICE — 3000CC GUARDIAN II: Brand: GUARDIAN

## (undated) DEVICE — AMD ANTIMICROBIAL NON-ADHERENT PAD,0.2% POLYHEXAMETHYLENE BIGUANIDE HCI (PHMB): Brand: TELFA

## (undated) DEVICE — CLAMP INSERT: Brand: STEALTH® CLAMP INSERT

## (undated) DEVICE — 3M™ STERI-STRIP™ REINFORCED ADHESIVE SKIN CLOSURES, R1547, 1/2 IN X 4 IN (12 MM X 100 MM), 6 STRIPS/ENVELOPE: Brand: 3M™ STERI-STRIP™

## (undated) DEVICE — CABG DR WILLIAMS: Brand: MEDLINE INDUSTRIES, INC.

## (undated) DEVICE — CONNECTOR TBNG OD5-7MM O2 END DISP

## (undated) DEVICE — Device: Brand: JELCO

## (undated) DEVICE — BLADE SURG NO15 S STL STR DISP GLASSVAN

## (undated) DEVICE — CATHETER THOR 32FR L23IN PVC 6 EYELET STR ATRAUM

## (undated) DEVICE — Device

## (undated) DEVICE — BLADE SURG NO20 S STL STR DISP GLASSVAN

## (undated) DEVICE — SUTURE PERMAHAND SZ 2-0 L12X18IN NONABSORBABLE BLK SILK A185H

## (undated) DEVICE — CATH URETH INTMIT ROB 14FR FUN -- USE ITEM 179521

## (undated) DEVICE — AMD ANTIMICROBIAL BANDAGE ROLL,6 PLY: Brand: KERLIX

## (undated) DEVICE — GAUZE,SPONGE,4"X4",12PLY,WOVEN,NS,LF: Brand: MEDLINE

## (undated) DEVICE — STERILE HOOK LOCK LATEX FREE ELASTIC BANDAGE 4INX5YD: Brand: HOOK LOCK™

## (undated) DEVICE — AORTIC PUNCHES ARE USED TO CREATE A UNIFORM OPENING IN BLOOD VESSELS DURING CARDIOVASCULAR SURGERY. THE VESSEL GRAFT IS INSERTED INTO THE CREATED OPENING AND SUTURED TO THE VESSEL WALL. AORTIC LANCETS ARE USED TO MAKE A SMALL UNIFORM CUT IN A BLOOD VESSEL TO FACILITATE INSERTION OF AN AORTIC PUNCH.  PUNCHES COME IN VARIOUS LENGTHS, DIAMETERS AND TIP CONFIGURATIONS.: Brand: CLEANCUT ROTATING AORTIC PUNCH

## (undated) DEVICE — (D)STRIP SKN CLSR 0.5X4IN WHT --

## (undated) DEVICE — 3M™ IOBAN™ 2 ANTIMICROBIAL INCISE DRAPE 6648EZ: Brand: IOBAN™ 2

## (undated) DEVICE — CONTAINER FORMALIN PREFILLED 10% NBF 60ML

## (undated) DEVICE — 6 FOOT DISPOSABLE EXTENSION CABLE WITH SAFE CONNECT / SCREW-DOWN

## (undated) DEVICE — SUTURE VCRL SZ 3-0 L36IN ABSRB UD L36MM CT-1 1/2 CIR J944H

## (undated) DEVICE — CATHETER THOR 24FR L22IN PVC 5 EYELET STR ATRAUM

## (undated) DEVICE — BASIC SINGLE BASIN-LF: Brand: MEDLINE INDUSTRIES, INC.

## (undated) DEVICE — FORCEPS BX L240CM JAW DIA2.8MM L CAP W/ NDL MIC MESH TOOTH

## (undated) DEVICE — BLADE OPHTH MINI BEAV SHRP --

## (undated) DEVICE — STERILE HOOK LOCK LATEX FREE ELASTIC BANDAGE 6INX5YD: Brand: HOOK LOCK™

## (undated) DEVICE — SUTURE S STL SZ 6 L18IN NONABSORBABLE SIL L48MM CCS 1/2 CIR M654G

## (undated) DEVICE — DISPOSABLE BIOPSY VALVE MAJ-1555: Brand: SINGLE USE BIOPSY VALVE (STERILE)

## (undated) DEVICE — APPLIER CLP L9.38IN M LIG TI DISP STR RNG HNDL LIGACLP

## (undated) DEVICE — SUT PROL 3-0 36IN SH DA BLU --

## (undated) DEVICE — SUTURE PROL DBL ARMED 8 0 BV130 5 24IN N ABSRB MFIL BLU M8739

## (undated) DEVICE — SUTURE ETHBND EXCEL SZ 0 L18IN NONABSORBABLE GRN L36MM CT-1 CX21D

## (undated) DEVICE — 48" PROBE COVER W/GEL, ULTRASOUND, STERILE: Brand: SITE-RITE

## (undated) DEVICE — CONNECTOR IV 3/8X3/8X3/8 Y

## (undated) DEVICE — SUTURE SILK PERMAHAND PRECUT 6 X 30 IN SZ 1 BLK BRAID A307H

## (undated) DEVICE — REM POLYHESIVE ADULT PATIENT RETURN ELECTRODE: Brand: VALLEYLAB

## (undated) DEVICE — BLADE SCALP SURG BARD-PARK 11 --

## (undated) DEVICE — 1/4 FORCE SURGICAL SPRING CLIP: Brand: STEALTH® SPRING CLIP

## (undated) DEVICE — PLEDGET VASC W3/16XL3/8IN THK1/16IN PTFE SFT

## (undated) DEVICE — MOUTHPIECE ENDOSCP L CTRL OPN AND SIDE PORTS DISP

## (undated) DEVICE — CLIP INT SM TI EZ LD LIG SYS WECK HORZ